# Patient Record
Sex: MALE | Race: WHITE | NOT HISPANIC OR LATINO | Employment: FULL TIME | URBAN - METROPOLITAN AREA
[De-identification: names, ages, dates, MRNs, and addresses within clinical notes are randomized per-mention and may not be internally consistent; named-entity substitution may affect disease eponyms.]

---

## 2017-01-10 ENCOUNTER — HOSPITAL ENCOUNTER (OUTPATIENT)
Dept: RADIOLOGY | Facility: MEDICAL CENTER | Age: 37
Discharge: HOME | End: 2017-01-10
Payer: COMMERCIAL

## 2017-01-10 ENCOUNTER — ALLSCRIPTS OFFICE VISIT (OUTPATIENT)
Dept: OTHER | Facility: OTHER | Age: 37
End: 2017-01-10

## 2017-01-10 ENCOUNTER — TRANSCRIBE ORDERS (OUTPATIENT)
Dept: ADMINISTRATIVE | Facility: HOSPITAL | Age: 37
End: 2017-01-10

## 2017-01-10 DIAGNOSIS — R29.898 OTHER SYMPTOMS AND SIGNS INVOLVING THE MUSCULOSKELETAL SYSTEM: ICD-10-CM

## 2017-01-10 DIAGNOSIS — R79.89 OTHER SPECIFIED ABNORMAL FINDINGS OF BLOOD CHEMISTRY: ICD-10-CM

## 2017-01-10 DIAGNOSIS — M51.16 INTERVERTEBRAL DISC DISORDER WITH RADICULOPATHY OF LUMBAR REGION: ICD-10-CM

## 2017-01-10 DIAGNOSIS — R29.898 OTHER SYMPTOMS REFERABLE TO UPPER ARM JOINT: ICD-10-CM

## 2017-01-10 DIAGNOSIS — M54.9 DORSALGIA: ICD-10-CM

## 2017-01-10 DIAGNOSIS — M54.9 BACKACHE, UNSPECIFIED: Primary | ICD-10-CM

## 2017-01-10 PROCEDURE — 72110 X-RAY EXAM L-2 SPINE 4/>VWS: CPT

## 2017-01-16 ENCOUNTER — HOSPITAL ENCOUNTER (OUTPATIENT)
Dept: MRI IMAGING | Facility: HOSPITAL | Age: 37
Discharge: HOME | End: 2017-01-16
Payer: COMMERCIAL

## 2017-01-16 DIAGNOSIS — R29.898 OTHER SYMPTOMS AND SIGNS INVOLVING THE MUSCULOSKELETAL SYSTEM: ICD-10-CM

## 2017-01-16 DIAGNOSIS — M54.9 DORSALGIA: ICD-10-CM

## 2017-01-16 PROCEDURE — 72148 MRI LUMBAR SPINE W/O DYE: CPT

## 2017-01-20 ENCOUNTER — HOSPITAL ENCOUNTER (OUTPATIENT)
Dept: RADIOLOGY | Age: 37
Discharge: HOME | End: 2017-01-20
Payer: COMMERCIAL

## 2017-01-20 DIAGNOSIS — R79.89 OTHER SPECIFIED ABNORMAL FINDINGS OF BLOOD CHEMISTRY: ICD-10-CM

## 2017-01-20 PROCEDURE — 76705 ECHO EXAM OF ABDOMEN: CPT

## 2017-01-27 ENCOUNTER — GENERIC CONVERSION - ENCOUNTER (OUTPATIENT)
Dept: OTHER | Facility: OTHER | Age: 37
End: 2017-01-27

## 2017-02-06 ENCOUNTER — ALLSCRIPTS OFFICE VISIT (OUTPATIENT)
Dept: OTHER | Facility: OTHER | Age: 37
End: 2017-02-06

## 2017-02-21 ENCOUNTER — GENERIC CONVERSION - ENCOUNTER (OUTPATIENT)
Dept: OTHER | Facility: OTHER | Age: 37
End: 2017-02-21

## 2017-06-30 DIAGNOSIS — M54.9 DORSALGIA: ICD-10-CM

## 2017-06-30 DIAGNOSIS — M51.16 INTERVERTEBRAL DISC DISORDER WITH RADICULOPATHY OF LUMBAR REGION: ICD-10-CM

## 2018-01-10 NOTE — MISCELLANEOUS
Signatures   Electronically signed by :  Jazz Talavera, ; Feb 21 2017  8:17AM EST                       (Author)

## 2018-01-12 NOTE — MISCELLANEOUS
Message  left message that he has mild fatty liver dx  awaiting hept studies   rec rto 1 month      Signatures   Electronically signed by : Kati Farah DO; Jan 27 2017  9:59AM EST                       (Author)

## 2018-01-13 VITALS
DIASTOLIC BLOOD PRESSURE: 100 MMHG | WEIGHT: 274.2 LBS | BODY MASS INDEX: 33.39 KG/M2 | HEIGHT: 76 IN | SYSTOLIC BLOOD PRESSURE: 150 MMHG | TEMPERATURE: 97.5 F

## 2018-01-17 NOTE — RESULT NOTES
Verified Results  * XR CHEST PA & LATERAL [id number] 01:26PM Rian Mckeon Order Number: [id number]     Test Name Result Flag Reference   XR CHEST PA & LATERAL (Report)     CHEST - DUAL ENERGY     INDICATION: Productive cough and chest pain  COMPARISON: None available     VIEWS: PA (including soft tissue/bone algorithms) and lateral projections; 5 images     FINDINGS: Lungs appear adequately aerated  No consolidation or effusion  Cardiac size normal  No vascular congestion or peribronchial thickening  Visualized osseous structures appear within normal limits for the patient's age  No pneumothorax or free air  IMPRESSION:   No active pulmonary disease  Workstation performed: [id number]     Signed by:    Ora Conde DO   11/25/16

## 2018-01-18 NOTE — CONSULTS
Assessment    1  Degenerative lumbar disc (722 52) (M51 36)   2  Lumbar disc herniation with radiculopathy (722 10) (M51 16)    Plan  Lumbar disc herniation with radiculopathy    · *1 - [facility] Physical Therapy  Consult: please assess for lumbar ROM,  core strengthening, hip girdle/LE strengthening  Status: Active  Requested for:  91FMY7787   Ordered; For: Lumbar disc herniation with radiculopathy; Ordered By: Hyacinth Sanderson Performed:  Due: 01FTL1613  Care Summary provided  : Yes   · 1 - Cal Valdez DO Pain Management Physician Referral  Consult Only: the expectation  is that the referring provider will communicate back to the patient on treatment options  Evaluation and Treatment: the expectation is that the referred to provider will  communicate back to the patient on treatment options  please assess for left L4/5 injection  Status: Hold For - Scheduling  Requested for:  69ZVJ5185   Ordered; For: Lumbar disc herniation with radiculopathy; Ordered By: Hyacinth Sanderson Performed:  Due: 65PBL3223; Last Updated By: Araceli Adames; 2/6/2017 9:21:20 AM  Care Summary provided  : Yes   · Follow-up visit in 3 months Evaluation and Treatment  Follow-up  Status: Hold For -  Scheduling  Requested for: 26ZCL2602   Ordered; For: Lumbar disc herniation with radiculopathy; Ordered By: Hyacinth Sanderson Performed:  Due: 89UND5448    Discussion/Summary    Mr Adriana Mora is being referred by Dr Giulia Addison  He presents with acute on chronic LBP and lumbar Radiculopathy  today we reviewed activity restrictions as well as the need for RoM exerises and PT  We discussed the role of conservative medical treatments such as PT, FIORDALIZA and medications  i have provided for him a script for PT, a referral to Dr Jasmine Kimball and will see him in fu in 3 months  Should he develop worsening symptoms of pain or weakness, or new bowel or bladder issues, he is to present to the nearest ED and contact my office ASAP     The patient has the current Goals: Reduce pain, improve function  The patent has the current Barriers: None  Patient is able to Self-Care  Self Referrals: No      Chief Complaint    1  Back Pain  Chronic low grade LBP x 6 yrs following fall, acute exacerbation with unremitting left buttock/medial thigh pain x 2 months after heavy lifting  Reduced pain with activity modification and nsaids  denies weakness, bowel or bladder dysfunction  No recent PT or FIORDALIZA  History of Present Illness    Lila Mistry presents with complaints of gradual onset of constant episodes of mild bilateral lower back pain, radiating to the left buttock and left thigh  On a scale of 1 to 10, the patient rates the pain as 4  The symptoms resulted from a lifting motion  Episodes started about 7 years ago  Symptoms are made worse by sitting, prolonged sitting, lifting and twisting  Symptoms are improving  Associated symptoms include spasm, but no stiffness, no fever, no night sweats, no abdominal pain, no general malaise, no weight loss, no arm numbness, no leg numbness, no arm weakness, no leg weakness, no urinary incontinence, no fecal incontinence, no urinary retention and no rash localized to the area of pain  Review of Systems    Constitutional: no fever and no chills  Eyes: no eyesight problems  ENT: no hearing loss  Cardiovascular: no chest pain and no palpitations  Respiratory: no shortness of breath, no cough and no wheezing  Gastrointestinal: no nausea  Genitourinary: no incontinence  Musculoskeletal: limb pain and joint stiffness, but as noted in HPI, no joint swelling, no myalgias and no limb swelling    The patient presents with complaints of gradual onset of mild lower back arthralgias  Neurological: No compliants of headache, no confusion, no convulsions, no numbness or tingling, no dizziness or fainting, no limb weakness, no difficulty walking  Psychiatric: anxiety, but no depression  Endocrine: no muscle weakness  Hematologic/Lymphatic: no tendency for easy bleeding and no tendency for easy bruising  ROS reviewed  Active Problems    1  Abnormal LFTs (790 6) (R79 89)   2  Abnormal MRI, lumbar spine (793 7) (R93 7)   3  Acute back pain (724 5) (M54 9)   4  Acute URI (465 9) (J06 9)   5  Hypertension, essential (401 9) (I10)   6  Overweight (278 02) (E66 3)   7  Vitamin D deficiency (268 9) (E55 9)   8  Weakness of both lower extremities (729 89) (R29 898)    Past Medical History    1  History of Abdominal discomfort (789 00) (R10 9)   2  History of Acute contact otitis externa of right ear (380 22) (H60 531)   3  History of Acute maxillary sinusitis (461 0) (J01 00)   4  History of Acute viral conjunctivitis of both eyes (077 99) (B30 9)   5  History of acute bronchitis (V12 69) (Z87 09)   6  History of acute bronchitis with bronchospasm (V12 69) (Z87 09)   7  History of acute sinusitis (V12 69) (Z87 09)   8  History of bronchitis (V12 69) (Z87 09)   9  History of Muscle ache (729 1) (M79 1)    The active problems and past medical history were reviewed and updated today  Surgical History    1  History of Tonsillectomy    The surgical history was reviewed and updated today  Family History  Mother    1  Family history of hypertension (V17 49) (Z82 49)   2  Family history of ulcerative colitis (V18 59) (Z83 79)  Father    3  Family history of hypertension (V17 49) (Z82 49)  Paternal Grandmother    3  Family history of cardiac disorder (V17 49) (Z82 49)   5  Family history of hypertension (V17 49) (Z82 49)   6  Family history of renal failure (V18 69) (Z84 1)  Maternal Grandfather    7  Family history of cardiac disorder (V17 49) (Z82 49)  Paternal Grandfather    8  Family history of heart failure (V17 49) (Z82 49)    The family history was reviewed and updated today         Social History    · Alcohol use   · Caffeine use (V49 89) (F15 90)   · Completed high school   · Current every day smoker (305 1) (F17 200)   · No drug use   · Occupation   · Single  The social history was reviewed and updated today  Current Meds   1  AmLODIPine Besylate 5 MG Oral Tablet; TAKE 1 TABLET DAILY FOR BLOOD   PRESSURE; Therapy: 78ZHJ2418 to (Evaluate:11Mar2017)  Requested for: 69IIV1856; Last   Rx:10Jan2017 Ordered   2  Multi-Day Vitamins TABS; Therapy: (Recorded:09Jun2014) to Recorded   3  Nabumetone 750 MG Oral Tablet; TAKE 1 TABLET TWICE DAILY AFTER MEALS; Therapy: 89YQD0591 to (Evaluate:11Mar2017)  Requested for: 73URG8475; Last   Rx:10Jan2017 Ordered   4  Vitamin D (Ergocalciferol) 97680 UNIT Oral Capsule; TAKE 1 CAPSULE WEEKLY; Therapy: 08FYH7250 to (Evaluate:13Apr2017)  Requested for: 96TSY9096; Last   Rx:13Jan2017 Ordered    The medication list was reviewed and updated today  Allergies    1  Ceclor    Vitals  Vital Signs    Recorded: 73WRJ1892 08:52AM   Temperature 98 1 F   Heart Rate 95   Respiration 16   Systolic 350   Diastolic 87   Height 6 ft 4 in   Weight 175 lb 2 oz   BMI Calculated 21 32   BSA Calculated 2 09   Pain Scale 4     Physical Exam   (postive SLR left side, grossly intact sensation / motor and DTR, mildly antalgic gait, paravertebral spasm)   Constitutional Patient appears healthy and well developed  No signs of acute distress present  Eyes Vision is grossly normal  Discs flat with sharp margins  Respiratory Auscultation of lungs: Clear to auscultation bilaterally  Cardiovascular Auscultation of heart: Rate is regular  Rhythm is regular  No heart murmur appreciated  Carotid pulses: 2+ and equal bilaterally, without bruits  Peripheral vascular exam: Pedal Pulses: 2+ and equal bilaterally  Radial pulses: 2+ and equal bilaterally  Extremities: No edema of the lower limbs bilaterally  Abdomen The abdomen is flat  No abdominal scars  Abdomen is soft, nontender, and nondistended  Anus, perineum, and rectum: Exam deferred  Neurologic - Mental Status: Alert and Oriented x3    Mood and affect: Affect is normal   Memory is grossly intact  Attention is WNL  Speech is articulated and fluent  Knowledge and vocabulary consistent with education  Cranial Nerve Exam:  2nd cranial nerve: Visual field was full to confrontation  3rd, 4th, and 6th cranial nerves: Normal with no deficit  5th cranial nerve: Sensation was intact in all three divisions to light touch and temperature  Motor function was intact  7th cranial nerve: Face symmetrical at grimace and at rest  8th cranial nerve: Grossly intact to finger rub bilaterally  9th and 10th cranial nerves: Uvula is midline  11th cranial nerve: Shoulder shrug equal bilaterally  12th cranial nerve: Tongue mideline, no atrophy present  Motor System General Motor Strength: No pronator drift and no parietal drift  Motor System - Upper Extremities: Normal to inspection and palpation  Strength: Deltoids 5/5 bilaterally  Biceps 5/5 bilaterally  Triceps 5/5 bilaterally  Extensor carpi radials is 5/5 bilaterally  Extensor digitorum 5/5 bilaterally  Intrinsic 5/5 bilaterally   5/5 bilaterally  Muscle tone: Normal bilaterally  Muscle Bulk: Normal bilaterally  Motor System - Lower Extremities: Normal to inspection and palpation  Strength: Iliopsoas 5/5 bilaterally  Quadriceps 5/5 bilaterally  Hamstrings 5/5 bilaterally  Gastrocnemius 5/5 bilaterally  Muscle tone: Normal bilaterally  Reflexes: Biceps reflexes are 2+ bilaterally  Triceps reflexes are 2+ bilaterally  Achilles reflexes are 2+ bilaterally  Babinski's reflex is 2+ down going bilaterally  Ankle clonus is absent bilaterally  Coordination: Finger to nose was normal    Sensory: Sensation grossly intact to light touch  Sensation grossly intact to light touch  Gait and Station: Griffith with a normal gait  Results/Data    I personally reviewed the MRI in detail with the patient      MRI Review massive left paracentral L4/5 disc herniation/extrusion with involvement of multiple nerve roots, moderate L5/S1 degeneration        Signatures   Electronically signed by : ZHAO Santiago ; Feb 6 2017  9:23AM EST                       (Author)

## 2018-01-22 VITALS
TEMPERATURE: 98.1 F | DIASTOLIC BLOOD PRESSURE: 87 MMHG | BODY MASS INDEX: 21.33 KG/M2 | SYSTOLIC BLOOD PRESSURE: 157 MMHG | RESPIRATION RATE: 16 BRPM | WEIGHT: 175.13 LBS | HEART RATE: 95 BPM | HEIGHT: 76 IN

## 2018-02-12 ENCOUNTER — OFFICE VISIT (OUTPATIENT)
Dept: FAMILY MEDICINE CLINIC | Facility: CLINIC | Age: 38
End: 2018-02-12
Payer: COMMERCIAL

## 2018-02-12 VITALS
BODY MASS INDEX: 34.58 KG/M2 | RESPIRATION RATE: 16 BRPM | TEMPERATURE: 98.6 F | HEIGHT: 76 IN | WEIGHT: 284 LBS | OXYGEN SATURATION: 98 % | DIASTOLIC BLOOD PRESSURE: 70 MMHG | SYSTOLIC BLOOD PRESSURE: 128 MMHG | HEART RATE: 80 BPM

## 2018-02-12 DIAGNOSIS — I10 ESSENTIAL HYPERTENSION: ICD-10-CM

## 2018-02-12 DIAGNOSIS — J40 BRONCHITIS: Primary | ICD-10-CM

## 2018-02-12 PROBLEM — K76.0 FATTY LIVER: Status: ACTIVE | Noted: 2018-02-12

## 2018-02-12 PROBLEM — R79.89 ABNORMAL LFTS: Status: ACTIVE | Noted: 2017-01-13

## 2018-02-12 PROBLEM — E55.9 VITAMIN D DEFICIENCY: Status: ACTIVE | Noted: 2017-01-13

## 2018-02-12 PROBLEM — M54.9 ACUTE BACK PAIN: Status: ACTIVE | Noted: 2017-01-10

## 2018-02-12 PROCEDURE — 99213 OFFICE O/P EST LOW 20 MIN: CPT | Performed by: INTERNAL MEDICINE

## 2018-02-12 RX ORDER — ERGOCALCIFEROL 1.25 MG/1
1 CAPSULE ORAL WEEKLY
COMMUNITY
Start: 2017-01-13 | End: 2019-01-02 | Stop reason: ALTCHOICE

## 2018-02-12 RX ORDER — AMLODIPINE BESYLATE 5 MG/1
TABLET ORAL
COMMUNITY
Start: 2017-01-10 | End: 2018-06-30 | Stop reason: SDUPTHER

## 2018-02-12 RX ORDER — MULTIVITAMIN
TABLET ORAL
COMMUNITY

## 2018-02-12 RX ORDER — LEVOFLOXACIN 500 MG/1
500 TABLET, FILM COATED ORAL EVERY 24 HOURS
Qty: 10 TABLET | Refills: 0 | Status: SHIPPED | OUTPATIENT
Start: 2018-02-12 | End: 2018-02-22

## 2018-02-12 RX ORDER — NABUMETONE 750 MG/1
1 TABLET, FILM COATED ORAL 2 TIMES DAILY
COMMUNITY
Start: 2017-01-10 | End: 2018-02-12

## 2018-02-12 NOTE — LETTER
February 12, 2018     Patient: Sahara Davis   YOB: 1980   Date of Visit: 2/12/2018       To Whom it May Concern:    Sahara Davis is under my professional care  He was seen in my office on 2/12/2018  He may return to work on 2/14/18  If you have any questions or concerns, please don't hesitate to call           Sincerely,          Navarrete Loges, DO        CC: No Recipients

## 2018-02-12 NOTE — PROGRESS NOTES
Assessment/Plan:         Diagnoses and all orders for this visit:    Bronchitis  -     levofloxacin (LEVAQUIN) 500 mg tablet; Take 1 tablet (500 mg total) by mouth every 24 hours for 10 days    Essential hypertension    Other orders  -     amLODIPine (NORVASC) 5 mg tablet; Take by mouth  -     Multiple Vitamin (MULTI-DAY VITAMINS) TABS; Take by mouth  -     Discontinue: nabumetone (RELAFEN) 750 mg tablet; Take 1 tablet by mouth Twice daily  -     ergocalciferol (VITAMIN D2) 50,000 units; Take 1 capsule by mouth once a week          Subjective:      Patient ID: Miki Crane is a 30 y o  male  Pt complains of being ill x 1 week  He went to pt [facility]   He was given z-malina  He feels no relief  +dry cough  +sore throat  Denies f/s/c        The following portions of the patient's history were reviewed and updated as appropriate: He  has no past medical history on file  He  does not have any pertinent problems on file  He  has a past surgical history that includes Tonsillectomy  His family history includes Hypertension in his mother; No Known Problems in his father  He  reports that he has been smoking  He has never used smokeless tobacco  He reports that he drinks alcohol  He reports that he does not use drugs  Current Outpatient Prescriptions   Medication Sig Dispense Refill    amLODIPine (NORVASC) 5 mg tablet Take by mouth      ergocalciferol (VITAMIN D2) 50,000 units Take 1 capsule by mouth once a week      Multiple Vitamin (MULTI-DAY VITAMINS) TABS Take by mouth      levofloxacin (LEVAQUIN) 500 mg tablet Take 1 tablet (500 mg total) by mouth every 24 hours for 10 days 10 tablet 0     No current facility-administered medications for this visit  No current outpatient prescriptions on file prior to visit  No current facility-administered medications on file prior to visit  He is allergic to cefaclor       Review of Systems   Constitutional: Negative for chills and fever     HENT: Positive for postnasal drip, rhinorrhea, sore throat and voice change  Respiratory: Positive for cough  Negative for shortness of breath and wheezing          +productive cough  Cardiovascular: Negative  Musculoskeletal: Positive for myalgias  Objective:    Vitals:    02/12/18 1135   BP: 128/70   Pulse: 80   Resp: 16   Temp: 98 6 °F (37 °C)   SpO2: 98%        Physical Exam   Constitutional: He appears well-developed and well-nourished  HENT:   Head: Normocephalic and atraumatic  Right Ear: External ear normal    Left Ear: External ear normal    Neck: Normal range of motion  Neck supple  Cardiovascular: Normal rate and regular rhythm  Pulmonary/Chest: Effort normal and breath sounds normal    Abdominal: Soft   Bowel sounds are normal

## 2018-03-27 ENCOUNTER — APPOINTMENT (EMERGENCY)
Dept: RADIOLOGY | Facility: HOSPITAL | Age: 38
End: 2018-03-27
Payer: COMMERCIAL

## 2018-03-27 ENCOUNTER — HOSPITAL ENCOUNTER (EMERGENCY)
Facility: HOSPITAL | Age: 38
Discharge: HOME | End: 2018-03-28
Attending: EMERGENCY MEDICINE | Admitting: EMERGENCY MEDICINE
Payer: COMMERCIAL

## 2018-03-27 DIAGNOSIS — R07.9 CHEST PAIN: Primary | ICD-10-CM

## 2018-03-27 DIAGNOSIS — I10 HYPERTENSION: ICD-10-CM

## 2018-03-27 DIAGNOSIS — R74.8 ELEVATED LIVER ENZYMES: ICD-10-CM

## 2018-03-27 LAB
ALBUMIN SERPL BCP-MCNC: 4 G/DL (ref 3.5–5)
ALP SERPL-CCNC: 70 U/L (ref 46–116)
ALT SERPL W P-5'-P-CCNC: 211 U/L (ref 12–78)
ANION GAP SERPL CALCULATED.3IONS-SCNC: 12 MMOL/L (ref 4–13)
AST SERPL W P-5'-P-CCNC: 101 U/L (ref 5–45)
BASOPHILS # BLD AUTO: 0.08 THOUSANDS/ΜL (ref 0–0.1)
BASOPHILS NFR BLD AUTO: 1 % (ref 0–1)
BILIRUB SERPL-MCNC: 0.4 MG/DL (ref 0.2–1)
BUN SERPL-MCNC: 13 MG/DL (ref 5–25)
CALCIUM SERPL-MCNC: 9.5 MG/DL (ref 8.3–10.1)
CHLORIDE SERPL-SCNC: 102 MMOL/L (ref 100–108)
CO2 SERPL-SCNC: 25 MMOL/L (ref 21–32)
CREAT SERPL-MCNC: 1.13 MG/DL (ref 0.6–1.3)
EOSINOPHIL # BLD AUTO: 0.22 THOUSAND/ΜL (ref 0–0.61)
EOSINOPHIL NFR BLD AUTO: 2 % (ref 0–6)
ERYTHROCYTE [DISTWIDTH] IN BLOOD BY AUTOMATED COUNT: 13.8 % (ref 11.6–15.1)
GFR SERPL CREATININE-BSD FRML MDRD: 83 ML/MIN/1.73SQ M
GLUCOSE SERPL-MCNC: 132 MG/DL (ref 65–140)
HCT VFR BLD AUTO: 48.1 % (ref 36.5–49.3)
HGB BLD-MCNC: 16.4 G/DL (ref 12–17)
LYMPHOCYTES # BLD AUTO: 2.51 THOUSANDS/ΜL (ref 0.6–4.47)
LYMPHOCYTES NFR BLD AUTO: 27 % (ref 14–44)
MCH RBC QN AUTO: 30.8 PG (ref 26.8–34.3)
MCHC RBC AUTO-ENTMCNC: 34.1 G/DL (ref 31.4–37.4)
MCV RBC AUTO: 90 FL (ref 82–98)
MONOCYTES # BLD AUTO: 0.61 THOUSAND/ΜL (ref 0.17–1.22)
MONOCYTES NFR BLD AUTO: 7 % (ref 4–12)
NEUTROPHILS # BLD AUTO: 5.93 THOUSANDS/ΜL (ref 1.85–7.62)
NEUTS SEG NFR BLD AUTO: 63 % (ref 43–75)
PLATELET # BLD AUTO: 169 THOUSANDS/UL (ref 149–390)
PMV BLD AUTO: 10.9 FL (ref 8.9–12.7)
POTASSIUM SERPL-SCNC: 3.9 MMOL/L (ref 3.5–5.3)
PROT SERPL-MCNC: 7.7 G/DL (ref 6.4–8.2)
RBC # BLD AUTO: 5.33 MILLION/UL (ref 3.88–5.62)
SODIUM SERPL-SCNC: 139 MMOL/L (ref 136–145)
TROPONIN I SERPL-MCNC: <0.02 NG/ML
WBC # BLD AUTO: 9.35 THOUSAND/UL (ref 4.31–10.16)

## 2018-03-27 PROCEDURE — 36415 COLL VENOUS BLD VENIPUNCTURE: CPT | Performed by: EMERGENCY MEDICINE

## 2018-03-27 PROCEDURE — 80053 COMPREHEN METABOLIC PANEL: CPT | Performed by: EMERGENCY MEDICINE

## 2018-03-27 PROCEDURE — 85025 COMPLETE CBC W/AUTO DIFF WBC: CPT | Performed by: EMERGENCY MEDICINE

## 2018-03-27 PROCEDURE — 71046 X-RAY EXAM CHEST 2 VIEWS: CPT

## 2018-03-27 PROCEDURE — 84484 ASSAY OF TROPONIN QUANT: CPT | Performed by: EMERGENCY MEDICINE

## 2018-03-27 PROCEDURE — 96374 THER/PROPH/DIAG INJ IV PUSH: CPT

## 2018-03-27 PROCEDURE — 93005 ELECTROCARDIOGRAM TRACING: CPT

## 2018-03-27 RX ORDER — ASPIRIN 325 MG
325 TABLET ORAL ONCE
Status: COMPLETED | OUTPATIENT
Start: 2018-03-27 | End: 2018-03-27

## 2018-03-27 RX ORDER — METOPROLOL TARTRATE 5 MG/5ML
5 INJECTION INTRAVENOUS ONCE
Status: COMPLETED | OUTPATIENT
Start: 2018-03-27 | End: 2018-03-27

## 2018-03-27 RX ADMIN — METOROPROLOL TARTRATE 5 MG: 5 INJECTION, SOLUTION INTRAVENOUS at 22:19

## 2018-03-27 RX ADMIN — ASPIRIN 325 MG: 325 TABLET ORAL at 22:18

## 2018-03-28 ENCOUNTER — DOCUMENTATION (OUTPATIENT)
Dept: FAMILY MEDICINE CLINIC | Facility: CLINIC | Age: 38
End: 2018-03-28

## 2018-03-28 ENCOUNTER — OFFICE VISIT (OUTPATIENT)
Dept: FAMILY MEDICINE CLINIC | Facility: CLINIC | Age: 38
End: 2018-03-28
Payer: COMMERCIAL

## 2018-03-28 VITALS
SYSTOLIC BLOOD PRESSURE: 127 MMHG | DIASTOLIC BLOOD PRESSURE: 83 MMHG | TEMPERATURE: 97.9 F | WEIGHT: 287.1 LBS | BODY MASS INDEX: 34.95 KG/M2 | HEART RATE: 68 BPM | OXYGEN SATURATION: 95 % | RESPIRATION RATE: 18 BRPM

## 2018-03-28 VITALS
BODY MASS INDEX: 34.78 KG/M2 | OXYGEN SATURATION: 98 % | HEART RATE: 94 BPM | WEIGHT: 285.6 LBS | TEMPERATURE: 99 F | HEIGHT: 76 IN | SYSTOLIC BLOOD PRESSURE: 156 MMHG | DIASTOLIC BLOOD PRESSURE: 102 MMHG | RESPIRATION RATE: 16 BRPM

## 2018-03-28 DIAGNOSIS — K76.0 FATTY LIVER: ICD-10-CM

## 2018-03-28 DIAGNOSIS — R79.89 ABNORMAL LFTS: Primary | ICD-10-CM

## 2018-03-28 DIAGNOSIS — Z82.49 FAMILY HISTORY OF EARLY CAD: ICD-10-CM

## 2018-03-28 DIAGNOSIS — I10 ESSENTIAL HYPERTENSION: ICD-10-CM

## 2018-03-28 DIAGNOSIS — Z72.0 TOBACCO USE: ICD-10-CM

## 2018-03-28 LAB — TROPONIN I SERPL-MCNC: <0.02 NG/ML

## 2018-03-28 PROCEDURE — 99214 OFFICE O/P EST MOD 30 MIN: CPT | Performed by: FAMILY MEDICINE

## 2018-03-28 PROCEDURE — 93005 ELECTROCARDIOGRAM TRACING: CPT

## 2018-03-28 PROCEDURE — 36415 COLL VENOUS BLD VENIPUNCTURE: CPT | Performed by: EMERGENCY MEDICINE

## 2018-03-28 PROCEDURE — 99285 EMERGENCY DEPT VISIT HI MDM: CPT

## 2018-03-28 PROCEDURE — 84484 ASSAY OF TROPONIN QUANT: CPT | Performed by: EMERGENCY MEDICINE

## 2018-03-28 RX ORDER — BENZONATATE 100 MG/1
CAPSULE ORAL
COMMUNITY
Start: 2018-01-07 | End: 2018-04-24 | Stop reason: ALTCHOICE

## 2018-03-28 NOTE — ED PROVIDER NOTES
History  Chief Complaint   Patient presents with    Chest Pain     Pt ambulatory on unit C/O CP, dizziness, and L arm numbness/pain for 1 hr  PT states grandfather had MI at 46 yo       82-year-old male presents to the emergency department for evaluation of chest pain and left arm pain  Patient states that he was driving home this evening around 9:00 p m  when he developed a sharp pain along the left side is his chest   Patient felt a burning sensation in the center of his chest as well as a sharp pain  The pain radiated to the left shoulder region and down the left arm  He had brief tingling of the left arm and hand  Patient states he began to break out in a cold sweat and became very anxious  He pulled his car over because he felt dizzy described as feeling lightheaded  Patient has no cardiac history  He does present hypertensive  He does have a history of hypertension and amlodipine  His grandfather had an MI at the age of 45  No coronary disease in his father or siblings  Patient denies palpitations  Has not noticed any change in exercise tolerance          History provided by:  Patient, relative and medical records   used: No    Chest Pain   Pain location:  L chest, L lateral chest and substernal area  Pain quality: burning, radiating and sharp    Pain radiates to:  L shoulder  Pain radiates to the back: no    Pain severity:  Moderate  Onset quality:  Sudden  Progression:  Partially resolved  Chronicity:  New  Context: at rest    Context: not breathing, not lifting and no movement    Relieved by:  Nothing  Worsened by:  Nothing tried  Ineffective treatments:  None tried  Associated symptoms: anxiety, diaphoresis, dizziness and heartburn    Associated symptoms: no abdominal pain, no altered mental status, no anorexia, no cough, no fatigue, no fever, no nausea, no palpitations, no syncope, not vomiting and no weakness    Risk factors: hypertension and male sex    Risk factors: no aortic disease, no coronary artery disease, no diabetes mellitus and no smoking        Prior to Admission Medications   Prescriptions Last Dose Informant Patient Reported? Taking? Multiple Vitamin (MULTI-DAY VITAMINS) TABS  Self Yes No   Sig: Take by mouth   amLODIPine (NORVASC) 5 mg tablet  Self Yes Yes   Sig: Take by mouth   ergocalciferol (VITAMIN D2) 50,000 units  Self Yes No   Sig: Take 1 capsule by mouth once a week      Facility-Administered Medications: None       Past Medical History:   Diagnosis Date    Hypertension        Past Surgical History:   Procedure Laterality Date    TONSILLECTOMY         Family History   Problem Relation Age of Onset    Hypertension Mother     Thyroid nodules Mother     No Known Problems Father     Heart attack Paternal Grandfather 45    Heart attack Paternal Uncle     Coronary artery disease Paternal Uncle      CABG     I have reviewed and agree with the history as documented  Social History   Substance Use Topics    Smoking status: Current Every Day Smoker    Smokeless tobacco: Never Used    Alcohol use Yes        Review of Systems   Constitutional: Positive for diaphoresis  Negative for fatigue and fever  Respiratory: Negative for cough  Cardiovascular: Positive for chest pain  Negative for palpitations and syncope  Gastrointestinal: Positive for heartburn  Negative for abdominal pain, anorexia, nausea and vomiting  Neurological: Positive for dizziness  Negative for weakness  All other systems reviewed and are negative        Physical Exam  ED Triage Vitals [03/27/18 2154]   Temperature Pulse Respirations Blood Pressure SpO2   97 9 °F (36 6 °C) 89 20 (!) 191/115 97 %      Temp Source Heart Rate Source Patient Position - Orthostatic VS BP Location FiO2 (%)   Oral Monitor Lying Right arm --      Pain Score       3           Orthostatic Vital Signs  Vitals:    03/28/18 0045 03/28/18 0100 03/28/18 0130 03/28/18 0200   BP:  140/79 132/81 127/83   Pulse: 72 70 70 68   Patient Position - Orthostatic VS:  Lying Lying Lying       Physical Exam   Constitutional: He is oriented to person, place, and time  Vital signs are normal  He appears well-developed and well-nourished  HENT:   Head: Normocephalic and atraumatic  Eyes: Conjunctivae and EOM are normal  Pupils are equal, round, and reactive to light  Neck: Normal range of motion  Neck supple  Cardiovascular: Normal rate, regular rhythm, normal heart sounds and intact distal pulses  Pulmonary/Chest: Effort normal and breath sounds normal  No accessory muscle usage  No respiratory distress  He exhibits no tenderness  Abdominal: Soft  Normal appearance and bowel sounds are normal  He exhibits no distension  There is no tenderness  There is no rebound and no guarding  Musculoskeletal: Normal range of motion  He exhibits no edema, tenderness or deformity  Lymphadenopathy:     He has no cervical adenopathy  Neurological: He is alert and oriented to person, place, and time  He has normal strength and normal reflexes  Coordination normal    Skin: Skin is warm, dry and intact  No rash noted  Psychiatric: His behavior is normal  Judgment and thought content normal  His mood appears anxious  Vitals reviewed        ED Medications  Medications   metoprolol (LOPRESSOR) injection 5 mg (5 mg Intravenous Given 3/27/18 2219)   aspirin tablet 325 mg (325 mg Oral Given 3/27/18 2218)       Diagnostic Studies  Results Reviewed     Procedure Component Value Units Date/Time    Troponin I [[id number]]  (Normal) Collected:  03/28/18 0111    Lab Status:  Final result Specimen:  Blood from Arm, Left Updated:  03/28/18 0136     Troponin I <0 02 ng/mL     Narrative:         Siemens Chemistry analyzer 99% cutoff is > 0 04 ng/mL in network labs    o cTnI 99% cutoff is useful only when applied to patients in the clinical setting of myocardial ischemia  o cTnI 99% cutoff should be interpreted in the context of clinical history, ECG findings and possibly cardiac imaging to establish correct diagnosis  o cTnI 99% cutoff may be suggestive but clearly not indicative of a coronary event without the clinical setting of myocardial ischemia  Comprehensive metabolic panel [[id number]]  (Abnormal) Collected:  03/27/18 2209    Lab Status:  Final result Specimen:  Blood from Arm, Left Updated:  03/27/18 2239     Sodium 139 mmol/L      Potassium 3 9 mmol/L      Chloride 102 mmol/L      CO2 25 mmol/L      Anion Gap 12 mmol/L      BUN 13 mg/dL      Creatinine 1 13 mg/dL      Glucose 132 mg/dL      Calcium 9 5 mg/dL       (H) U/L       (H) U/L      Alkaline Phosphatase 70 U/L      Total Protein 7 7 g/dL      Albumin 4 0 g/dL      Total Bilirubin 0 40 mg/dL      eGFR 83 ml/min/1 73sq m     Narrative:         National Kidney Disease Education Program recommendations are as follows:  GFR calculation is accurate only with a steady state creatinine  Chronic Kidney disease less than 60 ml/min/1 73 sq  meters  Kidney failure less than 15 ml/min/1 73 sq  meters  Troponin I [[id number]]  (Normal) Collected:  03/27/18 2209    Lab Status:  Final result Specimen:  Blood from Arm, Left Updated:  03/27/18 2237     Troponin I <0 02 ng/mL     Narrative:         Siemens Chemistry analyzer 99% cutoff is > 0 04 ng/mL in network labs    o cTnI 99% cutoff is useful only when applied to patients in the clinical setting of myocardial ischemia  o cTnI 99% cutoff should be interpreted in the context of clinical history, ECG findings and possibly cardiac imaging to establish correct diagnosis  o cTnI 99% cutoff may be suggestive but clearly not indicative of a coronary event without the clinical setting of myocardial ischemia      CBC and differential [[id number]]  (Normal) Collected:  03/27/18 2209    Lab Status:  Final result Specimen:  Blood from Arm, Left Updated:  03/27/18 2214     WBC 9 35 Thousand/uL      RBC 5 33 Million/uL      Hemoglobin 16 4 g/dL Hematocrit 48 1 %      MCV 90 fL      MCH 30 8 pg      MCHC 34 1 g/dL      RDW 13 8 %      MPV 10 9 fL      Platelets 093 Thousands/uL      Neutrophils Relative 63 %      Lymphocytes Relative 27 %      Monocytes Relative 7 %      Eosinophils Relative 2 %      Basophils Relative 1 %      Neutrophils Absolute 5 93 Thousands/µL      Lymphocytes Absolute 2 51 Thousands/µL      Monocytes Absolute 0 61 Thousand/µL      Eosinophils Absolute 0 22 Thousand/µL      Basophils Absolute 0 08 Thousands/µL                  X-ray chest 2 views   Final Result by Guilherme Paiz MD (03/27 2249)      No acute cardiopulmonary disease  Workstation performed: [id number]                    Procedures  ECG 12 Lead Documentation  Date/Time: 3/28/2018 12:05 AM  Performed by: Lisbet Kruse  Authorized by: CONNIE HOLMAN     Indications / Diagnosis:  Chest pain  ECG reviewed by me, the ED Provider: yes    Patient location:  ED  Previous ECG:     Previous ECG:  Unavailable  Interpretation:     Interpretation: normal    Rate:     ECG rate:  75    ECG rate assessment: normal    Rhythm:     Rhythm: sinus rhythm    Ectopy:     Ectopy: none    QRS:     QRS axis:  Normal  Conduction:     Conduction: normal    ST segments:     ST segments:  Normal  T waves:     T waves: normal             Phone Contacts  ED Phone Contact    ED Course  ED Course as of Mar 28 1752   Tue Mar 27, 2018   2323 Patient feeling much better he is no longer feeling left-sided shoulder and arm pain and he states that his dizziness has resolved  He does appear to be much less anxious  Reviewed available blood work results x-ray findings an EKG findings  Will check 2nd troponin at 1:00 a m  and re-evaluate            HEART Risk Score    Flowsheet Row Most Recent Value   History  1 Filed at: 03/27/2018 2214   ECG  0 Filed at: 03/27/2018 2214   Age  0 Filed at: 03/27/2018 2214   Risk Factors  1 Filed at: 03/27/2018 2214   Troponin  0 Filed at: 03/27/2018 2214   Heart Score Risk Calculator   History  1 Filed at: 03/27/2018 2214   ECG  0 Filed at: 03/27/2018 2214   Age  0 Filed at: 03/27/2018 2214   Risk Factors  1 Filed at: 03/27/2018 2214   Troponin  0 Filed at: 03/27/2018 2214   HEART Score  2 Filed at: 03/27/2018 2214   HEART Score  2 Filed at: 03/27/2018 2214                            MDM  Number of Diagnoses or Management Options  Chest pain: new and requires workup  Elevated liver enzymes: established and worsening  Hypertension: new and requires workup     Amount and/or Complexity of Data Reviewed  Clinical lab tests: ordered and reviewed  Tests in the radiology section of CPT®: ordered and reviewed  Tests in the medicine section of CPT®: ordered and reviewed  Decide to obtain previous medical records or to obtain history from someone other than the patient: yes  Discuss the patient with other providers: yes  Independent visualization of images, tracings, or specimens: yes    Patient Progress  Patient progress: stable    CritCare Time    Disposition  Final diagnoses:   Chest pain   Elevated liver enzymes   Hypertension     Time reflects when diagnosis was documented in both MDM as applicable and the Disposition within this note     Time User Action Codes Description Comment    3/28/2018 12:06 AM Jennifer Talbert [R07 9] Chest pain     3/28/2018 12:06 AM Jennifer Talbert Add [R74 8] Elevated liver enzymes     3/28/2018 12:07 AM Jennifer Talbert [I10] Hypertension       ED Disposition     ED Disposition Condition Comment    Discharge  [address] [facility] discharge to home/self care      Condition at discharge: Stable        Follow-up Information     Follow up With Specialties Details Why Contact Susanna Solisolic, DO Internal Medicine, Family Medicine Schedule an appointment as soon as possible for a visit in 2 days For recheck of current symptoms [address]  [phone]          Discharge Medication List as of 3/28/2018 12:08 AM      CONTINUE these medications which have NOT CHANGED    Details   amLODIPine (NORVASC) 5 mg tablet Take by mouth, Starting Tue 1/10/2017, Historical Med      ergocalciferol (VITAMIN D2) 50,000 units Take 1 capsule by mouth once a week, Starting Fri 1/13/2017, Historical Med      Multiple Vitamin (MULTI-DAY VITAMINS) TABS Take by mouth, Historical Med             Outpatient Discharge Orders  Echo stress test w contrast if indicated   Standing Status: Future  Standing Exp   Date: 03/28/22         ED Provider  Electronically Signed by           Supa Oliveira DO  03/28/18 1758

## 2018-03-28 NOTE — ED NOTES
Pt reports arm numbness and chest pain has resolved, Pt resting in Bed, VSS,no signs of acute distress noted, will continue to monitor       Josefina Skinner RN  03/28/18 0001

## 2018-03-28 NOTE — PROGRESS NOTES
Assessment/Plan:         Diagnoses and all orders for this visit:    Abnormal LFTs  -     Hepatitis panel, acute; Future  -     Chronic Hepatitis Panel; Future  -     Lipid Panel with Direct LDL reflex; Future  -     TSH baseline; Future  -     Ambulatory referral to Cardiology; Future    Essential hypertension  Comments:  continue norvasc  Orders:  -     Lipid Panel with Direct LDL reflex; Future  -     TSH baseline; Future  -     Ambulatory referral to Cardiology; Future    Fatty liver  Comments:  check Hep panels  Orders:  -     Lipid Panel with Direct LDL reflex; Future  -     TSH baseline; Future  -     Ambulatory referral to Cardiology; Future    Tobacco use  Comments:  consider chantix  Orders:  -     Lipid Panel with Direct LDL reflex; Future  -     TSH baseline; Future  -     Ambulatory referral to Cardiology; Future    Family history of early CAD  Comments:  check labs, cardiology consult  Orders:  -     Lipid Panel with Direct LDL reflex; Future  -     TSH baseline; Future  -     Ambulatory referral to Cardiology; Future    Other orders  -     benzonatate (TESSALON PERLES) 100 mg capsule;           Subjective:      Patient ID: Jessica Coleman is a 41 y o  male  Here for f/u ED for chest pain last night, did not take his norvasc this am bc he got BP meds in the ED and didn't want to mix them  Has a headache now but CP resolved  No SOB  Strong family h/o early heart disease  Has appt for stress echo tomorrow am  CXR, labs were ok last night, h/o elev LFTs, had US which showed fatty liver, but never did the hepatitis BW            The following portions of the patient's history were reviewed and updated as appropriate: allergies, current medications, past family history, past medical history, past social history, past surgical history and problem list     Review of Systems      Objective:      BP (!) 156/102 (BP Location: Right arm, Patient Position: Sitting, Cuff Size: Standard)   Pulse 94   Temp 99 °F (37 2 °C)   Resp 16   Ht 6' 4" (1 93 m)   Wt 130 kg (285 lb 9 6 oz)   SpO2 98%   BMI 34 76 kg/m²          Physical Exam   Constitutional: He is oriented to person, place, and time  He appears well-developed and well-nourished  Cardiovascular: Normal rate, regular rhythm and normal heart sounds  Pulmonary/Chest: Effort normal and breath sounds normal    Neurological: He is alert and oriented to person, place, and time  Psychiatric: He has a normal mood and affect   His behavior is normal  Judgment and thought content normal

## 2018-03-28 NOTE — ED NOTES
Patient transported to [address] [place] [address] Marylu, RN  03/27/18 1341 Candelario Nicolette

## 2018-03-28 NOTE — DISCHARGE INSTRUCTIONS
Chronic Hypertension, Ambulatory Care   GENERAL INFORMATION:   Chronic hypertension  is a long-term condition in which your blood pressure (BP) is higher than normal  Your BP is the force of your blood moving against the walls of your arteries  Hypertension is a BP of 140/90 or higher  Common symptoms include the following:   · Headache     · Blurred vision    · Chest pain     · Dizziness or weakness     · Trouble breathing     · Nosebleeds  Seek immediate care for the following symptoms:   · Severe headache or vision loss    · Weakness in an arm or leg    · Confusion or difficulty speaking    · Discomfort in your chest that feels like squeezing, pressure, fullness, or pain    · Suddenly feeling lightheaded or trouble breathing    · Pain or discomfort in your back, neck, jaw, stomach, or arm  Treatment for chronic hypertension  may include medicine to lower your BP  You may also need to make lifestyle changes  Take your medicine exactly as directed  Manage chronic hypertension:   · Take your BP at home  Sit and rest for 5 minutes before you take your BP  Extend your arm and support it on a flat surface  Your arm should be at the same level as your heart  Follow the directions that came with your BP monitor  If possible, take at least 2 BP readings each time  Take your BP at least twice a day at the same times each day, such as morning and evening  Keep a log of your BP readings and bring it to your follow-up visits  · Eat less sodium (salt)  Do not add sodium to your food  Limit foods that are high in sodium, such as canned foods, potato chips, and cold cuts  Your healthcare provider may suggest that you follow the [address] [place] [address] Street  The plan is low in sodium, unhealthy fats, and total fat  It is high in potassium, calcium, and fiber  · Exercise regularly  Exercise at least 30 minutes per day, on most days of the week  This will help decrease your BP   Ask your healthcare provider about the best exercise plan for you  · Limit alcohol  Women should limit alcohol to 1 drink a day  Men should limit alcohol to 2 drinks a day  A drink of alcohol is 12 ounces of beer, 5 ounces of wine, or 1½ ounces of liquor  · Do not smoke  If you smoke, it is never too late to quit  Smoking can increase your BP  Smoking also worsens other health conditions you may have that can increase your risk for hypertension  Ask your healthcare provider for information if you need help quitting  Follow up with your healthcare provider as directed: You will need to return to have your BP checked and to have other lab tests done  Write down your questions so you remember to ask them during your visits  CARE AGREEMENT:   You have the right to help plan your care  Learn about your health condition and how it may be treated  Discuss treatment options with your caregivers to decide what care you want to receive  You always have the right to refuse treatment  The above information is an  only  It is not intended as medical advice for individual conditions or treatments  Talk to your doctor, nurse or pharmacist before following any medical regimen to see if it is safe and effective for you  © 2014 [address] is for End User's use only and may not be sold, redistributed or otherwise used for commercial purposes  All illustrations and images included in CareNotes® are the copyrighted property of [employer] D [employer] M [employer]  or Jeremi Akbar  Chest Pain   AMBULATORY CARE:   Chest pain  can be caused by a range of conditions, from not serious to life-threatening  It may be caused by a heart attack or a blood clot in your lungs  Sometimes chest pain or pressure is caused by poor blood flow to your heart (angina)  Infection, inflammation, or a fracture in the bones or cartilage in your chest can cause pain or discomfort   Chest pain can also be a symptom of a digestive problem, such as acid reflux or a stomach ulcer  An anxiety attack or a strong emotion such as anger can also cause chest pain  It is important to follow up with your healthcare provider to find the cause of your chest pain  Common symptoms you may have with chest pain:   · Fever or sweating     · Nausea or vomiting     · Shortness of breath     · Discomfort or pressure that spreads from your chest to your back, jaw, or arm     · A racing or slow heartbeat     · Feeling weak, tired, or faint  Call 911 if:   · You have any of the following signs of a heart attack:      ¨ Squeezing, pressure, or pain in your chest that lasts longer than 5 minutes or returns    ¨ Discomfort or pain in your back, neck, jaw, stomach, or arm     ¨ Trouble breathing    ¨ Nausea or vomiting    ¨ Lightheadedness or a sudden cold sweat, especially with chest pain or trouble breathing    Seek care immediately if:   · You have chest discomfort that gets worse, even with medicine  · You cough or vomit blood  · Your bowel movements are black or bloody  · You cannot stop vomiting, or it hurts to swallow  Contact your healthcare provider if:   · You have questions or concerns about your condition or care  Treatment for chest pain  may include medicine to treat your symptoms while your healthcare provider finds the cause of your chest pain  · Medicines  may be given to treat the cause of your chest pain  Examples include pain medicine, anxiety medicine, or medicines to increase blood flow to your heart  · Do not take certain medicines without asking your healthcare provider first   These include NSAIDs, herbal or vitamin supplements, or hormones (estrogen or progestin)  Follow up with your healthcare provider within 72 hours, or as directed: You may need to return for more tests to find the cause of your chest pain   You may be referred to a specialist, such as a cardiologist or gastroenterologist  Write down your questions so you remember to ask them during your visits  Healthy living tips: The following are general healthy guidelines  If your chest pain is caused by a heart problem, your healthcare provider will give you specific guidelines to follow  · Do not smoke  Nicotine and other chemicals in cigarettes and cigars can cause lung and heart damage  Ask your healthcare provider for information if you currently smoke and need help to quit  E-cigarettes or smokeless tobacco still contain nicotine  Talk to your healthcare provider before you use these products  · Eat a variety of healthy, low-fat foods  Healthy foods include fruits, vegetables, whole-grain breads, low-fat dairy products, beans, lean meats, and fish  Ask for more information about a heart healthy diet  · Ask about activity  Your healthcare provider will tell you which activities to limit or avoid  Ask when you can drive, return to work, and have sex  Ask about the best exercise plan for you  · Maintain a healthy weight  Ask your healthcare provider how much you should weigh  Ask him or her to help you create a weight loss plan if you are overweight  · Get the flu and pneumonia vaccines  All adults should get the influenza (flu) vaccine  Get it every year as soon as it becomes available  The pneumococcal vaccine is given to adults aged 72 years or older  The vaccine is given every 5 years to prevent pneumococcal disease, such as pneumonia  © 2017 [address] Information is for End User's use only and may not be sold, redistributed or otherwise used for commercial purposes  All illustrations and images included in CareNotes® are the copyrighted property of A D [employer] M , Inc  or Jeremi Akbar  The above information is an  only  It is not intended as medical advice for individual conditions or treatments  Talk to your doctor, nurse or pharmacist before following any medical regimen to see if it is safe and effective for you

## 2018-03-28 NOTE — ED NOTES
Pt discharge instructions reviewed, Pt has no further questions at this time  Pt awake and alert, no signs of acute distress noted  Pt ambulated out of ED with a steady gait       Lukas Gordon RN  03/28/18 1923

## 2018-03-29 ENCOUNTER — HOSPITAL ENCOUNTER (OUTPATIENT)
Dept: NON INVASIVE DIAGNOSTICS | Facility: HOSPITAL | Age: 38
Discharge: HOME | End: 2018-03-29
Attending: EMERGENCY MEDICINE
Payer: COMMERCIAL

## 2018-03-29 VITALS
HEART RATE: 74 BPM | SYSTOLIC BLOOD PRESSURE: 148 MMHG | OXYGEN SATURATION: 98 % | DIASTOLIC BLOOD PRESSURE: 98 MMHG | BODY MASS INDEX: 34.93 KG/M2 | WEIGHT: 287 LBS

## 2018-03-29 DIAGNOSIS — R07.9 CHEST PAIN: ICD-10-CM

## 2018-03-29 LAB
CHEST PAIN STATEMENT: NORMAL
MAX DIASTOLIC BP: 110 MMHG
MAX HEART RATE: 166 BPM
MAX PREDICTED HEART RATE: 184 BPM
MAX. SYSTOLIC BP: 220 MMHG
PROTOCOL NAME: NORMAL
REASON FOR TERMINATION: NORMAL
TARGET HR FORMULA: NORMAL
TEST INDICATION: NORMAL
TIME IN EXERCISE PHASE: NORMAL

## 2018-03-29 PROCEDURE — 93351 STRESS TTE COMPLETE: CPT | Performed by: INTERNAL MEDICINE

## 2018-03-29 PROCEDURE — 93350 STRESS TTE ONLY: CPT

## 2018-03-30 LAB
ATRIAL RATE: 69 BPM
ATRIAL RATE: 75 BPM
P AXIS: 38 DEGREES
P AXIS: 39 DEGREES
PR INTERVAL: 180 MS
PR INTERVAL: 206 MS
QRS AXIS: 30 DEGREES
QRS AXIS: 44 DEGREES
QRSD INTERVAL: 102 MS
QRSD INTERVAL: 98 MS
QT INTERVAL: 364 MS
QT INTERVAL: 386 MS
QTC INTERVAL: 406 MS
QTC INTERVAL: 413 MS
T WAVE AXIS: 51 DEGREES
T WAVE AXIS: 59 DEGREES
VENTRICULAR RATE: 69 BPM
VENTRICULAR RATE: 75 BPM

## 2018-03-30 PROCEDURE — 93010 ELECTROCARDIOGRAM REPORT: CPT | Performed by: INTERNAL MEDICINE

## 2018-04-24 ENCOUNTER — OFFICE VISIT (OUTPATIENT)
Dept: CARDIOLOGY CLINIC | Facility: CLINIC | Age: 38
End: 2018-04-24
Payer: COMMERCIAL

## 2018-04-24 VITALS
DIASTOLIC BLOOD PRESSURE: 82 MMHG | BODY MASS INDEX: 35.31 KG/M2 | SYSTOLIC BLOOD PRESSURE: 130 MMHG | WEIGHT: 290 LBS | HEIGHT: 76 IN | HEART RATE: 75 BPM

## 2018-04-24 DIAGNOSIS — K76.0 FATTY LIVER: ICD-10-CM

## 2018-04-24 DIAGNOSIS — I10 ESSENTIAL HYPERTENSION: ICD-10-CM

## 2018-04-24 DIAGNOSIS — R07.2 PRECORDIAL PAIN: Primary | ICD-10-CM

## 2018-04-24 DIAGNOSIS — Z82.49 FAMILY HISTORY OF EARLY CAD: ICD-10-CM

## 2018-04-24 DIAGNOSIS — Z72.0 TOBACCO USE: ICD-10-CM

## 2018-04-24 DIAGNOSIS — R79.89 ABNORMAL LFTS: ICD-10-CM

## 2018-04-24 PROCEDURE — 99244 OFF/OP CNSLTJ NEW/EST MOD 40: CPT | Performed by: INTERNAL MEDICINE

## 2018-04-24 NOTE — PROGRESS NOTES
Consult - Cardiology   Rosita Guerra Redline 41 y o  male MRN: [id number]        Problems    1  Precordial pain     2  Abnormal LFTs  Ambulatory referral to Cardiology   3  Essential hypertension  Ambulatory referral to Cardiology    continue norvasc   4  Fatty liver  Ambulatory referral to Cardiology    check Hep panels   5  Tobacco use  Ambulatory referral to Cardiology    consider chantix   6  Family history of early CAD  Ambulatory referral to Cardiology    check labs, cardiology consult      impression     Anuj Chang is a very pleasant 28-year-old man unfortunately with multiple risk factors which are lifestyle oriented including tobacco abuse, overweight, for diet with a self defined problem with breakfast and dinner where most of his food is take out  He specifically came to see me at the request of Dr Ajay Deal  For recent episode chest pain requiring a visit to the ER, with a follow-up stress echo which was normal       We discussed his multiple risk factors, tobacco cessation is imperative, but waiting on Chantix per his PCP until his blood pressure is more stable  His hypertension is better controlled in my office today       he still needs to have his lipid panel done, but complicating this is moderately elevated liver functions which are thought to be attributable to fatty liver disease, but acute hepatitis panel was ordered      Plan     please have the labs done   the excellent result of your stress echo makes the likelihood of year chest pain being due to premature CAD much lower   significant dietary changes are needed, please stop getting take out for dinner and breakfast   Try to add healthier ingredients such as avocados, eggs,  Oatmeal and cereal    weight loss is necessary   tobacco cessation when able with Chantix as blood pressure improves      Reason for Consult / Principal Problem: chest pain, And family history of premature CAD    HPI: Joel Moser is a 41y o  year old male  With a very strong family history of premature CAD in multiple family members including sudden death in his maternal aunt at a premature age, longstanding tobacco abuse, hypertension, unknown lipid status, but in the last 1 year found to have likely fatty liver disease with moderately elevated liver function tests  Noam Thomson recently had an episode while driving to work of significant chest discomfort, radiating to the left arm associated with dizziness, had to pull over call his dad for a ride to the ER  He was found to be hypertensive, but workup was ultimately unremarkable, and considering his young age in low cardiovascular risk he was referred for an outpatient stress echo which revealed 11 min of exercise, a high function stress test with no symptoms, and normal study  His blood pressure is normal in my office at this time, but recently especially in the setting of some non adherence with his amlodipine it has been uncontrolled  He continues to smoke, but does contemplate quitting  He tells me he has discussed Chantix with  His PCP, and is awaiting a more stable blood pressure before having a prescribed  He as been ordered hepatic function panel based on recently elevated LFTs  He remains overweight, his diet is poor, he says breakfast and dinner are his worst meals weight typically eats out, does takeout food, breakfast wraps, palacios, sausage etcetera  He currently denies any recurrent symptoms  Review of Systems   Constitutional: Negative  HENT: Negative  Eyes: Negative  Respiratory: Negative  Cardiovascular: Negative  Gastrointestinal: Negative  Endocrine: Negative  Genitourinary: Negative  Musculoskeletal: Negative  Skin: Negative  Neurological: Negative  Hematological: Negative  Psychiatric/Behavioral: Negative            Past Medical History:   Diagnosis Date    Hypertension      Past Surgical History:   Procedure Laterality Date    TONSILLECTOMY       History Alcohol Use    Yes     History   Drug Use No     History   Smoking Status    Current Every Day Smoker   Smokeless Tobacco    Never Used     Family History   Problem Relation Age of Onset    Hypertension Mother     Thyroid nodules Mother     No Known Problems Father     Heart attack Paternal Grandfather 45    Heart attack Paternal Uncle     Coronary artery disease Paternal Uncle      CABG    Coronary artery disease Paternal Aunt     Heart attack Paternal Aunt        Allergies: Allergies   Allergen Reactions    Cefaclor        Medications:     Current Outpatient Prescriptions:     amLODIPine (NORVASC) 5 mg tablet, Take by mouth, Disp: , Rfl:     Multiple Vitamin (MULTI-DAY VITAMINS) TABS, Take by mouth, Disp: , Rfl:     ergocalciferol (VITAMIN D2) 50,000 units, Take 1 capsule by mouth once a week, Disp: , Rfl:       Vitals:    04/24/18 0958   BP: 130/82   Pulse: 75     Weight (last 2 days)     Date/Time   Weight    04/24/18 0958  132 (290)            Physical Exam   Constitutional: He is oriented to person, place, and time  He appears well-developed and well-nourished  No distress  HENT:   Head: Normocephalic and atraumatic  Mouth/Throat: Oropharynx is clear and moist    Eyes: Conjunctivae and EOM are normal  Pupils are equal, round, and reactive to light  No scleral icterus  Neck: Normal range of motion  Neck supple  No JVD present  No thyromegaly present  Cardiovascular: Normal rate, regular rhythm, normal heart sounds and intact distal pulses  Exam reveals no gallop and no friction rub  No murmur heard  Pulmonary/Chest: Effort normal and breath sounds normal  No respiratory distress  He has no wheezes  He has no rales  Abdominal: Soft  Bowel sounds are normal  He exhibits no distension  There is no tenderness  Musculoskeletal: Normal range of motion  He exhibits no edema or deformity  Neurological: He is alert and oriented to person, place, and time  No cranial nerve deficit  Skin: Skin is warm and dry  No rash noted  He is not diaphoretic  No erythema  Psychiatric: He has a normal mood and affect  Laboratory Studies:  CMP:  Lab Results   Component Value Date    CREATININE 1 13 03/27/2018    BUN 13 03/27/2018     03/27/2018    K 3 9 03/27/2018     03/27/2018    CO2 25 03/27/2018    GLUCOSE 132 03/27/2018    PROT 7 7 03/27/2018    ALKPHOS 70 03/27/2018     (H) 03/27/2018     (H) 03/27/2018     NT-proBNP: No results found for: NTBNP   CBC:  Lab Results   Component Value Date    WBC 9 35 03/27/2018    RBC 5 33 03/27/2018    HGB 16 4 03/27/2018    HCT 48 1 03/27/2018    MCV 90 03/27/2018    MCH 30 8 03/27/2018    RDW 13 8 03/27/2018     03/27/2018     Coags:    Lipid Profile:   No results found for: CHOL  No results found for: HDL  No results found for: LDLCALC  No results found for: TRIG    Cardiac testing:      stress echo 3/29/2018-11 min of exercise, no symptoms, normal study    Lyle Lopez MD    Portions of the record may have been created with voice recognition software   Occasional wrong word or "sound a like" substitutions may have occurred due to the inherent limitations of voice recognition software   Read the chart carefully and recognize, using context, where substitutions have occurred

## 2018-06-30 DIAGNOSIS — I10 ESSENTIAL HYPERTENSION: Primary | ICD-10-CM

## 2018-07-02 RX ORDER — AMLODIPINE BESYLATE 5 MG/1
TABLET ORAL
Qty: 90 TABLET | Refills: 0 | Status: SHIPPED | OUTPATIENT
Start: 2018-07-02 | End: 2018-09-28 | Stop reason: SDUPTHER

## 2018-08-20 ENCOUNTER — OFFICE VISIT (OUTPATIENT)
Dept: FAMILY MEDICINE CLINIC | Facility: CLINIC | Age: 38
End: 2018-08-20
Payer: COMMERCIAL

## 2018-08-20 VITALS
TEMPERATURE: 97.7 F | DIASTOLIC BLOOD PRESSURE: 82 MMHG | SYSTOLIC BLOOD PRESSURE: 134 MMHG | OXYGEN SATURATION: 97 % | WEIGHT: 294.2 LBS | HEIGHT: 76 IN | BODY MASS INDEX: 35.83 KG/M2 | HEART RATE: 91 BPM

## 2018-08-20 DIAGNOSIS — J39.9 UPPER RESPIRATORY DISEASE: Primary | ICD-10-CM

## 2018-08-20 PROBLEM — M54.9 ACUTE BACK PAIN: Status: RESOLVED | Noted: 2017-01-10 | Resolved: 2018-08-20

## 2018-08-20 PROCEDURE — 3008F BODY MASS INDEX DOCD: CPT | Performed by: PHYSICIAN ASSISTANT

## 2018-08-20 PROCEDURE — 99213 OFFICE O/P EST LOW 20 MIN: CPT | Performed by: PHYSICIAN ASSISTANT

## 2018-08-20 RX ORDER — AZITHROMYCIN 250 MG/1
TABLET, FILM COATED ORAL
Qty: 6 TABLET | Refills: 0 | Status: SHIPPED | OUTPATIENT
Start: 2018-08-20 | End: 2018-08-24

## 2018-08-20 NOTE — PROGRESS NOTES
Assessment/Plan:     Diagnoses and all orders for this visit:    Upper respiratory disease  Comments:   the patient has upper respiratory infection p o  azithromycin ordered patient may continue his DayQuil NyQuil  Patient to rest and increase fluids follow up i  Orders:  -     azithromycin (ZITHROMAX) 250 mg tablet; Take 2 tablets today then 1 tablet daily x 4 days          Subjective:      Patient ID: Augusta Santana is a 41 y o  male  Patient presents with 3 days of postnasal drip headaches sinus pressure cough and congestion  Patient taking over-the-counter DayQuil NyQuil and Flonase with some relief  No fever some ear pressure        The following portions of the patient's history were reviewed and updated as appropriate:   He  has a past medical history of Acute bronchiolitis with bronchospasm and Hypertension  He   Patient Active Problem List    Diagnosis Date Noted    Fatty liver 02/12/2018    Essential hypertension 06/23/2017    Abnormal LFTs 01/13/2017    Vitamin D deficiency 01/13/2017    Overweight 04/14/2015     Current Outpatient Prescriptions   Medication Sig Dispense Refill    amLODIPine (NORVASC) 5 mg tablet TAKE 1 TABLET EVERY DAY AS NEEDED FOR BLOOD PRESSURE 90 tablet 0    azithromycin (ZITHROMAX) 250 mg tablet Take 2 tablets today then 1 tablet daily x 4 days 6 tablet 0    ergocalciferol (VITAMIN D2) 50,000 units Take 1 capsule by mouth once a week      Multiple Vitamin (MULTI-DAY VITAMINS) TABS Take by mouth       No current facility-administered medications for this visit  Current Outpatient Prescriptions on File Prior to Visit   Medication Sig    amLODIPine (NORVASC) 5 mg tablet TAKE 1 TABLET EVERY DAY AS NEEDED FOR BLOOD PRESSURE    ergocalciferol (VITAMIN D2) 50,000 units Take 1 capsule by mouth once a week    Multiple Vitamin (MULTI-DAY VITAMINS) TABS Take by mouth     No current facility-administered medications on file prior to visit        He is allergic to cefaclor       Review of Systems   Constitutional: Negative for activity change, chills, fatigue and fever  HENT: Positive for sinus pain and sinus pressure  Negative for ear pain and sore throat  Eyes: Negative for visual disturbance  Respiratory: Positive for cough  Negative for shortness of breath  Cardiovascular: Negative for chest pain  Gastrointestinal: Negative for nausea  Skin: Negative for rash  Neurological: Positive for headaches  Negative for dizziness  Objective:        Physical Exam   Constitutional: He is oriented to person, place, and time  He appears well-developed and well-nourished  HENT:   Right Ear: Tympanic membrane, external ear and ear canal normal    Left Ear: Tympanic membrane, external ear and ear canal normal    Nose: Right sinus exhibits no frontal sinus tenderness  Left sinus exhibits no maxillary sinus tenderness and no frontal sinus tenderness  Mouth/Throat: Posterior oropharyngeal erythema present  Eyes: Conjunctivae are normal  Pupils are equal, round, and reactive to light  Neck: Neck supple  Carotid bruit is not present  Thyromegaly present  Cardiovascular: Normal rate, regular rhythm and normal heart sounds  No murmur heard  Pulmonary/Chest: Effort normal  He has no wheezes  He has rhonchi in the left upper field  Abdominal: He exhibits mass  Musculoskeletal: He exhibits no edema  Lymphadenopathy:     He has cervical adenopathy  Neurological: He is alert and oriented to person, place, and time  Skin: Skin is warm and dry  No rash noted  Psychiatric: He has a normal mood and affect  His behavior is normal  Judgment and thought content normal    Nursing note and vitals reviewed

## 2018-09-28 DIAGNOSIS — I10 ESSENTIAL HYPERTENSION: ICD-10-CM

## 2018-10-01 RX ORDER — AMLODIPINE BESYLATE 5 MG/1
5 TABLET ORAL DAILY
Qty: 90 TABLET | Refills: 1 | Status: SHIPPED | OUTPATIENT
Start: 2018-10-01 | End: 2019-01-02 | Stop reason: SDUPTHER

## 2018-11-06 ENCOUNTER — OFFICE VISIT (OUTPATIENT)
Dept: FAMILY MEDICINE CLINIC | Facility: CLINIC | Age: 38
End: 2018-11-06
Payer: COMMERCIAL

## 2018-11-06 VITALS
TEMPERATURE: 97.9 F | HEIGHT: 76 IN | BODY MASS INDEX: 35.87 KG/M2 | OXYGEN SATURATION: 98 % | SYSTOLIC BLOOD PRESSURE: 128 MMHG | DIASTOLIC BLOOD PRESSURE: 82 MMHG | HEART RATE: 85 BPM | WEIGHT: 294.6 LBS

## 2018-11-06 DIAGNOSIS — H10.33 ACUTE CONJUNCTIVITIS OF BOTH EYES, UNSPECIFIED ACUTE CONJUNCTIVITIS TYPE: Primary | ICD-10-CM

## 2018-11-06 PROCEDURE — 99213 OFFICE O/P EST LOW 20 MIN: CPT | Performed by: PHYSICIAN ASSISTANT

## 2018-11-06 PROCEDURE — 3008F BODY MASS INDEX DOCD: CPT | Performed by: PHYSICIAN ASSISTANT

## 2018-11-06 RX ORDER — TOBRAMYCIN 3 MG/ML
1 SOLUTION/ DROPS OPHTHALMIC
Qty: 1 BOTTLE | Refills: 0 | Status: SHIPPED | OUTPATIENT
Start: 2018-11-06 | End: 2019-02-26

## 2018-11-06 RX ORDER — AZITHROMYCIN 250 MG/1
TABLET, FILM COATED ORAL
Qty: 6 TABLET | Refills: 0 | Status: SHIPPED | OUTPATIENT
Start: 2018-11-06 | End: 2018-11-10

## 2018-11-06 NOTE — PROGRESS NOTES
Assessment/Plan:     Diagnoses and all orders for this visit:    Acute conjunctivitis of both eyes, unspecified acute conjunctivitis type  Comments: The tobramycin eyedrops ordered  Patient to rest   Patient may take Flonase and Mucinex  Z-Rayshawn given for patient to take for his trip to [facility]  Orders:  -     azithromycin (ZITHROMAX) 250 mg tablet; Take 2 tablets today then 1 tablet daily x 4 days  -     tobramycin (TOBREX) 0 3 % SOLN; Administer 1 drop to both eyes every 4 (four) hours while awake          Subjective:      Patient ID: Charly Fletcher is a 41 y o  male  Patient presents with sinus pain and pressure is since yesterday  Patient states his ears feel blocked  He patient has scratchy throat due to postnasal drip  No fever patient has a slight cough  Patient started his Flonase  Patient has some bilateral red and itchy eyes which started on Saturday  No trouble seeing   like sensation  Patient states he did go visit his 3year-old patient's niece about a week ago  Patient is leaving for [facility] and 3-4 days  The following portions of the patient's history were reviewed and updated as appropriate:   He  has a past medical history of Acute bronchiolitis with bronchospasm and Hypertension    He   Patient Active Problem List    Diagnosis Date Noted    Acute conjunctivitis of both eyes 11/06/2018    Fatty liver 02/12/2018    Essential hypertension 06/23/2017    Abnormal LFTs 01/13/2017    Vitamin D deficiency 01/13/2017    Overweight 04/14/2015     Current Outpatient Prescriptions   Medication Sig Dispense Refill    amLODIPine (NORVASC) 5 mg tablet Take 1 tablet (5 mg total) by mouth daily 90 tablet 1    azithromycin (ZITHROMAX) 250 mg tablet Take 2 tablets today then 1 tablet daily x 4 days 6 tablet 0    ergocalciferol (VITAMIN D2) 50,000 units Take 1 capsule by mouth once a week      Multiple Vitamin (MULTI-DAY VITAMINS) TABS Take by mouth      tobramycin (TOBREX) 0 3 % SOLN Administer 1 drop to both eyes every 4 (four) hours while awake 1 Bottle 0     No current facility-administered medications for this visit  Current Outpatient Prescriptions on File Prior to Visit   Medication Sig    amLODIPine (NORVASC) 5 mg tablet Take 1 tablet (5 mg total) by mouth daily    ergocalciferol (VITAMIN D2) 50,000 units Take 1 capsule by mouth once a week    Multiple Vitamin (MULTI-DAY VITAMINS) TABS Take by mouth     No current facility-administered medications on file prior to visit  He is allergic to cefaclor       Review of Systems   Constitutional: Negative for appetite change, chills, fatigue and fever  HENT: Positive for ear pain, postnasal drip, sinus pain and sinus pressure  Respiratory: Positive for cough  Neurological: Negative for dizziness and headaches  Objective:        Physical Exam   Constitutional: He is oriented to person, place, and time  He appears well-developed and well-nourished  No distress  HENT:   Head: Normocephalic and atraumatic  Right Ear: Tympanic membrane, external ear and ear canal normal    Left Ear: Tympanic membrane, external ear and ear canal normal    Nose: Nose normal  No rhinorrhea  Right sinus exhibits no maxillary sinus tenderness and no frontal sinus tenderness  Left sinus exhibits no maxillary sinus tenderness and no frontal sinus tenderness  Mouth/Throat: Oropharynx is clear and moist  No oropharyngeal exudate or posterior oropharyngeal erythema  Eyes: Pupils are equal, round, and reactive to light  Right eye exhibits no discharge  Left eye exhibits no discharge  Right conjunctiva is injected  Left conjunctiva is injected  Neck: Normal range of motion  Neck supple  Cardiovascular: Normal rate, regular rhythm and normal heart sounds  No murmur heard  Pulmonary/Chest: Effort normal and breath sounds normal  No respiratory distress  He has no wheezes  He has no rales  Musculoskeletal: Normal range of motion  Lymphadenopathy:     He has no cervical adenopathy  Neurological: He is alert and oriented to person, place, and time  Skin: Skin is warm and dry  He is not diaphoretic  Psychiatric: He has a normal mood and affect  His behavior is normal  Judgment and thought content normal    Nursing note and vitals reviewed

## 2018-11-26 ENCOUNTER — OFFICE VISIT (OUTPATIENT)
Dept: URGENT CARE | Age: 38
End: 2018-11-26
Payer: COMMERCIAL

## 2018-11-26 VITALS
OXYGEN SATURATION: 98 % | RESPIRATION RATE: 16 BRPM | TEMPERATURE: 99.4 F | DIASTOLIC BLOOD PRESSURE: 84 MMHG | WEIGHT: 294 LBS | HEIGHT: 76 IN | BODY MASS INDEX: 35.8 KG/M2 | HEART RATE: 86 BPM | SYSTOLIC BLOOD PRESSURE: 138 MMHG

## 2018-11-26 DIAGNOSIS — J06.9 VIRAL URI: Primary | ICD-10-CM

## 2018-11-26 PROCEDURE — 99213 OFFICE O/P EST LOW 20 MIN: CPT | Performed by: PHYSICIAN ASSISTANT

## 2018-11-26 RX ORDER — FLUTICASONE PROPIONATE 50 MCG
1 SPRAY, SUSPENSION (ML) NASAL DAILY
COMMUNITY

## 2018-11-26 RX ORDER — ALBUTEROL SULFATE 90 UG/1
2 AEROSOL, METERED RESPIRATORY (INHALATION) EVERY 6 HOURS PRN
Qty: 1 INHALER | Refills: 0 | Status: SHIPPED | OUTPATIENT
Start: 2018-11-26 | End: 2020-02-13 | Stop reason: SDUPTHER

## 2018-11-26 RX ORDER — BENZONATATE 100 MG/1
100 CAPSULE ORAL 3 TIMES DAILY PRN
Qty: 20 CAPSULE | Refills: 0 | Status: SHIPPED | OUTPATIENT
Start: 2018-11-26 | End: 2019-05-22

## 2018-11-26 NOTE — LETTER
November 26, 2018     Patient: Amelia Duff   YOB: 1980   Date of Visit: 11/26/2018       To Whom It May Concern: It is my medical opinion that Fito Jack may return to work on 11/28/2018  If you have any questions or concerns, please don't hesitate to call           Sincerely,        Joella Lesch, PA-C    CC: No Recipients

## 2018-11-26 NOTE — PATIENT INSTRUCTIONS
Take Tessalon and albuterol as prescribed  Fluids and rest  Nasal saline spray; Afrin if severe congestion (do not use for more than 3 days)  Tylenol/Ibuprofen for pain/fever  Salt water gargles and chloraseptic spray  Throat Coat Tea  Warm compresses over sinuses  Steam treatment (utilize proper safety precautions when in contact with hot water/steam)  Follow up with PCP in 3-5 days  Proceed to  ER if symptoms worsen  Cold Symptoms   WHAT YOU NEED TO KNOW:   A cold is an infection caused by a virus  The infection causes your upper respiratory system to become inflamed  Common symptoms of a cold include sneezing, dry throat, a stuffy nose, headache, watery eyes, and a cough  Your cough may be dry, or you may cough up mucus  You may also have muscle aches, joint pain, and tiredness  Rarely, you may have a fever  Most colds go away without treatment  DISCHARGE INSTRUCTIONS:   Return to the emergency department if:   · You have increased tiredness and weakness  · You are unable to eat  · Your heart is beating much faster than usual for you  · You see white spots in the back of your throat and your neck is swollen and sore to the touch  · You see pinpoint or larger reddish-purple dots on your skin  Contact your healthcare provider if:   · You have a fever higher than 102°F (38 9°C)  · You have new or worsening shortness of breath  · You have thick nasal drainage for more than 2 days  · Your symptoms do not improve or get worse within 5 days  · You have questions or concerns about your condition or care  Medicines: The following medicines may be suggested by your healthcare provider to decrease your cold symptoms  These medicines are available without a doctor's order  Ask which medicines to take and when to take them  Follow directions  · NSAIDs or acetaminophen  help to bring down a fever or decrease pain  · Decongestants  help decrease nasal stuffiness       · Antihistamines help decrease sneezing and a runny nose  · Cough suppressants  help decrease how much you cough  · Expectorants  help loosen mucus so you can cough it up  · Take your medicine as directed  Contact your healthcare provider if you think your medicine is not helping or if you have side effects  Tell him of her if you are allergic to any medicine  Keep a list of the medicines, vitamins, and herbs you take  Include the amounts, and when and why you take them  Bring the list or the pill bottles to follow-up visits  Carry your medicine list with you in case of an emergency  Symptom relief: The following may help relieve cold symptoms, such as a dry throat and congestion:  · Gargle with mouthwash or warm salt water as directed  · Suck on throat lozenges or hard candy  · Use a cold or warm vaporizer or humidifier to ease your breathing  · Rest for at least 2 days and then as needed to decrease tiredness and weakness  · Use petroleum based jelly around your nostrils to decrease irritation from blowing your nose  Drink liquids:  Liquids will help thin and loosen thick mucus so you can cough it up  Liquids will also keep you hydrated  Ask your healthcare provider which liquids are best for you and how much to drink each day  Prevent the spread of germs: You can spread your cold germs to others for at least 3 days after your symptoms start  Wash your hands often  Do not share items, such as eating utensils  Cover your nose and mouth when you cough or sneeze using the crook of your elbow instead of your hands  Throw used tissues in the garbage  Do not smoke:  Smoking may worsen your symptoms and increase the length of time you feel sick  Talk with your healthcare provider if you need help to stop smoking  Follow up with your healthcare provider as directed:  Write down your questions so you remember to ask them during your visits     © 2017 [employer] Information is for End User's use only and may not be sold, redistributed or otherwise used for commercial purposes  All illustrations and images included in CareNotes® are the copyrighted property of [employer]  or Jeremi Akbar  The above information is an  only  It is not intended as medical advice for individual conditions or treatments  Talk to your doctor, nurse or pharmacist before following any medical regimen to see if it is safe and effective for you

## 2018-11-26 NOTE — PROGRESS NOTES
[facility] [employer] [facility]        NAME: Nakul Webster is a 41 y o  male  : 1980    MRN: [id number]  DATE: 2018  TIME: 6:02 PM    Assessment and Plan   Viral URI [J06 9]  1  Viral URI  albuterol (VENTOLIN HFA) 90 mcg/act inhaler    benzonatate (TESSALON PERLES) 100 mg capsule         Patient Instructions     Take Tessalon and albuterol as prescribed  Fluids and rest  Nasal saline spray; Afrin if severe congestion (do not use for more than 3 days)  Tylenol/Ibuprofen for pain/fever  Salt water gargles and chloraseptic spray  Throat Coat Tea  Warm compresses over sinuses  Steam treatment (utilize proper safety precautions when in contact with hot water/steam)  Follow up with PCP in 3-5 days  Proceed to  ER if symptoms worsen  Chief Complaint     Chief Complaint   Patient presents with    Cold Like Symptoms     Patient complains of being sick for the past two weeks  Was prescribed Zithromax two weeks aga           History of Present Illness       URI    This is a new problem  Episode onset: Friday night  The problem has been gradually worsening  There has been no fever  Associated symptoms include congestion, coughing, ear pain, a sore throat and wheezing  Pertinent negatives include no abdominal pain, chest pain, diarrhea, dysuria, headaches, joint pain, joint swelling, nausea, neck pain, plugged ear sensation, rash, rhinorrhea, sinus pain, sneezing, swollen glands or vomiting  Treatments tried: Z-malina  The treatment provided no relief  Review of Systems   Review of Systems   Constitutional: Negative for activity change, appetite change, chills and fever  HENT: Positive for congestion, ear pain and sore throat  Negative for dental problem, ear discharge, facial swelling, postnasal drip, rhinorrhea, sinus pain, sinus pressure, sneezing and trouble swallowing  Eyes: Negative for itching  Respiratory: Positive for cough and wheezing  Negative for chest tightness and shortness of breath  Cardiovascular: Negative for chest pain and palpitations  Gastrointestinal: Negative for abdominal pain, constipation, diarrhea, nausea and vomiting  Genitourinary: Negative for dysuria  Musculoskeletal: Negative for joint pain, myalgias and neck pain  Skin: Negative for rash  Neurological: Negative for dizziness, weakness, light-headedness and headaches           Current Medications       Current Outpatient Prescriptions:     amLODIPine (NORVASC) 5 mg tablet, Take 1 tablet (5 mg total) by mouth daily, Disp: 90 tablet, Rfl: 1    ergocalciferol (VITAMIN D2) 50,000 units, Take 1 capsule by mouth once a week, Disp: , Rfl:     fexofenadine (ALLEGRA) 30 MG tablet, Take 30 mg by mouth daily, Disp: , Rfl:     fluticasone (FLONASE) 50 mcg/act nasal spray, 1 spray into each nostril daily, Disp: , Rfl:     Multiple Vitamin (MULTI-DAY VITAMINS) TABS, Take by mouth, Disp: , Rfl:     albuterol (VENTOLIN HFA) 90 mcg/act inhaler, Inhale 2 puffs every 6 (six) hours as needed for wheezing, Disp: 1 Inhaler, Rfl: 0    benzonatate (TESSALON PERLES) 100 mg capsule, Take 1 capsule (100 mg total) by mouth 3 (three) times a day as needed for cough, Disp: 20 capsule, Rfl: 0    tobramycin (TOBREX) 0 3 % SOLN, Administer 1 drop to both eyes every 4 (four) hours while awake (Patient not taking: Reported on 11/26/2018 ), Disp: 1 Bottle, Rfl: 0    Current Allergies     Allergies as of 11/26/2018 - Reviewed 11/26/2018   Allergen Reaction Noted    Cefaclor  06/09/2014            The following portions of the patient's history were reviewed and updated as appropriate: allergies, current medications, past family history, past medical history, past social history, past surgical history and problem list      Past Medical History:   Diagnosis Date    Acute bronchiolitis with bronchospasm     LAST ASSESSED: 11/25/16    Hypertension        Past Surgical History:   Procedure Laterality Date    TONSILLECTOMY  2013    [facility]       Family History   Problem Relation Age of Onset    Hypertension Mother     Thyroid nodules Mother     Ulcerative colitis Mother     Hypertension Father     Heart attack Paternal Grandfather 45    Heart failure Paternal Grandfather     Heart attack Paternal Uncle     Coronary artery disease Paternal Uncle         CABG    Coronary artery disease Paternal Aunt     Heart attack Paternal Aunt     Heart disease Maternal Grandfather         CARDIAC DISORDER    Heart disease Paternal Grandmother         CARDIAC DISORDER    Hypertension Paternal Grandmother     Kidney failure Paternal Grandmother         RENAL         Medications have been verified  Objective   /84 (BP Location: Right arm, Patient Position: Sitting)   Pulse 86   Temp 99 4 °F (37 4 °C) (Tympanic)   Resp 16   Ht 6' 4" (1 93 m)   Wt 133 kg (294 lb)   SpO2 98%   BMI 35 79 kg/m²        Physical Exam     Physical Exam   Constitutional: He is oriented to person, place, and time  He appears well-developed and well-nourished  No distress  HENT:   Head: Normocephalic  Right Ear: External ear normal    Left Ear: External ear normal    Nose: Nose normal    Mouth/Throat: Oropharynx is clear and moist  No oropharyngeal exudate  Eyes: Conjunctivae are normal    Cardiovascular: Normal rate, regular rhythm, normal heart sounds and intact distal pulses  Exam reveals no gallop and no friction rub  No murmur heard  Pulmonary/Chest: Effort normal  No respiratory distress  He has wheezes  He has no rales  He exhibits no tenderness  Lymphadenopathy:     He has no cervical adenopathy  Neurological: He is alert and oriented to person, place, and time  Skin: Skin is warm  He is not diaphoretic  Psychiatric: He has a normal mood and affect  His behavior is normal  Judgment and thought content normal    Vitals reviewed

## 2019-01-02 ENCOUNTER — OFFICE VISIT (OUTPATIENT)
Dept: FAMILY MEDICINE CLINIC | Facility: CLINIC | Age: 39
End: 2019-01-02
Payer: COMMERCIAL

## 2019-01-02 VITALS
BODY MASS INDEX: 35.85 KG/M2 | HEART RATE: 88 BPM | RESPIRATION RATE: 16 BRPM | OXYGEN SATURATION: 97 % | TEMPERATURE: 98.4 F | HEIGHT: 76 IN | WEIGHT: 294.4 LBS | SYSTOLIC BLOOD PRESSURE: 154 MMHG | DIASTOLIC BLOOD PRESSURE: 94 MMHG

## 2019-01-02 DIAGNOSIS — I10 ESSENTIAL HYPERTENSION: ICD-10-CM

## 2019-01-02 DIAGNOSIS — J01.00 ACUTE NON-RECURRENT MAXILLARY SINUSITIS: Primary | ICD-10-CM

## 2019-01-02 PROCEDURE — 99213 OFFICE O/P EST LOW 20 MIN: CPT | Performed by: FAMILY MEDICINE

## 2019-01-02 RX ORDER — MELATONIN
1000 DAILY
COMMUNITY

## 2019-01-02 RX ORDER — AMLODIPINE BESYLATE 5 MG/1
5 TABLET ORAL DAILY
Qty: 90 TABLET | Refills: 1 | Status: SHIPPED | OUTPATIENT
Start: 2019-01-02 | End: 2019-10-27 | Stop reason: SDUPTHER

## 2019-01-02 RX ORDER — AZITHROMYCIN 250 MG/1
TABLET, FILM COATED ORAL
Qty: 6 TABLET | Refills: 0 | Status: SHIPPED | OUTPATIENT
Start: 2019-01-02 | End: 2019-01-06

## 2019-01-02 NOTE — PROGRESS NOTES
Assessment/Plan:         Diagnoses and all orders for this visit:    Acute non-recurrent maxillary sinusitis  Comments:  return for flu shot when well  Orders:  -     azithromycin (ZITHROMAX) 250 mg tablet; Take 2 tablets today then 1 tablet daily x 4 days    Essential hypertension  -     amLODIPine (NORVASC) 5 mg tablet; Take 1 tablet (5 mg total) by mouth daily    Other orders  -     cholecalciferol (VITAMIN D3) 1,000 units tablet; Take 1,000 Units by mouth daily          Subjective:      Patient ID: Ziyad Dowd is a 41 y o  male  Several days, productive cough  Sinus congestion  Not sleeping well  Teeth hurting  No fevers, chills at times  Didn't take his BP med today  Sinus Problem   Associated symptoms include coughing  Cough         The following portions of the patient's history were reviewed and updated as appropriate: allergies, current medications, past family history, past medical history, past social history, past surgical history and problem list     Review of Systems   Respiratory: Positive for cough  Objective:      /94 (BP Location: Right arm, Patient Position: Sitting, Cuff Size: Standard)   Pulse 88   Temp 98 4 °F (36 9 °C)   Resp 16   Ht 6' 4" (1 93 m)   Wt 134 kg (294 lb 6 4 oz)   SpO2 97%   BMI 35 84 kg/m²          Physical Exam   Constitutional: He is oriented to person, place, and time  He appears well-developed and well-nourished  HENT:   Right Ear: External ear normal    Mouth/Throat: Oropharynx is clear and moist  No oropharyngeal exudate  L TM dull, red; tender over the max sinuses   Neck: Normal range of motion  Neck supple  Cardiovascular: Normal rate, regular rhythm and normal heart sounds  Pulmonary/Chest: Effort normal and breath sounds normal    Neurological: He is alert and oriented to person, place, and time  Skin: Skin is warm  Psychiatric: He has a normal mood and affect   His behavior is normal  Judgment and thought content normal    Vitals reviewed

## 2019-02-22 PROBLEM — M51.369 DEGENERATIVE LUMBAR DISC: Status: ACTIVE | Noted: 2017-02-06

## 2019-02-22 PROBLEM — M51.36 DEGENERATIVE LUMBAR DISC: Status: ACTIVE | Noted: 2017-02-06

## 2019-02-26 ENCOUNTER — OFFICE VISIT (OUTPATIENT)
Dept: FAMILY MEDICINE CLINIC | Facility: CLINIC | Age: 39
End: 2019-02-26
Payer: COMMERCIAL

## 2019-02-26 VITALS
HEIGHT: 76 IN | SYSTOLIC BLOOD PRESSURE: 132 MMHG | HEART RATE: 101 BPM | TEMPERATURE: 98.8 F | DIASTOLIC BLOOD PRESSURE: 84 MMHG | RESPIRATION RATE: 16 BRPM | OXYGEN SATURATION: 98 % | BODY MASS INDEX: 35.56 KG/M2 | WEIGHT: 292 LBS

## 2019-02-26 DIAGNOSIS — J40 BRONCHITIS: Primary | ICD-10-CM

## 2019-02-26 PROCEDURE — 99213 OFFICE O/P EST LOW 20 MIN: CPT | Performed by: INTERNAL MEDICINE

## 2019-02-26 PROCEDURE — 3008F BODY MASS INDEX DOCD: CPT | Performed by: INTERNAL MEDICINE

## 2019-02-26 RX ORDER — LEVOFLOXACIN 500 MG/1
500 TABLET, FILM COATED ORAL EVERY 24 HOURS
Qty: 10 TABLET | Refills: 0 | Status: SHIPPED | OUTPATIENT
Start: 2019-02-26 | End: 2019-03-08

## 2019-02-26 NOTE — LETTER
February 26, 2019     Patient: Ophelia Gay   YOB: 1980   Date of Visit: 2/26/2019       To Whom it May Concern:    Ange Mccarthy is under my professional care  He was seen in my office on 2/26/2019  He may return to work on 2/27/19  Pt also request to have sit stand desk at work  He has herniated disc and relates he feels improved if he is allowed to stand  If you have any questions or concerns, please don't hesitate to call           Sincerely,          Eileen Tucker DO        CC: No Recipients

## 2019-02-26 NOTE — PROGRESS NOTES
Assessment/Plan:         Diagnoses and all orders for this visit:    Bronchitis  -     levofloxacin (LEVAQUIN) 500 mg tablet; Take 1 tablet (500 mg total) by mouth every 24 hours for 10 days          Subjective:      Patient ID: Pasquale Whitaker is a 41 y o  male  Patient started with pain in his nasal passages coughing  Positive sore throat positive head stuffiness positive pressure at his ears  Pressure of the top of his cheeks  He feels his symptoms are moving into his chest   Dry cough he has been ill since Sunday night  Denies fever sweats or chills he he tried NyQuil and DayQuil and Flonase he feels it is not infection  Pt also requests note for work that he can have sit-stand desk  Mom ill at home      The following portions of the patient's history were reviewed and updated as appropriate: He  has a past medical history of Acute bronchiolitis with bronchospasm and Hypertension  He   Patient Active Problem List    Diagnosis Date Noted    Acute conjunctivitis of both eyes 11/06/2018    Fatty liver 02/12/2018    Essential hypertension 06/23/2017    Degenerative lumbar disc 02/06/2017    Abnormal LFTs 01/13/2017    Vitamin D deficiency 01/13/2017    Overweight 04/14/2015     He  has a past surgical history that includes Tonsillectomy (2013)  His family history includes Coronary artery disease in his paternal aunt and paternal uncle; Heart attack in his paternal aunt and paternal uncle; Heart attack (age of onset: 45) in his paternal grandfather; Heart disease in his maternal grandfather and paternal grandmother; Heart failure in his paternal grandfather; Hypertension in his father, mother, and paternal grandmother; Kidney failure in his paternal grandmother; Thyroid nodules in his mother; Ulcerative colitis in his mother  He  reports that he has been smoking  He has been smoking about 0 50 packs per day  He uses smokeless tobacco  He reports that he drinks alcohol   He reports that he does not use drugs  Current Outpatient Medications   Medication Sig Dispense Refill    albuterol (VENTOLIN HFA) 90 mcg/act inhaler Inhale 2 puffs every 6 (six) hours as needed for wheezing 1 Inhaler 0    amLODIPine (NORVASC) 5 mg tablet Take 1 tablet (5 mg total) by mouth daily 90 tablet 1    cholecalciferol (VITAMIN D3) 1,000 units tablet Take 1,000 Units by mouth daily      fexofenadine (ALLEGRA) 30 MG tablet Take 30 mg by mouth daily      fluticasone (FLONASE) 50 mcg/act nasal spray 1 spray into each nostril daily      Multiple Vitamin (MULTI-DAY VITAMINS) TABS Take by mouth      benzonatate (TESSALON PERLES) 100 mg capsule Take 1 capsule (100 mg total) by mouth 3 (three) times a day as needed for cough (Patient not taking: Reported on 2/26/2019) 20 capsule 0    levofloxacin (LEVAQUIN) 500 mg tablet Take 1 tablet (500 mg total) by mouth every 24 hours for 10 days 10 tablet 0     No current facility-administered medications for this visit  Current Outpatient Medications on File Prior to Visit   Medication Sig    albuterol (VENTOLIN HFA) 90 mcg/act inhaler Inhale 2 puffs every 6 (six) hours as needed for wheezing    amLODIPine (NORVASC) 5 mg tablet Take 1 tablet (5 mg total) by mouth daily    cholecalciferol (VITAMIN D3) 1,000 units tablet Take 1,000 Units by mouth daily    fexofenadine (ALLEGRA) 30 MG tablet Take 30 mg by mouth daily    fluticasone (FLONASE) 50 mcg/act nasal spray 1 spray into each nostril daily    Multiple Vitamin (MULTI-DAY VITAMINS) TABS Take by mouth    benzonatate (TESSALON PERLES) 100 mg capsule Take 1 capsule (100 mg total) by mouth 3 (three) times a day as needed for cough (Patient not taking: Reported on 2/26/2019)     No current facility-administered medications on file prior to visit  He is allergic to cefaclor       Review of Systems   Constitutional: Negative  HENT: Negative  Respiratory: Negative  Cardiovascular: Negative      Neurological: Positive for headaches  Objective:      /84 (BP Location: Left arm, Patient Position: Sitting, Cuff Size: Large)   Pulse 101   Temp 98 8 °F (37 1 °C) (Tympanic)   Resp 16   Ht 6' 4" (1 93 m)   Wt 132 kg (292 lb)   SpO2 98%   BMI 35 54 kg/m²          Physical Exam   Constitutional: He appears well-developed and well-nourished  No distress  HENT:   Head: Normocephalic and atraumatic  Right Ear: External ear normal    Left Ear: External ear normal    Mouth/Throat: Oropharynx is clear and moist  No oropharyngeal exudate  Neck: Normal range of motion  Neck supple  No thyromegaly present  Cardiovascular: Normal rate, regular rhythm, normal heart sounds and intact distal pulses  Exam reveals no gallop and no friction rub  No murmur heard  Pulmonary/Chest: Effort normal and breath sounds normal  No respiratory distress  He has no wheezes  He has no rales  He exhibits no tenderness  Abdominal: Soft  Bowel sounds are normal  He exhibits no distension and no mass  There is no tenderness  There is no guarding  Lymphadenopathy:     He has no cervical adenopathy  Skin: He is not diaphoretic

## 2019-05-22 ENCOUNTER — OFFICE VISIT (OUTPATIENT)
Dept: FAMILY MEDICINE CLINIC | Facility: CLINIC | Age: 39
End: 2019-05-22
Payer: COMMERCIAL

## 2019-05-22 VITALS
DIASTOLIC BLOOD PRESSURE: 92 MMHG | BODY MASS INDEX: 36.65 KG/M2 | HEIGHT: 76 IN | SYSTOLIC BLOOD PRESSURE: 152 MMHG | WEIGHT: 301 LBS | HEART RATE: 87 BPM | OXYGEN SATURATION: 98 % | TEMPERATURE: 98.4 F | RESPIRATION RATE: 16 BRPM

## 2019-05-22 DIAGNOSIS — J30.2 SEASONAL ALLERGIES: ICD-10-CM

## 2019-05-22 DIAGNOSIS — M54.9 BACK PAIN, UNSPECIFIED BACK LOCATION, UNSPECIFIED BACK PAIN LATERALITY, UNSPECIFIED CHRONICITY: Primary | ICD-10-CM

## 2019-05-22 PROCEDURE — 99213 OFFICE O/P EST LOW 20 MIN: CPT | Performed by: INTERNAL MEDICINE

## 2019-05-22 PROCEDURE — 3008F BODY MASS INDEX DOCD: CPT | Performed by: INTERNAL MEDICINE

## 2019-05-22 RX ORDER — CYCLOBENZAPRINE HCL 10 MG
10 TABLET ORAL 3 TIMES DAILY PRN
Qty: 15 TABLET | Refills: 1 | Status: SHIPPED | OUTPATIENT
Start: 2019-05-22 | End: 2019-09-25

## 2019-05-22 RX ORDER — NABUMETONE 750 MG/1
750 TABLET, FILM COATED ORAL 2 TIMES DAILY
Qty: 30 TABLET | Refills: 2 | Status: SHIPPED | OUTPATIENT
Start: 2019-05-22 | End: 2019-09-25

## 2019-05-22 RX ORDER — MONTELUKAST SODIUM 10 MG/1
10 TABLET ORAL
Qty: 30 TABLET | Refills: 3 | Status: SHIPPED | OUTPATIENT
Start: 2019-05-22 | End: 2019-08-17 | Stop reason: SDUPTHER

## 2019-08-17 DIAGNOSIS — J30.2 SEASONAL ALLERGIES: ICD-10-CM

## 2019-08-19 RX ORDER — MONTELUKAST SODIUM 10 MG/1
10 TABLET ORAL
Qty: 90 TABLET | Refills: 1 | Status: SHIPPED | OUTPATIENT
Start: 2019-08-19 | End: 2022-04-26 | Stop reason: SDUPTHER

## 2019-09-25 ENCOUNTER — OFFICE VISIT (OUTPATIENT)
Dept: URGENT CARE | Age: 39
End: 2019-09-25
Payer: COMMERCIAL

## 2019-09-25 VITALS
HEART RATE: 84 BPM | DIASTOLIC BLOOD PRESSURE: 95 MMHG | RESPIRATION RATE: 18 BRPM | BODY MASS INDEX: 36.04 KG/M2 | TEMPERATURE: 98.7 F | OXYGEN SATURATION: 98 % | SYSTOLIC BLOOD PRESSURE: 156 MMHG | WEIGHT: 296 LBS | HEIGHT: 76 IN

## 2019-09-25 DIAGNOSIS — J06.9 VIRAL UPPER RESPIRATORY TRACT INFECTION: Primary | ICD-10-CM

## 2019-09-25 PROCEDURE — 99213 OFFICE O/P EST LOW 20 MIN: CPT | Performed by: FAMILY MEDICINE

## 2019-09-25 RX ORDER — PREDNISONE 10 MG/1
50 TABLET ORAL DAILY
Qty: 25 TABLET | Refills: 0 | Status: SHIPPED | COMMUNITY
Start: 2019-09-25 | End: 2019-09-30

## 2019-09-25 NOTE — PATIENT INSTRUCTIONS
Sinus massage and eustachian tube massage 2-3 minutes each, every 2-3 hours  Plain Mucinex or plain guaifenesin to keep the mucus then  Delsym for cough suppression  Follow up with PCP in 3-5 days  Proceed to  ER if symptoms worsen  Upper Respiratory Infection   AMBULATORY CARE:   An upper respiratory infection  is also called a common cold  It can affect your nose, throat, ears, and sinuses  Common signs and symptoms include the following:  Cold symptoms are usually worst for the first 3 to 5 days  You may have any of the following:  · Runny or stuffy nose    · Sneezing and coughing    · Sore throat or hoarseness    · Red, watery, and sore eyes    · Fatigue     · Chills and fever    · Headache, body aches, or sore muscles  Seek care immediately if:   · You have chest pain or trouble breathing  Contact your healthcare provider if:   · You have a fever over 102ºF (39°C)  · Your sore throat gets worse or you see white or yellow spots in your throat  · Your symptoms get worse after 3 to 5 days or your cold is not better in 14 days  · You have a rash anywhere on your skin  · You have large, tender lumps in your neck  · You have thick, green or yellow drainage from your nose  · You cough up thick yellow, green, or bloody mucus  · You have vomiting for more than 24 hours and cannot keep fluids down  · You have a bad earache  · You have questions or concerns about your condition or care  Treatment for a cold: There is no cure for the common cold  Colds are caused by viruses and do not get better with antibiotics  Most people get better in 7 to 14 days  You may continue to cough for 2 to 3 weeks  The following may help decrease your symptoms:  · Decongestants  help reduce nasal congestion and help you breathe more easily  If you take decongestant pills, they may make you feel restless or not able to sleep  Do not use decongestant sprays for more than a few days      · Cough suppressants  help reduce coughing  Ask your healthcare provider which type of cough medicine is best for you  · NSAIDs , such as ibuprofen, help decrease swelling, pain, and fever  NSAIDs can cause stomach bleeding or kidney problems in certain people  If you take blood thinner medicine, always ask your healthcare provider if NSAIDs are safe for you  Always read the medicine label and follow directions  · Acetaminophen  decreases pain and fever  It is available without a doctor's order  Ask how much to take and how often to take it  Follow directions  Read the labels of all other medicines you are using to see if they also contain acetaminophen, or ask your doctor or pharmacist  Acetaminophen can cause liver damage if not taken correctly  Do not use more than 4 grams (4,000 milligrams) total of acetaminophen in one day  Manage your cold:   · Rest as much as possible  Slowly start to do more each day  · Drink more liquids as directed  Liquids will help thin and loosen mucus so you can cough it up  Liquids will also help prevent dehydration  Liquids that help prevent dehydration include water, fruit juice, and broth  Do not drink liquids that contain caffeine  Caffeine can increase your risk for dehydration  Ask your healthcare provider how much liquid to drink each day  · Soothe a sore throat  Gargle with warm salt water  This helps your sore throat feel better  Make salt water by dissolving ¼ teaspoon salt in 1 cup warm water  You may also suck on hard candy or throat lozenges  You may use a sore throat spray  · Use a humidifier or vaporizer  Use a cool mist humidifier or a vaporizer to increase air moisture in your home  This may make it easier for you to breathe and help decrease your cough  · Use saline nasal drops as directed  These help relieve congestion  · Apply petroleum-based jelly around the outside of your nostrils  This can decrease irritation from blowing your nose  · Do not smoke  Nicotine and other chemicals in cigarettes and cigars can make your symptoms worse  They can also cause infections such as bronchitis or pneumonia  Ask your healthcare provider for information if you currently smoke and need help to quit  E-cigarettes or smokeless tobacco still contain nicotine  Talk to your healthcare provider before you use these products  Prevent spreading your cold to others:   · Try to stay away from other people during the first 2 to 3 days of your cold when it is more easily spread  · Do not share food or drinks  · Do not share hand towels with household members  · Wash your hands often, especially after you blow your nose  Turn away from other people and cover your mouth and nose with a tissue when you sneeze or cough  Follow up with your healthcare provider as directed:  Write down your questions so you remember to ask them during your visits  © 2017 [employer] Information is for End User's use only and may not be sold, redistributed or otherwise used for commercial purposes  All illustrations and images included in CareNotes® are the copyrighted property of [employer]  or Jeremi Akbar  The above information is an  only  It is not intended as medical advice for individual conditions or treatments  Talk to your doctor, nurse or pharmacist before following any medical regimen to see if it is safe and effective for you  Prednisone (By mouth)   Prednisone (PRED-ni-sone)  Treats many diseases and conditions, especially problems related to inflammation  This medicine is a corticosteroid  Brand Name(s): Contrast Allergy PreMed Pack, Glenda, predniSONE Intensol   There may be other brand names for this medicine  When This Medicine Should Not Be Used: This medicine is not right for everyone  Do not use if you had an allergic reaction to prednisone or if you are pregnant    How to Use This Medicine: Liquid, Tablet, Delayed Release Tablet  · Take your medicine as directed  Your dose may need to be changed several times to find what works best for you  · It is best to take this medicine with food or milk  · Swallow the delayed-release tablet whole  Do not crush, break, or chew it  · Measure the oral liquid medicine with a marked measuring spoon, oral syringe, or medicine cup  · Missed dose: Take a dose as soon as you remember  If it is almost time for your next dose, wait until then and take a regular dose  Do not take extra medicine to make up for a missed dose  · Store the medicine in a closed container at room temperature, away from heat, moisture, and direct light  Do not freeze the oral liquid  Drugs and Foods to Avoid:   Ask your doctor or pharmacist before using any other medicine, including over-the-counter medicines, vitamins, and herbal products  · Tell your doctor if you use any of the following:  ¨ Aminoglutethimide, amphotericin B, carbamazepine, cholestyramine, cyclosporine, digoxin, isoniazid, ketoconazole, phenobarbital, phenytoin, or rifampin  ¨ Blood thinner, such as warfarin  ¨ NSAID pain or arthritis medicine, such as aspirin, diclofenac, ibuprofen, naproxen, celecoxib  ¨ Diuretic (water pill)  ¨ Diabetes medicine  ¨ Macrolide antibiotic, such as azithromycin, clarithromycin, erythromycin  ¨ Estrogen, including birth control pills or hormone replacement therapy  · This medicine may interfere with vaccines  Ask your doctor before you get a flu shot or any other vaccines  Warnings While Using This Medicine:   · It is not safe to take this medicine during pregnancy  It could harm an unborn baby  Tell your doctor right away if you become pregnant  · Tell your doctor if you are breastfeeding or if you have kidney problems, heart failure, high blood pressure, a recent heart attack, diabetes, glaucoma, osteoporosis, or thyroid problems  Tell your doctor about any infection you have   Also tell your doctor if you have had mental or emotional problems (such as depression) or stomach or bowel problems (such as an ulcer or diverticulitis)  · This medicine may cause the following problems:  ¨ Mood or behavior changes  ¨ Higher blood pressure, retaining water, changes in salt or potassium levels in your body  ¨ Cataracts or glaucoma (with long-term use)  ¨ Weak bones or osteoporosis (with long-term use)  ¨ Slow growth in children (with long-term use)  ¨ Muscle problems (with high doses, especially if you have myasthenia gravis or similar nerve and muscle problems)  · Do not stop using this medicine suddenly  Your doctor will need to slowly decrease your dose before you stop it completely  · This medicine could cause you to get infections more easily  Tell your doctor right away if you are exposed to chicken pox, measles, or other serious infection  Tell your doctor if you had a serious infection in the past, such as tuberculosis or herpes  · Tell your doctor about any extra stress or anxiety in your life  Your dose might need to be changed for a short time  · Tell any doctor or dentist who treats you that you are using this medicine  This medicine may affect certain medical test results  · Keep all medicine out of the reach of children  Never share your medicine with anyone    Possible Side Effects While Using This Medicine:   Call your doctor right away if you notice any of these side effects:  · Allergic reaction: Itching or hives, swelling in your face or hands, swelling or tingling in your mouth or throat, chest tightness, trouble breathing  · Dark freckles, skin color changes, coldness, weakness, tiredness, nausea, vomiting, weight loss  · Depression, unusual thoughts, feelings, or behaviors, trouble sleeping  · Fever, chills, cough, sore throat, and body aches  · Muscle pain or weakness  · Rapid weight gain, swelling in your hands, ankles, or feet  · Severe stomach pain, nausea, vomiting, or red or black stools  · Skin changes or growths  · Trouble seeing, eye pain, headache  If you notice these less serious side effects, talk with your doctor:   · Increased appetite  · Round, puffy face  · Weight gain around your neck, upper back, breast, face, or waist  If you notice other side effects that you think are caused by this medicine, tell your doctor  Call your doctor for medical advice about side effects  You may report side effects to FDA at 1-201-FDA-5226  © 2017 [employer] Information is for End User's use only and may not be sold, redistributed or otherwise used for commercial purposes  The above information is an  only  It is not intended as medical advice for individual conditions or treatments  Talk to your doctor, nurse or pharmacist before following any medical regimen to see if it is safe and effective for you

## 2019-09-25 NOTE — PROGRESS NOTES
[facility]        NAME: Clari Coffman is a 42 y o  male  : 1980    MRN: [id number]  DATE: 2019  TIME: 5:53 PM    Assessment and Plan   Viral upper respiratory tract infection [J06 9]  1  Viral upper respiratory tract infection  predniSONE 10 mg tablet         Patient Instructions     Sinus massage and eustachian tube massage 2-3 minutes each, every 2-3 hours  Plain Mucinex or plain guaifenesin to keep the mucus then  Delsym for cough suppression  Follow up with PCP in 3-5 days  Proceed to  ER if symptoms worsen  Chief Complaint     Chief Complaint   Patient presents with    Cold Like Symptoms     Pt c/o sinus pain/pressure, b/l ear congestion, post-nasal drip since Monday started with productive cough today  Denies fever  Pt has been taking AlkaSeltzer Cold and Mucinex for symptoms  History of Present Illness       Cold Like Symptoms (Pt c/o sinus pain/pressure, b/l ear congestion, post-nasal drip since Monday started with productive cough today  Denies fever  Pt has been taking AlkaSeltzer Cold and Mucinex for symptoms  )    URI    This is a new problem  The current episode started in the past 7 days  The problem has been unchanged  There has been no fever  Associated symptoms include congestion, coughing, ear pain and rhinorrhea  Review of Systems   Review of Systems   Constitutional: Positive for fatigue  Negative for fever  HENT: Positive for congestion, ear pain and rhinorrhea  Respiratory: Positive for cough  Cardiovascular: Negative            Current Medications       Current Outpatient Medications:     albuterol (VENTOLIN HFA) 90 mcg/act inhaler, Inhale 2 puffs every 6 (six) hours as needed for wheezing, Disp: 1 Inhaler, Rfl: 0    amLODIPine (NORVASC) 5 mg tablet, Take 1 tablet (5 mg total) by mouth daily, Disp: 90 tablet, Rfl: 1    cholecalciferol (VITAMIN D3) 1,000 units tablet, Take 1,000 Units by mouth daily, Disp: , Rfl:    fexofenadine (ALLEGRA) 30 MG tablet, Take 30 mg by mouth daily, Disp: , Rfl:     fluticasone (FLONASE) 50 mcg/act nasal spray, 1 spray into each nostril daily, Disp: , Rfl:     montelukast (SINGULAIR) 10 mg tablet, TAKE 1 TABLET (10 MG TOTAL) BY MOUTH DAILY AT BEDTIME, Disp: 90 tablet, Rfl: 1    Multiple Vitamin (MULTI-DAY VITAMINS) TABS, Take by mouth, Disp: , Rfl:     predniSONE 10 mg tablet, Take 5 tablets (50 mg total) by mouth daily for 5 days, Disp: 25 tablet, Rfl: 0    Current Allergies     Allergies as of 09/25/2019 - Reviewed 09/25/2019   Allergen Reaction Noted    Cefaclor  06/09/2014            The following portions of the patient's history were reviewed and updated as appropriate: allergies, current medications, past family history, past medical history, past social history, past surgical history and problem list      Past Medical History:   Diagnosis Date    Acute bronchiolitis with bronchospasm     LAST ASSESSED: 11/25/16    Hypertension        Past Surgical History:   Procedure Laterality Date    TONSILLECTOMY  2013    [facility]       Family History   Problem Relation Age of Onset    Hypertension Mother     Thyroid nodules Mother     Ulcerative colitis Mother     Hypertension Father     Heart attack Paternal Grandfather 45    Heart failure Paternal Grandfather     Heart attack Paternal Uncle     Coronary artery disease Paternal Uncle         CABG    Coronary artery disease Paternal Aunt     Heart attack Paternal Aunt     Heart disease Maternal Grandfather         CARDIAC DISORDER    Heart disease Paternal Grandmother         CARDIAC DISORDER    Hypertension Paternal Grandmother     Kidney failure Paternal Grandmother         RENAL         Medications have been verified          Objective   /95   Pulse 84   Temp 98 7 °F (37 1 °C)   Resp 18   Ht 6' 4" (1 93 m)   Wt 134 kg (296 lb)   SpO2 98%   BMI 36 03 kg/m²        Physical Exam     Physical Exam Constitutional: He appears well-developed  HENT:   Right Ear: External ear normal    Left Ear: External ear normal    Nose: Nose normal    Mouth/Throat: Oropharynx is clear and moist  No oropharyngeal exudate  Eyes: Conjunctivae are normal    Neck: Normal range of motion  Neck supple  Cardiovascular: Normal rate, regular rhythm and normal heart sounds  No murmur heard  Pulmonary/Chest: Effort normal and breath sounds normal  No respiratory distress  He has no wheezes  He has no rales  He exhibits no tenderness  Lymphadenopathy:     He has no cervical adenopathy

## 2019-10-09 ENCOUNTER — OFFICE VISIT (OUTPATIENT)
Dept: FAMILY MEDICINE CLINIC | Facility: CLINIC | Age: 39
End: 2019-10-09
Payer: COMMERCIAL

## 2019-10-09 VITALS
TEMPERATURE: 96.6 F | DIASTOLIC BLOOD PRESSURE: 98 MMHG | RESPIRATION RATE: 18 BRPM | HEART RATE: 87 BPM | SYSTOLIC BLOOD PRESSURE: 158 MMHG | WEIGHT: 291 LBS | HEIGHT: 76 IN | OXYGEN SATURATION: 98 % | BODY MASS INDEX: 35.44 KG/M2

## 2019-10-09 DIAGNOSIS — J02.9 PHARYNGITIS, UNSPECIFIED ETIOLOGY: Primary | ICD-10-CM

## 2019-10-09 PROBLEM — H10.33 ACUTE CONJUNCTIVITIS OF BOTH EYES: Status: RESOLVED | Noted: 2018-11-06 | Resolved: 2019-10-09

## 2019-10-09 LAB — S PYO AG THROAT QL: NEGATIVE

## 2019-10-09 PROCEDURE — 99213 OFFICE O/P EST LOW 20 MIN: CPT | Performed by: PHYSICIAN ASSISTANT

## 2019-10-09 PROCEDURE — 87070 CULTURE OTHR SPECIMN AEROBIC: CPT | Performed by: PHYSICIAN ASSISTANT

## 2019-10-09 PROCEDURE — 3008F BODY MASS INDEX DOCD: CPT | Performed by: PHYSICIAN ASSISTANT

## 2019-10-09 PROCEDURE — 87880 STREP A ASSAY W/OPTIC: CPT | Performed by: PHYSICIAN ASSISTANT

## 2019-10-09 RX ORDER — AZITHROMYCIN 250 MG/1
TABLET, FILM COATED ORAL
Qty: 6 TABLET | Refills: 0 | Status: SHIPPED | OUTPATIENT
Start: 2019-10-09 | End: 2019-10-14

## 2019-10-09 NOTE — PROGRESS NOTES
Assessment/Plan:     Diagnoses and all orders for this visit:    Pharyngitis, unspecified etiology  Comments:  Rapid strep a in office is negative  P o  Azithromycin ordered and throat culture sent to lab  Orders:  -     Throat culture; Future  -     POCT rapid strepA  -     azithromycin (ZITHROMAX) 250 mg tablet; Take 2 tablets today then 1 tablet daily x 4 days          Subjective:      Patient ID: Diomedes Marshall is a 42 y o  male  Patient presents with 2 and half weeks of sinus headache and pressure  He now has a sore throat with postnasal drip  Patient states 2 weeks ago he was seen at urgent care prescribed p o  Prednisone  Patient states he now has green mucus  The following portions of the patient's history were reviewed and updated as appropriate:   He   Patient Active Problem List    Diagnosis Date Noted    Fatty liver 02/12/2018    Essential hypertension 06/23/2017    Degenerative lumbar disc 02/06/2017    Abnormal LFTs 01/13/2017    Vitamin D deficiency 01/13/2017    Overweight 04/14/2015     Current Outpatient Medications   Medication Sig Dispense Refill    albuterol (VENTOLIN HFA) 90 mcg/act inhaler Inhale 2 puffs every 6 (six) hours as needed for wheezing 1 Inhaler 0    amLODIPine (NORVASC) 5 mg tablet Take 1 tablet (5 mg total) by mouth daily 90 tablet 1    cholecalciferol (VITAMIN D3) 1,000 units tablet Take 1,000 Units by mouth daily      fexofenadine (ALLEGRA) 30 MG tablet Take 30 mg by mouth daily      fluticasone (FLONASE) 50 mcg/act nasal spray 1 spray into each nostril daily      montelukast (SINGULAIR) 10 mg tablet TAKE 1 TABLET (10 MG TOTAL) BY MOUTH DAILY AT BEDTIME 90 tablet 1    Multiple Vitamin (MULTI-DAY VITAMINS) TABS Take by mouth      azithromycin (ZITHROMAX) 250 mg tablet Take 2 tablets today then 1 tablet daily x 4 days 6 tablet 0     No current facility-administered medications for this visit        Current Outpatient Medications on File Prior to Visit Medication Sig    albuterol (VENTOLIN HFA) 90 mcg/act inhaler Inhale 2 puffs every 6 (six) hours as needed for wheezing    amLODIPine (NORVASC) 5 mg tablet Take 1 tablet (5 mg total) by mouth daily    cholecalciferol (VITAMIN D3) 1,000 units tablet Take 1,000 Units by mouth daily    fexofenadine (ALLEGRA) 30 MG tablet Take 30 mg by mouth daily    fluticasone (FLONASE) 50 mcg/act nasal spray 1 spray into each nostril daily    montelukast (SINGULAIR) 10 mg tablet TAKE 1 TABLET (10 MG TOTAL) BY MOUTH DAILY AT BEDTIME    Multiple Vitamin (MULTI-DAY VITAMINS) TABS Take by mouth     No current facility-administered medications on file prior to visit  He is allergic to cefaclor       Review of Systems   Constitutional: Positive for fatigue  Negative for activity change, appetite change, chills and fever  HENT: Positive for congestion, postnasal drip, sinus pressure, sinus pain and sore throat  Negative for ear pain, rhinorrhea and sneezing  Respiratory: Positive for cough  Neurological: Positive for headaches  Objective:        Physical Exam   Constitutional: He is oriented to person, place, and time  He appears well-developed and well-nourished  No distress  Overweight  White   male   HENT:   Head: Normocephalic and atraumatic  Right Ear: Tympanic membrane, external ear and ear canal normal    Left Ear: Tympanic membrane, external ear and ear canal normal    Nose: Nose normal  No rhinorrhea  Right sinus exhibits no maxillary sinus tenderness and no frontal sinus tenderness  Left sinus exhibits no maxillary sinus tenderness and no frontal sinus tenderness  Mouth/Throat: Posterior oropharyngeal erythema present  No oropharyngeal exudate  Eyes: Pupils are equal, round, and reactive to light  Conjunctivae are normal    Neck: Normal range of motion  Neck supple  Cardiovascular: Normal rate, regular rhythm and normal heart sounds  No murmur heard    Pulmonary/Chest: Effort normal and breath sounds normal  No respiratory distress  He has no wheezes  He has no rales  Musculoskeletal: Normal range of motion  Lymphadenopathy:     He has no cervical adenopathy  Neurological: He is alert and oriented to person, place, and time  Skin: He is not diaphoretic  Psychiatric: He has a normal mood and affect  His behavior is normal  Judgment and thought content normal    Nursing note and vitals reviewed

## 2019-10-12 LAB — BACTERIA THROAT CULT: NORMAL

## 2019-10-27 DIAGNOSIS — I10 ESSENTIAL HYPERTENSION: ICD-10-CM

## 2019-10-28 ENCOUNTER — TELEPHONE (OUTPATIENT)
Dept: FAMILY MEDICINE CLINIC | Facility: CLINIC | Age: 39
End: 2019-10-28

## 2019-10-28 RX ORDER — AMLODIPINE BESYLATE 5 MG/1
TABLET ORAL
Qty: 90 TABLET | Refills: 0 | Status: SHIPPED | OUTPATIENT
Start: 2019-10-28 | End: 2020-01-29 | Stop reason: SDUPTHER

## 2019-10-28 NOTE — TELEPHONE ENCOUNTER
Patient needs appointment to be seen for BP  It has been uncontrolled at recent sick visits  Please call to schedule BP check

## 2019-12-19 ENCOUNTER — OFFICE VISIT (OUTPATIENT)
Dept: FAMILY MEDICINE CLINIC | Facility: CLINIC | Age: 39
End: 2019-12-19
Payer: COMMERCIAL

## 2019-12-19 VITALS
TEMPERATURE: 98.9 F | HEIGHT: 76 IN | DIASTOLIC BLOOD PRESSURE: 90 MMHG | SYSTOLIC BLOOD PRESSURE: 140 MMHG | OXYGEN SATURATION: 98 % | BODY MASS INDEX: 34.95 KG/M2 | RESPIRATION RATE: 16 BRPM | WEIGHT: 287 LBS | HEART RATE: 93 BPM

## 2019-12-19 DIAGNOSIS — J06.9 UPPER RESPIRATORY TRACT INFECTION, UNSPECIFIED TYPE: Primary | ICD-10-CM

## 2019-12-19 DIAGNOSIS — I10 ESSENTIAL HYPERTENSION: ICD-10-CM

## 2019-12-19 PROBLEM — J31.0 CHRONIC RHINITIS: Status: ACTIVE | Noted: 2019-12-19

## 2019-12-19 PROCEDURE — 99214 OFFICE O/P EST MOD 30 MIN: CPT | Performed by: PHYSICIAN ASSISTANT

## 2019-12-19 RX ORDER — AMOXICILLIN 500 MG/1
500 CAPSULE ORAL EVERY 8 HOURS SCHEDULED
Qty: 30 CAPSULE | Refills: 0 | Status: SHIPPED | OUTPATIENT
Start: 2019-12-19 | End: 2019-12-29

## 2019-12-19 RX ORDER — LISINOPRIL 5 MG/1
5 TABLET ORAL DAILY
Qty: 90 TABLET | Refills: 1 | Status: SHIPPED | OUTPATIENT
Start: 2019-12-19 | End: 2020-01-29 | Stop reason: SDUPTHER

## 2019-12-19 NOTE — PROGRESS NOTES
Assessment/Plan:     Diagnoses and all orders for this visit:    Upper respiratory tract infection, unspecified type  Comments:  P o  Amoxicillin ordered  Patient to rest increase fluids follow up if persists or worsens  Orders:  -     amoxicillin (AMOXIL) 500 mg capsule; Take 1 capsule (500 mg total) by mouth every 8 (eight) hours for 10 days    Essential hypertension  Comments:  Continue amlodipine at 5 mg  Add lisinopril at 5 mg  Side effects of medication reviewed  Follow-up in 6 weeks  Orders:  -     lisinopril (ZESTRIL) 5 mg tablet; Take 1 tablet (5 mg total) by mouth daily          Subjective:      Patient ID: Brittany Henning is a 42 y o  male  Patient presents with upper respiratory symptoms for the last 6-7 days  Patient taking over-the-counter Tylenol cold and flu with no relief  Blood pressure has been elevated  Patient has been in the office several times with upper respiratory is period he has been on over-the-counter cough and cold preps which may elevate his blood pressure  Pressure is still elevated in the office today  Patient has some edema at amlodipine 5 mg   Will consider adding alternative medication      The following portions of the patient's history were reviewed and updated as appropriate:   He   Patient Active Problem List    Diagnosis Date Noted    Chronic rhinitis 12/19/2019    Fatty liver 02/12/2018    Essential hypertension 06/23/2017    Degenerative lumbar disc 02/06/2017    Abnormal LFTs 01/13/2017    Vitamin D deficiency 01/13/2017    Overweight 04/14/2015     Current Outpatient Medications   Medication Sig Dispense Refill    albuterol (VENTOLIN HFA) 90 mcg/act inhaler Inhale 2 puffs every 6 (six) hours as needed for wheezing 1 Inhaler 0    amLODIPine (NORVASC) 5 mg tablet TAKE 1 TABLET BY MOUTH EVERY DAY 90 tablet 0    cholecalciferol (VITAMIN D3) 1,000 units tablet Take 1,000 Units by mouth daily      fexofenadine (ALLEGRA) 30 MG tablet Take 30 mg by mouth daily      fluticasone (FLONASE) 50 mcg/act nasal spray 1 spray into each nostril daily      montelukast (SINGULAIR) 10 mg tablet TAKE 1 TABLET (10 MG TOTAL) BY MOUTH DAILY AT BEDTIME 90 tablet 1    Multiple Vitamin (MULTI-DAY VITAMINS) TABS Take by mouth      amoxicillin (AMOXIL) 500 mg capsule Take 1 capsule (500 mg total) by mouth every 8 (eight) hours for 10 days 30 capsule 0    lisinopril (ZESTRIL) 5 mg tablet Take 1 tablet (5 mg total) by mouth daily 90 tablet 1     No current facility-administered medications for this visit  Current Outpatient Medications on File Prior to Visit   Medication Sig    albuterol (VENTOLIN HFA) 90 mcg/act inhaler Inhale 2 puffs every 6 (six) hours as needed for wheezing    amLODIPine (NORVASC) 5 mg tablet TAKE 1 TABLET BY MOUTH EVERY DAY    cholecalciferol (VITAMIN D3) 1,000 units tablet Take 1,000 Units by mouth daily    fexofenadine (ALLEGRA) 30 MG tablet Take 30 mg by mouth daily    fluticasone (FLONASE) 50 mcg/act nasal spray 1 spray into each nostril daily    montelukast (SINGULAIR) 10 mg tablet TAKE 1 TABLET (10 MG TOTAL) BY MOUTH DAILY AT BEDTIME    Multiple Vitamin (MULTI-DAY VITAMINS) TABS Take by mouth     No current facility-administered medications on file prior to visit  He is allergic to cefaclor       Review of Systems   Constitutional: Positive for fatigue  Negative for activity change, appetite change, chills and fever  HENT: Positive for congestion, postnasal drip, rhinorrhea and sore throat  Negative for ear pain, sinus pressure, sinus pain and sneezing  Respiratory: Positive for cough  Gastrointestinal: Positive for nausea  Neurological: Negative for headaches  Objective:        Physical Exam   Constitutional: He is oriented to person, place, and time  He appears well-developed and well-nourished  No distress  Overweight  white  male   HENT:   Head: Normocephalic and atraumatic     Right Ear: Tympanic membrane, external ear and ear canal normal    Left Ear: Tympanic membrane, external ear and ear canal normal    Nose: Nose normal  No rhinorrhea  Right sinus exhibits no maxillary sinus tenderness and no frontal sinus tenderness  Left sinus exhibits no maxillary sinus tenderness and no frontal sinus tenderness  Mouth/Throat: Oropharynx is clear and moist  No oropharyngeal exudate or posterior oropharyngeal erythema  Eyes: Pupils are equal, round, and reactive to light  Conjunctivae are normal    Neck: Neck supple  Cardiovascular: Normal rate, regular rhythm and normal heart sounds  No murmur heard  Pulmonary/Chest: Effort normal and breath sounds normal  No respiratory distress  He has no wheezes  He has no rales  Musculoskeletal: He exhibits no edema  Lymphadenopathy:     He has cervical adenopathy  Neurological: He is alert and oriented to person, place, and time  Skin: Skin is warm and dry  No rash noted  He is not diaphoretic  No erythema  Psychiatric: He has a normal mood and affect  His behavior is normal  Judgment and thought content normal    Nursing note and vitals reviewed

## 2019-12-19 NOTE — LETTER
December 19, 2019     Patient: Sean Cuellar   YOB: 1980   Date of Visit: 12/19/2019       To Whom it May Concern:    Regina Roe is under my professional care  He was seen in my office on 12/19/2019  He may return to work on 12/20/2019  If you have any questions or concerns, please don't hesitate to call           Sincerely,          Eyad Benton PA-C        CC: No Recipients

## 2020-01-10 ENCOUNTER — HOSPITAL ENCOUNTER (EMERGENCY)
Facility: HOSPITAL | Age: 40
Discharge: HOME | End: 2020-01-10
Attending: EMERGENCY MEDICINE
Payer: COMMERCIAL

## 2020-01-10 ENCOUNTER — APPOINTMENT (EMERGENCY)
Dept: CT IMAGING | Facility: HOSPITAL | Age: 40
End: 2020-01-10
Payer: COMMERCIAL

## 2020-01-10 VITALS
RESPIRATION RATE: 16 BRPM | HEART RATE: 78 BPM | TEMPERATURE: 97.7 F | BODY MASS INDEX: 34.54 KG/M2 | WEIGHT: 283.73 LBS | SYSTOLIC BLOOD PRESSURE: 151 MMHG | OXYGEN SATURATION: 98 % | DIASTOLIC BLOOD PRESSURE: 83 MMHG

## 2020-01-10 DIAGNOSIS — K44.9 HIATAL HERNIA: ICD-10-CM

## 2020-01-10 DIAGNOSIS — R16.0 HEPATOMEGALY: ICD-10-CM

## 2020-01-10 DIAGNOSIS — K52.9 GASTROENTERITIS: Primary | ICD-10-CM

## 2020-01-10 DIAGNOSIS — R10.9 RIGHT FLANK PAIN: ICD-10-CM

## 2020-01-10 DIAGNOSIS — K76.0 FATTY LIVER: ICD-10-CM

## 2020-01-10 DIAGNOSIS — R10.9 ABDOMINAL PAIN: ICD-10-CM

## 2020-01-10 LAB
ALBUMIN SERPL BCP-MCNC: 3.5 G/DL (ref 3.5–5)
ALP SERPL-CCNC: 70 U/L (ref 46–116)
ALT SERPL W P-5'-P-CCNC: 136 U/L (ref 12–78)
ANION GAP SERPL CALCULATED.3IONS-SCNC: 10 MMOL/L (ref 4–13)
AST SERPL W P-5'-P-CCNC: 47 U/L (ref 5–45)
BASOPHILS # BLD AUTO: 0.04 THOUSANDS/ΜL (ref 0–0.1)
BASOPHILS NFR BLD AUTO: 1 % (ref 0–1)
BILIRUB SERPL-MCNC: 0.8 MG/DL (ref 0.2–1)
BILIRUB UR QL STRIP: NEGATIVE
BUN SERPL-MCNC: 16 MG/DL (ref 5–25)
CALCIUM SERPL-MCNC: 9.2 MG/DL (ref 8.3–10.1)
CHLORIDE SERPL-SCNC: 104 MMOL/L (ref 100–108)
CK MB SERPL-MCNC: 1.6 NG/ML (ref 0–5)
CK MB SERPL-MCNC: <1 % (ref 0–2.5)
CK SERPL-CCNC: 351 U/L (ref 39–308)
CLARITY UR: CLEAR
CO2 SERPL-SCNC: 24 MMOL/L (ref 21–32)
COLOR UR: YELLOW
CREAT SERPL-MCNC: 1.19 MG/DL (ref 0.6–1.3)
EOSINOPHIL # BLD AUTO: 0.1 THOUSAND/ΜL (ref 0–0.61)
EOSINOPHIL NFR BLD AUTO: 1 % (ref 0–6)
ERYTHROCYTE [DISTWIDTH] IN BLOOD BY AUTOMATED COUNT: 13.5 % (ref 11.6–15.1)
GFR SERPL CREATININE-BSD FRML MDRD: 78 ML/MIN/1.73SQ M
GLUCOSE SERPL-MCNC: 118 MG/DL (ref 65–140)
GLUCOSE UR STRIP-MCNC: NEGATIVE MG/DL
HCT VFR BLD AUTO: 53.1 % (ref 36.5–49.3)
HGB BLD-MCNC: 17.9 G/DL (ref 12–17)
HGB UR QL STRIP.AUTO: NEGATIVE
IMM GRANULOCYTES # BLD AUTO: 0.03 THOUSAND/UL (ref 0–0.2)
IMM GRANULOCYTES NFR BLD AUTO: 0 % (ref 0–2)
KETONES UR STRIP-MCNC: NEGATIVE MG/DL
LEUKOCYTE ESTERASE UR QL STRIP: NEGATIVE
LIPASE SERPL-CCNC: 80 U/L (ref 73–393)
LYMPHOCYTES # BLD AUTO: 0.45 THOUSANDS/ΜL (ref 0.6–4.47)
LYMPHOCYTES NFR BLD AUTO: 6 % (ref 14–44)
MCH RBC QN AUTO: 31.9 PG (ref 26.8–34.3)
MCHC RBC AUTO-ENTMCNC: 33.7 G/DL (ref 31.4–37.4)
MCV RBC AUTO: 95 FL (ref 82–98)
MONOCYTES # BLD AUTO: 0.3 THOUSAND/ΜL (ref 0.17–1.22)
MONOCYTES NFR BLD AUTO: 4 % (ref 4–12)
NEUTROPHILS # BLD AUTO: 6.73 THOUSANDS/ΜL (ref 1.85–7.62)
NEUTS SEG NFR BLD AUTO: 88 % (ref 43–75)
NITRITE UR QL STRIP: NEGATIVE
NRBC BLD AUTO-RTO: 0 /100 WBCS
PH UR STRIP.AUTO: 6 [PH]
PLATELET # BLD AUTO: 156 THOUSANDS/UL (ref 149–390)
PMV BLD AUTO: 10.7 FL (ref 8.9–12.7)
POTASSIUM SERPL-SCNC: 4 MMOL/L (ref 3.5–5.3)
PROT SERPL-MCNC: 7 G/DL (ref 6.4–8.2)
PROT UR STRIP-MCNC: NEGATIVE MG/DL
RBC # BLD AUTO: 5.61 MILLION/UL (ref 3.88–5.62)
SODIUM SERPL-SCNC: 138 MMOL/L (ref 136–145)
SP GR UR STRIP.AUTO: 1.01 (ref 1–1.03)
UROBILINOGEN UR QL STRIP.AUTO: 0.2 E.U./DL
WBC # BLD AUTO: 7.65 THOUSAND/UL (ref 4.31–10.16)

## 2020-01-10 PROCEDURE — 82553 CREATINE MB FRACTION: CPT | Performed by: EMERGENCY MEDICINE

## 2020-01-10 PROCEDURE — 96361 HYDRATE IV INFUSION ADD-ON: CPT

## 2020-01-10 PROCEDURE — 36415 COLL VENOUS BLD VENIPUNCTURE: CPT | Performed by: EMERGENCY MEDICINE

## 2020-01-10 PROCEDURE — 82550 ASSAY OF CK (CPK): CPT | Performed by: EMERGENCY MEDICINE

## 2020-01-10 PROCEDURE — 83690 ASSAY OF LIPASE: CPT | Performed by: EMERGENCY MEDICINE

## 2020-01-10 PROCEDURE — 85025 COMPLETE CBC W/AUTO DIFF WBC: CPT | Performed by: EMERGENCY MEDICINE

## 2020-01-10 PROCEDURE — 74176 CT ABD & PELVIS W/O CONTRAST: CPT

## 2020-01-10 PROCEDURE — 99284 EMERGENCY DEPT VISIT MOD MDM: CPT | Performed by: EMERGENCY MEDICINE

## 2020-01-10 PROCEDURE — 96374 THER/PROPH/DIAG INJ IV PUSH: CPT

## 2020-01-10 PROCEDURE — 80053 COMPREHEN METABOLIC PANEL: CPT | Performed by: EMERGENCY MEDICINE

## 2020-01-10 PROCEDURE — 99284 EMERGENCY DEPT VISIT MOD MDM: CPT

## 2020-01-10 PROCEDURE — 81003 URINALYSIS AUTO W/O SCOPE: CPT | Performed by: EMERGENCY MEDICINE

## 2020-01-10 RX ORDER — KETOROLAC TROMETHAMINE 30 MG/ML
30 INJECTION, SOLUTION INTRAMUSCULAR; INTRAVENOUS ONCE
Status: DISCONTINUED | OUTPATIENT
Start: 2020-01-10 | End: 2020-01-10 | Stop reason: HOSPADM

## 2020-01-10 RX ORDER — ONDANSETRON 2 MG/ML
4 INJECTION INTRAMUSCULAR; INTRAVENOUS ONCE
Status: COMPLETED | OUTPATIENT
Start: 2020-01-10 | End: 2020-01-10

## 2020-01-10 RX ORDER — METOCLOPRAMIDE 10 MG/1
10 TABLET ORAL 4 TIMES DAILY
Qty: 28 TABLET | Refills: 0 | Status: SHIPPED | OUTPATIENT
Start: 2020-01-10 | End: 2020-01-17

## 2020-01-10 RX ORDER — DICYCLOMINE HCL 20 MG
20 TABLET ORAL EVERY 6 HOURS
Qty: 20 TABLET | Refills: 0 | Status: SHIPPED | OUTPATIENT
Start: 2020-01-10 | End: 2020-02-13

## 2020-01-10 RX ADMIN — SODIUM CHLORIDE 1000 ML: 0.9 INJECTION, SOLUTION INTRAVENOUS at 07:38

## 2020-01-10 RX ADMIN — ONDANSETRON 4 MG: 2 INJECTION INTRAMUSCULAR; INTRAVENOUS at 07:43

## 2020-01-10 NOTE — ED PROVIDER NOTES
History  Chief Complaint   Patient presents with    Flank Pain     Pt comes to ED c/o RLQ pain radiating to R flank  Pt has had pain for a few days butlast night started with increased pain and n/v/d  Denies fevers  Denies hx kidney stones     Patient is a 42year old male with constant worsening right flank pain since last night and intermittent for a few days  (+) N/V/D  No fever  No urinary sx  Pain radiates to RLQ  No prior kidney stone  No abdominal surgery  No GI bleeding  No ill contacts  No travel  No raw meat, eggs, fish or recent abx use  States he did not drive here  Was last seen in this ED on 3/27/18 for chest pain  [facility] website checked on this patient and no Rx found  History provided by:  Patient and parent   used: No    Flank Pain   Associated symptoms: diarrhea, nausea and vomiting    Associated symptoms: no fever        Prior to Admission Medications   Prescriptions Last Dose Informant Patient Reported? Taking?    Multiple Vitamin (MULTI-DAY VITAMINS) TABS  Self Yes No   Sig: Take by mouth   albuterol (VENTOLIN HFA) 90 mcg/act inhaler  Self No No   Sig: Inhale 2 puffs every 6 (six) hours as needed for wheezing   amLODIPine (NORVASC) 5 mg tablet   No No   Sig: TAKE 1 TABLET BY MOUTH EVERY DAY   cholecalciferol (VITAMIN D3) 1,000 units tablet  Self Yes No   Sig: Take 1,000 Units by mouth daily   fexofenadine (ALLEGRA) 30 MG tablet  Self Yes No   Sig: Take 30 mg by mouth daily   fluticasone (FLONASE) 50 mcg/act nasal spray  Self Yes No   Si spray into each nostril daily   lisinopril (ZESTRIL) 5 mg tablet   No No   Sig: Take 1 tablet (5 mg total) by mouth daily   montelukast (SINGULAIR) 10 mg tablet  Self No No   Sig: TAKE 1 TABLET (10 MG TOTAL) BY MOUTH DAILY AT BEDTIME      Facility-Administered Medications: None       Past Medical History:   Diagnosis Date    Acute bronchiolitis with bronchospasm     LAST ASSESSED: 16    Hypertension        Past Surgical History:   Procedure Laterality Date    [facility]  2013    [facility]       Family History   Problem Relation Age of Onset    Hypertension Mother     Thyroid nodules Mother     Ulcerative colitis Mother     Hypertension Father     Heart attack Paternal Grandfather 45    Heart failure Paternal Grandfather     Heart attack Paternal Uncle     Coronary artery disease Paternal Uncle         CABG    Coronary artery disease Paternal Aunt     Heart attack Paternal Aunt     Heart disease Maternal Grandfather         CARDIAC DISORDER    Heart disease Paternal Grandmother         CARDIAC DISORDER    Hypertension Paternal Grandmother     Kidney failure Paternal Grandmother         RENAL     I have reviewed and agree with the history as documented  Social History     Tobacco Use    Smoking status: Current Every Day Smoker     Packs/day: 0 50    Smokeless tobacco: Current User    Tobacco comment: CURRENTLY SMOKES 1 PACK PER DAY  & PACK YEAR HX  WANTS TO TRY TO QUIT TOBACCO BUT DESIRES ASSISTANCE   Substance Use Topics    Alcohol use: Yes     Comment: DRINKS APPROX 2 TIMES PER MONTH    Drug use: No        Review of Systems   Constitutional: Negative for fever  Gastrointestinal: Positive for abdominal pain, diarrhea, nausea and vomiting  Negative for blood in stool  Genitourinary: Positive for flank pain  Negative for difficulty urinating  All other systems reviewed and are negative  Physical Exam  Physical Exam   Constitutional: He is oriented to person, place, and time  He appears well-developed and well-nourished  He appears distressed (moderate)  HENT:   Head: Normocephalic and atraumatic  Mucous membranes somewhat moist     Eyes: No scleral icterus  Neck: No JVD present  No tracheal deviation present  Cardiovascular: Normal rate, regular rhythm and normal heart sounds  No murmur heard  Pulmonary/Chest: Effort normal and breath sounds normal  No respiratory distress  Abdominal: Soft  Bowel sounds are normal  He exhibits no distension  There is no tenderness  There is no guarding  R CVAT  Musculoskeletal: He exhibits no edema or deformity  Neurological: He is alert and oriented to person, place, and time  Skin: Skin is warm and dry  No rash noted  Psychiatric: He has a normal mood and affect  Nursing note and vitals reviewed  Vital Signs  ED Triage Vitals [01/10/20 0639]   Temperature Pulse Respirations Blood Pressure SpO2   97 7 °F (36 5 °C) 97 18 160/96 97 %      Temp Source Heart Rate Source Patient Position - Orthostatic VS BP Location FiO2 (%)   Oral Monitor Sitting Right arm --      Pain Score       6           Vitals:    01/10/20 0639 01/10/20 0745   BP: 160/96 156/72   Pulse: 97 86   Patient Position - Orthostatic VS: Sitting Sitting         Visual Acuity      ED Medications  Medications   sodium chloride 0 9 % bolus 1,000 mL (1,000 mL Intravenous New Bag 1/10/20 0738)   ketorolac (TORADOL) injection 30 mg (30 mg Intravenous Not Given 1/10/20 0744)   ondansetron (ZOFRAN) injection 4 mg (4 mg Intravenous Given 1/10/20 0743)       Diagnostic Studies  Results Reviewed     Procedure Component Value Units Date/Time    Lipase [[id number]]  (Normal) Collected:  01/10/20 0736    Lab Status:  Final result Specimen:  Blood from Arm, Right Updated:  01/10/20 0821     Lipase 80 u/L     CK Total with Reflex CKMB [[id number]]  (Abnormal) Collected:  01/10/20 0736    Lab Status:  Final result Specimen:  Blood from Arm, Right Updated:  01/10/20 0820     Total  U/L     CKMB [[id number]] Collected:  01/10/20 0736    Lab Status:   In process Specimen:  Blood from Arm, Right Updated:  01/10/20 0820    UA w Reflex to Microscopic w Reflex to Culture [[id number]] Collected:  01/10/20 0753    Lab Status:  Final result Specimen:  Urine, Clean Catch Updated:  01/10/20 0808     Color, UA Yellow     Clarity, UA Clear     Specific Gravity, UA 1 015     pH, UA 6 0     Leukocytes, UA Negative     Nitrite, UA Negative     Protein, UA Negative mg/dl      Glucose, UA Negative mg/dl      Ketones, UA Negative mg/dl      Urobilinogen, UA 0 2 E U /dl      Bilirubin, UA Negative     Blood, UA Negative    Comprehensive metabolic panel [[id number]]  (Abnormal) Collected:  01/10/20 0736    Lab Status:  Final result Specimen:  Blood from Arm, Right Updated:  01/10/20 0800     Sodium 138 mmol/L      Potassium 4 0 mmol/L      Chloride 104 mmol/L      CO2 24 mmol/L      ANION GAP 10 mmol/L      BUN 16 mg/dL      Creatinine 1 19 mg/dL      Glucose 118 mg/dL      Calcium 9 2 mg/dL      AST 47 U/L       U/L      Alkaline Phosphatase 70 U/L      Total Protein 7 0 g/dL      Albumin 3 5 g/dL      Total Bilirubin 0 80 mg/dL      eGFR 78 ml/min/1 73sq m     Narrative:       National Kidney Disease Foundation guidelines for Chronic Kidney Disease (CKD):     Stage 1 with normal or high GFR (GFR > 90 mL/min/1 73 square meters)    Stage 2 Mild CKD (GFR = 60-89 mL/min/1 73 square meters)    Stage 3A Moderate CKD (GFR = 45-59 mL/min/1 73 square meters)    Stage 3B Moderate CKD (GFR = 30-44 mL/min/1 73 square meters)    Stage 4 Severe CKD (GFR = 15-29 mL/min/1 73 square meters)    Stage 5 End Stage CKD (GFR <15 mL/min/1 73 square meters)  Note: GFR calculation is accurate only with a steady state creatinine    CBC and differential [[id number]]  (Abnormal) Collected:  01/10/20 0736    Lab Status:  Final result Specimen:  Blood from Arm, Right Updated:  01/10/20 0745     WBC 7 65 Thousand/uL      RBC 5 61 Million/uL      Hemoglobin 17 9 g/dL      Hematocrit 53 1 %      MCV 95 fL      MCH 31 9 pg      MCHC 33 7 g/dL      RDW 13 5 %      MPV 10 7 fL      Platelets 828 Thousands/uL      nRBC 0 /100 WBCs      Neutrophils Relative 88 %      Immat GRANS % 0 %      Lymphocytes Relative 6 %      Monocytes Relative 4 %      Eosinophils Relative 1 %      Basophils Relative 1 %      Neutrophils Absolute 6 73 Thousands/µL Immature Grans Absolute 0 03 Thousand/uL      Lymphocytes Absolute 0 45 Thousands/µL      Monocytes Absolute 0 30 Thousand/µL      Eosinophils Absolute 0 10 Thousand/µL      Basophils Absolute 0 04 Thousands/µL                  CT renal stone study abdomen pelvis without contrast   ED Interpretation by Augusto Espino MD (01/10 4529)   FINDINGS:   RIGHT KIDNEY AND URETER:   No urinary tract calculi   No hydronephrosis or hydroureter  LEFT KIDNEY AND URETER:   No urinary tract calculi   No hydronephrosis or hydroureter  URINARY BLADDER:    Incompletely distended   Inadequately evaluated  No significant abnormality in the visualized lung bases  Limited low radiation dose noncontrast CT evaluation demonstrates no clinically significant abnormality of spleen or pancreas  The liver is enlarged with fatty infiltrative changes  No calcified gallstones or gallbladder wall thickening noted  There are calcifications within the right adrenal gland   Correlate for prior trauma (hemorrhage) or infection  No ascites or bulky lymphadenopathy on this limited noncontrast study  The stomach is markedly distended and filled with recently ingested food products  Yulisa Steve is a hiatal hernia  There is no evidence of small bowel obstruction  Normal fecal burden throughout the colon  Limited evaluation demonstrates no evidence to suggest acute appendicitis  Atherosclerotic changes in the abdominal aorta and its branch vessels, slightly advanced for the patient's age  No acute fracture or destructive osseous lesion is identified  Impression:     1   No obstructing renal calculi  2   Hepatomegaly with fatty infiltration  3   Hiatal hernia  Workstation performed: [id number]         Final Result by Serena Wood DO (01/10 2002)   1  No obstructing renal calculi  2   Hepatomegaly with fatty infiltration     3   Hiatal hernia  Workstation performed: [id number]                    Procedures  Procedures         ED Course  ED Course as of Bhargav 10 0827   Ly Erickson Bhargav 10, 2020   8937 Labs and CT d/w patient and mother with patient's permission  Nontender abdomen prior to discharge  MDM  Number of Diagnoses or Management Options  Diagnosis management comments: DDx including but not limited to: renal colic, pyelonephritis, UTI, GI etiology, appendicitis, diverticulitis, gastroenteritis, food poisoning, pancreatitis, cholecystitis, biliary colic, doubt AAA, rhabdomyolysis, tumor, zoster  Amount and/or Complexity of Data Reviewed  Clinical lab tests: ordered and reviewed  Tests in the radiology section of CPT®: ordered and reviewed  Decide to obtain previous medical records or to obtain history from someone other than the patient: yes  Obtain history from someone other than the patient: yes  Review and summarize past medical records: yes  Independent visualization of images, tracings, or specimens: yes          Disposition  Final diagnoses:   Gastroenteritis   Right flank pain   Abdominal pain   Hepatomegaly   Fatty liver   Hiatal hernia     Time reflects when diagnosis was documented in both MDM as applicable and the Disposition within this note     Time User Action Codes Description Comment    1/10/2020  8:22 AM Dasia Silvestre Add [K52 9] Gastroenteritis     1/10/2020  8:22 AM Dasia Silvestre Add [R10 9] Right flank pain     1/10/2020  8:22 AM Dasia Silvestre Add [R10 9] Abdominal pain     1/10/2020  8:22 AM Dasia Silvestre Add [R16 0] Hepatomegaly     1/10/2020  8:22 AM Dasia Silvestre Add [K76 0] Fatty liver     1/10/2020  8:22 AM Dasia Silvestre Add [K44 9] Hiatal hernia       ED Disposition     ED Disposition Condition Date/Time Comment    Discharge Stable Fri Bhargav 10, 2020  8:25 AM Burak Melvin [facility] discharge to home/self care              Follow-up Information Follow up With Specialties Details Why Contact Info    Lizy Odonnell PA-C Family Medicine, Physician Assistant Call in 1 day Can use imodium over the counter for diarrhea  Return sooner if increased pain, fever, vomiting, worsening diarrhea, difficulty urinating  Motrin/tylenol for pain  [address] [id number] [place]  [phone]            Patient's Medications   Discharge Prescriptions    DICYCLOMINE (BENTYL) 20 MG TABLET    Take 1 tablet (20 mg total) by mouth every 6 (six) hours for 5 days For crampy abdominal pain       Start Date: 1/10/2020 End Date: 1/15/2020       Order Dose: 20 mg       Quantity: 20 tablet    Refills: 0    METOCLOPRAMIDE (REGLAN) 10 MG TABLET    Take 1 tablet (10 mg total) by mouth 4 (four) times a day for 7 days As needed for nausea       Start Date: 1/10/2020 End Date: 1/17/2020       Order Dose: 10 mg       Quantity: 28 tablet    Refills: 0     No discharge procedures on file      ED Provider  Electronically Signed by           Johanny Pinon MD  01/10/20 7101

## 2020-01-29 ENCOUNTER — OFFICE VISIT (OUTPATIENT)
Dept: FAMILY MEDICINE CLINIC | Facility: CLINIC | Age: 40
End: 2020-01-29
Payer: COMMERCIAL

## 2020-01-29 VITALS
BODY MASS INDEX: 33.73 KG/M2 | SYSTOLIC BLOOD PRESSURE: 144 MMHG | RESPIRATION RATE: 16 BRPM | HEART RATE: 81 BPM | OXYGEN SATURATION: 98 % | HEIGHT: 76 IN | DIASTOLIC BLOOD PRESSURE: 88 MMHG | TEMPERATURE: 98 F | WEIGHT: 277 LBS

## 2020-01-29 DIAGNOSIS — I10 ESSENTIAL HYPERTENSION: ICD-10-CM

## 2020-01-29 PROCEDURE — 3008F BODY MASS INDEX DOCD: CPT | Performed by: INTERNAL MEDICINE

## 2020-01-29 PROCEDURE — 99213 OFFICE O/P EST LOW 20 MIN: CPT | Performed by: INTERNAL MEDICINE

## 2020-01-29 RX ORDER — LISINOPRIL 10 MG/1
10 TABLET ORAL DAILY
Qty: 90 TABLET | Refills: 1 | Status: SHIPPED | OUTPATIENT
Start: 2020-01-29 | End: 2020-07-31

## 2020-01-29 RX ORDER — AMLODIPINE BESYLATE 5 MG/1
5 TABLET ORAL DAILY
Qty: 90 TABLET | Refills: 1 | Status: SHIPPED | OUTPATIENT
Start: 2020-01-29 | End: 2020-07-30

## 2020-01-29 NOTE — PROGRESS NOTES
BMI Counseling: Body mass index is 33 72 kg/m²  The BMI is above normal  Nutrition recommendations include decreasing portion sizes and limiting drinks that contain sugar  Exercise recommendations include exercising 3-5 times per week  No pharmacotherapy was ordered  Patient referred to PCP due to patient being overweight  Tobacco Cessation Counseling: Tobacco cessation counseling was provided  The patient is sincerely urged to quit consumption of tobacco  He is ready to quit tobacco  Medication options and side effects of medication discussed  Patient refused medication  Assessment/Plan:         Diagnoses and all orders for this visit:    Essential hypertension  Comments:  Continue amlodipine at 5 mg  increase lisinopril to 10 mg  Orders:  -     lisinopril (ZESTRIL) 10 mg tablet; Take 1 tablet (10 mg total) by mouth daily  -     amLODIPine (NORVASC) 5 mg tablet; Take 1 tablet (5 mg total) by mouth daily  -     Basic metabolic panel; Future    Essential hypertension  -     lisinopril (ZESTRIL) 10 mg tablet; Take 1 tablet (10 mg total) by mouth daily  -     amLODIPine (NORVASC) 5 mg tablet; Take 1 tablet (5 mg total) by mouth daily  -     Basic metabolic panel; Future          Subjective:      Patient ID: Derick Mares is a 42 y o  male  Pt back for bp check  He relates he recently had viral gastroenteritis  Denies side affects to med  Denies cp/sob/h/a      The following portions of the patient's history were reviewed and updated as appropriate: He  has a past medical history of Acute bronchiolitis with bronchospasm and Hypertension  He   Patient Active Problem List    Diagnosis Date Noted    Chronic rhinitis 12/19/2019    Fatty liver 02/12/2018    Essential hypertension 06/23/2017    Degenerative lumbar disc 02/06/2017    Abnormal LFTs 01/13/2017    Vitamin D deficiency 01/13/2017    Overweight 04/14/2015     He  has a past surgical history that includes Tonsillectomy (2013)    His family history includes Coronary artery disease in his paternal aunt and paternal uncle; Heart attack in his paternal aunt and paternal uncle; Heart attack (age of onset: 45) in his paternal grandfather; Heart disease in his maternal grandfather and paternal grandmother; Heart failure in his paternal grandfather; Hypertension in his father, mother, and paternal grandmother; Kidney failure in his paternal grandmother; Thyroid nodules in his mother; Ulcerative colitis in his mother  He  reports that he has been smoking  He has been smoking about 0 50 packs per day  He uses smokeless tobacco  He reports that he drinks alcohol  He reports that he does not use drugs  Current Outpatient Medications   Medication Sig Dispense Refill    albuterol (VENTOLIN HFA) 90 mcg/act inhaler Inhale 2 puffs every 6 (six) hours as needed for wheezing 1 Inhaler 0    amLODIPine (NORVASC) 5 mg tablet Take 1 tablet (5 mg total) by mouth daily 90 tablet 1    cholecalciferol (VITAMIN D3) 1,000 units tablet Take 1,000 Units by mouth daily      fexofenadine (ALLEGRA) 30 MG tablet Take 30 mg by mouth daily      fluticasone (FLONASE) 50 mcg/act nasal spray 1 spray into each nostril daily      lisinopril (ZESTRIL) 10 mg tablet Take 1 tablet (10 mg total) by mouth daily 90 tablet 1    montelukast (SINGULAIR) 10 mg tablet TAKE 1 TABLET (10 MG TOTAL) BY MOUTH DAILY AT BEDTIME 90 tablet 1    Multiple Vitamin (MULTI-DAY VITAMINS) TABS Take by mouth      dicyclomine (BENTYL) 20 mg tablet Take 1 tablet (20 mg total) by mouth every 6 (six) hours for 5 days For crampy abdominal pain (Patient not taking: Reported on 1/29/2020) 20 tablet 0     No current facility-administered medications for this visit        Current Outpatient Medications on File Prior to Visit   Medication Sig    albuterol (VENTOLIN HFA) 90 mcg/act inhaler Inhale 2 puffs every 6 (six) hours as needed for wheezing    cholecalciferol (VITAMIN D3) 1,000 units tablet Take 1,000 Units by mouth daily    fexofenadine (ALLEGRA) 30 MG tablet Take 30 mg by mouth daily    fluticasone (FLONASE) 50 mcg/act nasal spray 1 spray into each nostril daily    montelukast (SINGULAIR) 10 mg tablet TAKE 1 TABLET (10 MG TOTAL) BY MOUTH DAILY AT BEDTIME    Multiple Vitamin (MULTI-DAY VITAMINS) TABS Take by mouth    dicyclomine (BENTYL) 20 mg tablet Take 1 tablet (20 mg total) by mouth every 6 (six) hours for 5 days For crampy abdominal pain (Patient not taking: Reported on 1/29/2020)     No current facility-administered medications on file prior to visit  He is allergic to cefaclor       Review of Systems   Constitutional: Negative  HENT: Negative  Respiratory: Negative  Cardiovascular: Negative  Gastrointestinal: Negative  Objective:      /88 (BP Location: Left arm, Patient Position: Sitting, Cuff Size: Large)   Pulse 81   Temp 98 °F (36 7 °C) (Temporal)   Resp 16   Ht 6' 4" (1 93 m)   Wt 126 kg (277 lb)   SpO2 98%   BMI 33 72 kg/m²          Physical Exam   Constitutional: He appears well-developed and well-nourished  No distress  HENT:   Head: Normocephalic and atraumatic  Right Ear: External ear normal    Left Ear: External ear normal    Nose: Nose normal    Mouth/Throat: Oropharynx is clear and moist  No oropharyngeal exudate  Neck: Normal range of motion  Neck supple  No thyromegaly present  Cardiovascular: Normal rate, regular rhythm, normal heart sounds and intact distal pulses  Exam reveals no gallop and no friction rub  No murmur heard  Pulmonary/Chest: Effort normal and breath sounds normal  No respiratory distress  He has no wheezes  He has no rales  Lymphadenopathy:     He has no cervical adenopathy  Skin: He is not diaphoretic

## 2020-02-13 ENCOUNTER — OFFICE VISIT (OUTPATIENT)
Dept: FAMILY MEDICINE CLINIC | Facility: CLINIC | Age: 40
End: 2020-02-13
Payer: COMMERCIAL

## 2020-02-13 VITALS
RESPIRATION RATE: 16 BRPM | WEIGHT: 276 LBS | SYSTOLIC BLOOD PRESSURE: 124 MMHG | DIASTOLIC BLOOD PRESSURE: 82 MMHG | HEART RATE: 86 BPM | HEIGHT: 76 IN | BODY MASS INDEX: 33.61 KG/M2 | OXYGEN SATURATION: 98 % | TEMPERATURE: 98.9 F

## 2020-02-13 DIAGNOSIS — J06.9 UPPER RESPIRATORY TRACT INFECTION, UNSPECIFIED TYPE: ICD-10-CM

## 2020-02-13 DIAGNOSIS — J32.9 SINUSITIS, UNSPECIFIED CHRONICITY, UNSPECIFIED LOCATION: Primary | ICD-10-CM

## 2020-02-13 DIAGNOSIS — J40 BRONCHITIS: ICD-10-CM

## 2020-02-13 PROCEDURE — 3079F DIAST BP 80-89 MM HG: CPT | Performed by: INTERNAL MEDICINE

## 2020-02-13 PROCEDURE — 3074F SYST BP LT 130 MM HG: CPT | Performed by: INTERNAL MEDICINE

## 2020-02-13 PROCEDURE — 99213 OFFICE O/P EST LOW 20 MIN: CPT | Performed by: INTERNAL MEDICINE

## 2020-02-13 PROCEDURE — 4004F PT TOBACCO SCREEN RCVD TLK: CPT | Performed by: INTERNAL MEDICINE

## 2020-02-13 PROCEDURE — 3008F BODY MASS INDEX DOCD: CPT | Performed by: INTERNAL MEDICINE

## 2020-02-13 RX ORDER — ALBUTEROL SULFATE 90 UG/1
2 AEROSOL, METERED RESPIRATORY (INHALATION) EVERY 4 HOURS PRN
Qty: 1 INHALER | Refills: 0 | Status: SHIPPED | OUTPATIENT
Start: 2020-02-13 | End: 2020-03-02

## 2020-02-13 RX ORDER — LEVOFLOXACIN 500 MG/1
500 TABLET, FILM COATED ORAL EVERY 24 HOURS
Qty: 10 TABLET | Refills: 0 | Status: SHIPPED | OUTPATIENT
Start: 2020-02-13 | End: 2020-02-23

## 2020-02-13 NOTE — LETTER
February 13, 2020     Patient: Enrique Hodgkins   YOB: 1980   Date of Visit: 2/13/2020       To Whom it May Concern:    Alex Martinez is under my professional care  He was seen in my office on 2/13/2020  He may return to work on 2/18/20  If you have any questions or concerns, please don't hesitate to call           Sincerely,          Mary Alice Mcconnell DO        CC: No Recipients

## 2020-02-13 NOTE — PROGRESS NOTES
Assessment/Plan:         Diagnoses and all orders for this visit:    Sinusitis, unspecified chronicity, unspecified location  -     levofloxacin (LEVAQUIN) 500 mg tablet; Take 1 tablet (500 mg total) by mouth every 24 hours for 10 days    Bronchitis  -     levofloxacin (LEVAQUIN) 500 mg tablet; Take 1 tablet (500 mg total) by mouth every 24 hours for 10 days    Upper respiratory tract infection, unspecified type  -     albuterol (VENTOLIN HFA) 90 mcg/act inhaler; Inhale 2 puffs every 4 (four) hours as needed for wheezing          Subjective:      Patient ID: Sarah Geller is a 42 y o  male  Pt is fatigued  Ill x Monday  Getting worse +coughing +dry cough +sore throat  Denies f/s/c   +congestion +postnasal drip  The following portions of the patient's history were reviewed and updated as appropriate: He  has a past medical history of Acute bronchiolitis with bronchospasm and Hypertension  He   Patient Active Problem List    Diagnosis Date Noted    Chronic rhinitis 12/19/2019    Fatty liver 02/12/2018    Essential hypertension 06/23/2017    Degenerative lumbar disc 02/06/2017    Abnormal LFTs 01/13/2017    Vitamin D deficiency 01/13/2017    Overweight 04/14/2015     He  has a past surgical history that includes Tonsillectomy (2013)  His family history includes Coronary artery disease in his paternal aunt and paternal uncle; Heart attack in his paternal aunt and paternal uncle; Heart attack (age of onset: 45) in his paternal grandfather; Heart disease in his maternal grandfather and paternal grandmother; Heart failure in his paternal grandfather; Hypertension in his father, mother, and paternal grandmother; Kidney failure in his paternal grandmother; Thyroid nodules in his mother; Ulcerative colitis in his mother  He  reports that he has been smoking  He has a 8 50 pack-year smoking history  He has never used smokeless tobacco  He reports that he drinks alcohol   He reports that he does not use drugs  Current Outpatient Medications   Medication Sig Dispense Refill    albuterol (VENTOLIN HFA) 90 mcg/act inhaler Inhale 2 puffs every 4 (four) hours as needed for wheezing 1 Inhaler 0    amLODIPine (NORVASC) 5 mg tablet Take 1 tablet (5 mg total) by mouth daily 90 tablet 1    cholecalciferol (VITAMIN D3) 1,000 units tablet Take 1,000 Units by mouth daily      fexofenadine (ALLEGRA) 30 MG tablet Take 30 mg by mouth daily      fluticasone (FLONASE) 50 mcg/act nasal spray 1 spray into each nostril daily      lisinopril (ZESTRIL) 10 mg tablet Take 1 tablet (10 mg total) by mouth daily 90 tablet 1    montelukast (SINGULAIR) 10 mg tablet TAKE 1 TABLET (10 MG TOTAL) BY MOUTH DAILY AT BEDTIME 90 tablet 1    Multiple Vitamin (MULTI-DAY VITAMINS) TABS Take by mouth      levofloxacin (LEVAQUIN) 500 mg tablet Take 1 tablet (500 mg total) by mouth every 24 hours for 10 days 10 tablet 0     No current facility-administered medications for this visit  Current Outpatient Medications on File Prior to Visit   Medication Sig    amLODIPine (NORVASC) 5 mg tablet Take 1 tablet (5 mg total) by mouth daily    cholecalciferol (VITAMIN D3) 1,000 units tablet Take 1,000 Units by mouth daily    fexofenadine (ALLEGRA) 30 MG tablet Take 30 mg by mouth daily    fluticasone (FLONASE) 50 mcg/act nasal spray 1 spray into each nostril daily    lisinopril (ZESTRIL) 10 mg tablet Take 1 tablet (10 mg total) by mouth daily    montelukast (SINGULAIR) 10 mg tablet TAKE 1 TABLET (10 MG TOTAL) BY MOUTH DAILY AT BEDTIME    Multiple Vitamin (MULTI-DAY VITAMINS) TABS Take by mouth     No current facility-administered medications on file prior to visit  He is allergic to cefaclor       Review of Systems   Constitutional: Negative for chills and fever  HENT: Positive for congestion, postnasal drip, sore throat and voice change  Respiratory: Positive for cough  Negative for shortness of breath      Cardiovascular: Negative  Neurological: Positive for headaches  Objective:      /82 (BP Location: Left arm, Patient Position: Sitting, Cuff Size: Large)   Pulse 86   Temp 98 9 °F (37 2 °C) (Temporal)   Resp 16   Ht 6' 4" (1 93 m)   Wt 125 kg (276 lb)   SpO2 98%   BMI 33 60 kg/m²          Physical Exam   Constitutional: He appears well-developed and well-nourished  No distress  HENT:   Head: Normocephalic and atraumatic  Right Ear: External ear normal    Left Ear: External ear normal    Nose: Nose normal    Mouth/Throat: Oropharynx is clear and moist  No oropharyngeal exudate  Neck: Normal range of motion  Neck supple  No thyromegaly present  Cardiovascular: Normal rate, regular rhythm and normal heart sounds  Exam reveals no gallop and no friction rub  No murmur heard  Pulmonary/Chest: Effort normal and breath sounds normal  No stridor  No respiratory distress  He has no wheezes  Abdominal: Soft  Bowel sounds are normal  He exhibits no distension  There is no tenderness  There is no guarding  Lymphadenopathy:     He has no cervical adenopathy  Skin: He is not diaphoretic

## 2020-03-02 DIAGNOSIS — J06.9 UPPER RESPIRATORY TRACT INFECTION, UNSPECIFIED TYPE: ICD-10-CM

## 2020-03-02 RX ORDER — ALBUTEROL SULFATE 90 UG/1
AEROSOL, METERED RESPIRATORY (INHALATION)
Qty: 6.7 INHALER | Refills: 0 | Status: SHIPPED | OUTPATIENT
Start: 2020-03-02 | End: 2020-09-24 | Stop reason: SDUPTHER

## 2020-05-27 ENCOUNTER — TELEMEDICINE (OUTPATIENT)
Dept: FAMILY MEDICINE CLINIC | Facility: CLINIC | Age: 40
End: 2020-05-27
Payer: COMMERCIAL

## 2020-05-27 VITALS — WEIGHT: 280 LBS | BODY MASS INDEX: 34.1 KG/M2 | TEMPERATURE: 96.6 F | HEIGHT: 76 IN

## 2020-05-27 DIAGNOSIS — J32.9 SINUSITIS, UNSPECIFIED CHRONICITY, UNSPECIFIED LOCATION: Primary | ICD-10-CM

## 2020-05-27 PROCEDURE — 99213 OFFICE O/P EST LOW 20 MIN: CPT | Performed by: INTERNAL MEDICINE

## 2020-05-27 RX ORDER — AMOXICILLIN AND CLAVULANATE POTASSIUM 875; 125 MG/1; MG/1
1 TABLET, FILM COATED ORAL EVERY 12 HOURS SCHEDULED
Qty: 20 TABLET | Refills: 0 | Status: SHIPPED | OUTPATIENT
Start: 2020-05-27 | End: 2020-06-06

## 2020-07-30 DIAGNOSIS — I10 ESSENTIAL HYPERTENSION: ICD-10-CM

## 2020-07-30 RX ORDER — AMLODIPINE BESYLATE 5 MG/1
TABLET ORAL
Qty: 90 TABLET | Refills: 1 | Status: SHIPPED | OUTPATIENT
Start: 2020-07-30 | End: 2021-02-04

## 2020-07-31 DIAGNOSIS — I10 ESSENTIAL HYPERTENSION: ICD-10-CM

## 2020-07-31 RX ORDER — LISINOPRIL 10 MG/1
TABLET ORAL
Qty: 90 TABLET | Refills: 1 | Status: SHIPPED | OUTPATIENT
Start: 2020-07-31 | End: 2021-02-04

## 2020-08-14 ENCOUNTER — OFFICE VISIT (OUTPATIENT)
Dept: FAMILY MEDICINE CLINIC | Facility: CLINIC | Age: 40
End: 2020-08-14
Payer: COMMERCIAL

## 2020-08-14 VITALS
HEIGHT: 76 IN | TEMPERATURE: 97.6 F | WEIGHT: 290.2 LBS | SYSTOLIC BLOOD PRESSURE: 118 MMHG | BODY MASS INDEX: 35.34 KG/M2 | HEART RATE: 76 BPM | DIASTOLIC BLOOD PRESSURE: 82 MMHG | OXYGEN SATURATION: 98 %

## 2020-08-14 DIAGNOSIS — M54.41 ACUTE RIGHT-SIDED LOW BACK PAIN WITH RIGHT-SIDED SCIATICA: Primary | ICD-10-CM

## 2020-08-14 PROCEDURE — 99214 OFFICE O/P EST MOD 30 MIN: CPT | Performed by: INTERNAL MEDICINE

## 2020-08-14 PROCEDURE — 3074F SYST BP LT 130 MM HG: CPT | Performed by: INTERNAL MEDICINE

## 2020-08-14 PROCEDURE — 4004F PT TOBACCO SCREEN RCVD TLK: CPT | Performed by: INTERNAL MEDICINE

## 2020-08-14 PROCEDURE — 3008F BODY MASS INDEX DOCD: CPT | Performed by: INTERNAL MEDICINE

## 2020-08-14 PROCEDURE — 3079F DIAST BP 80-89 MM HG: CPT | Performed by: INTERNAL MEDICINE

## 2020-08-14 RX ORDER — CYCLOBENZAPRINE HCL 10 MG
10 TABLET ORAL
Qty: 15 TABLET | Refills: 1 | Status: SHIPPED | OUTPATIENT
Start: 2020-08-14

## 2020-08-14 RX ORDER — METHYLPREDNISOLONE 4 MG/1
TABLET ORAL
Qty: 21 EACH | Refills: 0 | Status: SHIPPED | OUTPATIENT
Start: 2020-08-14 | End: 2021-11-15

## 2020-08-14 RX ORDER — NABUMETONE 750 MG/1
750 TABLET, FILM COATED ORAL 2 TIMES DAILY
Qty: 30 TABLET | Refills: 1 | Status: SHIPPED | OUTPATIENT
Start: 2020-08-14 | End: 2022-04-26

## 2020-08-14 NOTE — PROGRESS NOTES
Assessment/Plan:         Diagnoses and all orders for this visit:    Acute right-sided low back pain with right-sided sciatica  -     nabumetone (RELAFEN) 750 mg tablet; Take 1 tablet (750 mg total) by mouth 2 (two) times a day  -     methylPREDNISolone 4 MG tablet therapy pack; Use as directed on package  -     cyclobenzaprine (FLEXERIL) 10 mg tablet; Take 1 tablet (10 mg total) by mouth daily at bedtime          Subjective:      Patient ID: Jessica Coleman is a 47 y o  male  He is working in his bathroom and was carrying a vanity and twisted his back with lifting  Pain is in his low back and radiated to buttock and down the back and side of his leg  The following portions of the patient's history were reviewed and updated as appropriate: He  has a past medical history of Acute bronchiolitis with bronchospasm and Hypertension  He   Patient Active Problem List    Diagnosis Date Noted    Chronic rhinitis 12/19/2019    Fatty liver 02/12/2018    Essential hypertension 06/23/2017    Degenerative lumbar disc 02/06/2017    Abnormal LFTs 01/13/2017    Vitamin D deficiency 01/13/2017    Overweight 04/14/2015     He  has a past surgical history that includes Tonsillectomy (2013)  His family history includes Coronary artery disease in his paternal aunt and paternal uncle; Heart attack in his paternal aunt and paternal uncle; Heart attack (age of onset: 45) in his paternal grandfather; Heart disease in his maternal grandfather and paternal grandmother; Heart failure in his paternal grandfather; Hypertension in his father, mother, and paternal grandmother; Kidney failure in his paternal grandmother; Thyroid nodules in his mother; Ulcerative colitis in his mother  He  reports that he has been smoking  He has a 8 50 pack-year smoking history  He has never used smokeless tobacco  He reports current alcohol use  He reports that he does not use drugs    Current Outpatient Medications   Medication Sig Dispense Refill    albuterol (PROVENTIL HFA,VENTOLIN HFA) 90 mcg/act inhaler TAKE 2 PUFFS BY MOUTH EVERY 4 HOURS AS NEEDED FOR WHEEZE 6 7 Inhaler 0    amLODIPine (NORVASC) 5 mg tablet TAKE 1 TABLET BY MOUTH EVERY DAY 90 tablet 1    cholecalciferol (VITAMIN D3) 1,000 units tablet Take 1,000 Units by mouth daily      fexofenadine (ALLEGRA) 30 MG tablet Take 30 mg by mouth daily      fluticasone (FLONASE) 50 mcg/act nasal spray 1 spray into each nostril daily      lisinopril (ZESTRIL) 10 mg tablet TAKE 1 TABLET BY MOUTH EVERY DAY 90 tablet 1    montelukast (SINGULAIR) 10 mg tablet TAKE 1 TABLET (10 MG TOTAL) BY MOUTH DAILY AT BEDTIME 90 tablet 1    Multiple Vitamin (MULTI-DAY VITAMINS) TABS Take by mouth      cyclobenzaprine (FLEXERIL) 10 mg tablet Take 1 tablet (10 mg total) by mouth daily at bedtime 15 tablet 1    methylPREDNISolone 4 MG tablet therapy pack Use as directed on package 21 each 0    nabumetone (RELAFEN) 750 mg tablet Take 1 tablet (750 mg total) by mouth 2 (two) times a day 30 tablet 1     No current facility-administered medications for this visit  Current Outpatient Medications on File Prior to Visit   Medication Sig    albuterol (PROVENTIL HFA,VENTOLIN HFA) 90 mcg/act inhaler TAKE 2 PUFFS BY MOUTH EVERY 4 HOURS AS NEEDED FOR WHEEZE    amLODIPine (NORVASC) 5 mg tablet TAKE 1 TABLET BY MOUTH EVERY DAY    cholecalciferol (VITAMIN D3) 1,000 units tablet Take 1,000 Units by mouth daily    fexofenadine (ALLEGRA) 30 MG tablet Take 30 mg by mouth daily    fluticasone (FLONASE) 50 mcg/act nasal spray 1 spray into each nostril daily    lisinopril (ZESTRIL) 10 mg tablet TAKE 1 TABLET BY MOUTH EVERY DAY    montelukast (SINGULAIR) 10 mg tablet TAKE 1 TABLET (10 MG TOTAL) BY MOUTH DAILY AT BEDTIME    Multiple Vitamin (MULTI-DAY VITAMINS) TABS Take by mouth     No current facility-administered medications on file prior to visit  He is allergic to cefaclor       Review of Systems Constitutional: Negative  HENT: Negative  Respiratory: Negative  Cardiovascular: Negative  Musculoskeletal: Positive for back pain  Neurological: Negative for weakness  Objective:      /82 (BP Location: Right arm, Patient Position: Sitting, Cuff Size: Standard)   Pulse 76   Temp 97 6 °F (36 4 °C)   Ht 6' 4" (1 93 m)   Wt 132 kg (290 lb 3 2 oz)   SpO2 98%   BMI 35 32 kg/m²          Physical Exam  Constitutional:       General: He is not in acute distress  Appearance: Normal appearance  He is not toxic-appearing  HENT:      Head: Normocephalic and atraumatic  Nose: Nose normal    Neck:      Musculoskeletal: Normal range of motion and neck supple  Cardiovascular:      Rate and Rhythm: Normal rate and regular rhythm  Pulmonary:      Effort: Pulmonary effort is normal  No respiratory distress  Breath sounds: Normal breath sounds  Musculoskeletal:      Comments: Le str 5/5   Neurological:      Mental Status: He is alert

## 2020-09-24 DIAGNOSIS — J06.9 UPPER RESPIRATORY TRACT INFECTION, UNSPECIFIED TYPE: ICD-10-CM

## 2020-09-24 RX ORDER — ALBUTEROL SULFATE 90 UG/1
2 AEROSOL, METERED RESPIRATORY (INHALATION) EVERY 4 HOURS PRN
Qty: 6.7 INHALER | Refills: 0 | Status: SHIPPED | OUTPATIENT
Start: 2020-09-24 | End: 2021-01-07 | Stop reason: SDUPTHER

## 2021-01-07 DIAGNOSIS — J06.9 UPPER RESPIRATORY TRACT INFECTION, UNSPECIFIED TYPE: ICD-10-CM

## 2021-01-08 RX ORDER — ALBUTEROL SULFATE 90 UG/1
2 AEROSOL, METERED RESPIRATORY (INHALATION) EVERY 4 HOURS PRN
Qty: 6.7 INHALER | Refills: 0 | Status: SHIPPED | OUTPATIENT
Start: 2021-01-08

## 2021-02-04 DIAGNOSIS — I10 ESSENTIAL HYPERTENSION: ICD-10-CM

## 2021-02-04 RX ORDER — LISINOPRIL 10 MG/1
TABLET ORAL
Qty: 90 TABLET | Refills: 1 | Status: SHIPPED | OUTPATIENT
Start: 2021-02-04 | End: 2021-08-17

## 2021-02-04 RX ORDER — AMLODIPINE BESYLATE 5 MG/1
TABLET ORAL
Qty: 90 TABLET | Refills: 1 | Status: SHIPPED | OUTPATIENT
Start: 2021-02-04 | End: 2021-08-17

## 2021-03-10 ENCOUNTER — TELEMEDICINE (OUTPATIENT)
Dept: FAMILY MEDICINE CLINIC | Facility: CLINIC | Age: 41
End: 2021-03-10
Payer: COMMERCIAL

## 2021-03-10 DIAGNOSIS — Z23 ENCOUNTER FOR IMMUNIZATION: ICD-10-CM

## 2021-03-10 DIAGNOSIS — Z11.52 ENCOUNTER FOR SCREENING FOR COVID-19: Primary | ICD-10-CM

## 2021-03-10 DIAGNOSIS — J40 BRONCHITIS: Primary | ICD-10-CM

## 2021-03-10 PROCEDURE — 4004F PT TOBACCO SCREEN RCVD TLK: CPT | Performed by: INTERNAL MEDICINE

## 2021-03-10 PROCEDURE — U0005 INFEC AGEN DETEC AMPLI PROBE: HCPCS | Performed by: INTERNAL MEDICINE

## 2021-03-10 PROCEDURE — 99213 OFFICE O/P EST LOW 20 MIN: CPT | Performed by: INTERNAL MEDICINE

## 2021-03-10 PROCEDURE — U0003 INFECTIOUS AGENT DETECTION BY NUCLEIC ACID (DNA OR RNA); SEVERE ACUTE RESPIRATORY SYNDROME CORONAVIRUS 2 (SARS-COV-2) (CORONAVIRUS DISEASE [COVID-19]), AMPLIFIED PROBE TECHNIQUE, MAKING USE OF HIGH THROUGHPUT TECHNOLOGIES AS DESCRIBED BY CMS-2020-01-R: HCPCS | Performed by: INTERNAL MEDICINE

## 2021-03-10 RX ORDER — LEVOFLOXACIN 500 MG/1
500 TABLET, FILM COATED ORAL EVERY 24 HOURS
Qty: 10 TABLET | Refills: 0 | Status: SHIPPED | OUTPATIENT
Start: 2021-03-10 | End: 2021-03-20

## 2021-03-10 NOTE — PROGRESS NOTES
Virtual Regular Visit      Assessment/Plan:    Problem List Items Addressed This Visit     None      Visit Diagnoses     Bronchitis    -  Primary    Relevant Medications    levofloxacin (LEVAQUIN) 500 mg tablet    levofloxacin (LEVAQUIN) 500 mg tablet          BMI Counseling: There is no height or weight on file to calculate BMI  The BMI is above normal  Nutrition recommendations include decreasing portion sizes and limiting drinks that contain sugar  Exercise recommendations include exercising 3-5 times per week  No pharmacotherapy was ordered  Patient referred to PCP due to patient being overweight  Reason for visit is see hpi     Encounter provider Belen Tran DO    Provider located at [address] [place] [id number] [phone]      Recent Visits  No visits were found meeting these conditions  Showing recent visits within past 7 days and meeting all other requirements     Today's Visits  Date Type Provider Dept   03/10/21 Telemedicine Belen Tran DO  [facility]   Showing today's visits and meeting all other requirements     Future Appointments  No visits were found meeting these conditions  Showing future appointments within next 150 days and meeting all other requirements        The patient was identified by name and date of birth  Kiya Dominguez was informed that this is a telemedicine visit and that the visit is being conducted through [employer] and patient was informed that this is not a secure, HIPAA-compliant platform  He agrees to proceed     My office door was closed  No one else was in the room  He acknowledged consent and understanding of privacy and security of the video platform  The patient has agreed to participate and understands they can discontinue the visit at any time  Patient is aware this is a billable service       Subjective  Kiya Dominguez is a 47 y o  male see hpi        +head congestion +cough  Denies sob +wheeze - he has inhaler +sore throat denies f/s/c -diarrhea no loss of taste or smell  Ill x few days   Mom tested neg for covid       Past Medical History:   Diagnosis Date    Acute bronchiolitis with bronchospasm     LAST ASSESSED: 11/25/16    Hypertension        Past Surgical History:   Procedure Laterality Date    [facility]Y  2013    [facility]       Current Outpatient Medications   Medication Sig Dispense Refill    albuterol (PROVENTIL HFA,VENTOLIN HFA) 90 mcg/act inhaler Inhale 2 puffs every 4 (four) hours as needed for wheezing 6 7 Inhaler 0    amLODIPine (NORVASC) 5 mg tablet TAKE 1 TABLET BY MOUTH EVERY DAY 90 tablet 1    cholecalciferol (VITAMIN D3) 1,000 units tablet Take 1,000 Units by mouth daily      cyclobenzaprine (FLEXERIL) 10 mg tablet Take 1 tablet (10 mg total) by mouth daily at bedtime 15 tablet 1    fexofenadine (ALLEGRA) 30 MG tablet Take 30 mg by mouth daily      fluticasone (FLONASE) 50 mcg/act nasal spray 1 spray into each nostril daily      levofloxacin (LEVAQUIN) 500 mg tablet Take 1 tablet (500 mg total) by mouth every 24 hours for 10 days 10 tablet 0    levofloxacin (LEVAQUIN) 500 mg tablet Take 1 tablet (500 mg total) by mouth every 24 hours for 10 days 10 tablet 0    lisinopril (ZESTRIL) 10 mg tablet TAKE 1 TABLET BY MOUTH EVERY DAY 90 tablet 1    methylPREDNISolone 4 MG tablet therapy pack Use as directed on package 21 each 0    montelukast (SINGULAIR) 10 mg tablet TAKE 1 TABLET (10 MG TOTAL) BY MOUTH DAILY AT BEDTIME 90 tablet 1    Multiple Vitamin (MULTI-DAY VITAMINS) TABS Take by mouth      nabumetone (RELAFEN) 750 mg tablet Take 1 tablet (750 mg total) by mouth 2 (two) times a day 30 tablet 1     No current facility-administered medications for this visit  Allergies   Allergen Reactions    Cefaclor        Review of Systems   Constitutional: Negative  Negative for fever     HENT: Positive for congestion, postnasal drip and sore throat  Respiratory: Positive for cough and wheezing  Negative for shortness of breath  Gastrointestinal: Negative for diarrhea  Neurological: Positive for headaches  Video Exam    There were no vitals filed for this visit  Physical Exam  Constitutional:       Appearance: Normal appearance  He is ill-appearing  HENT:      Head: Normocephalic and atraumatic  Pulmonary:      Effort: No respiratory distress  Neurological:      Mental Status: He is alert  I spent 8 minutes directly with the patient during this visit      VIRTUAL VISIT DISCLAIMER    Colette Urrutia acknowledges that he has consented to an online visit or consultation  He understands that the online visit is based solely on information provided by him, and that, in the absence of a face-to-face physical evaluation by the physician, the diagnosis he receives is both limited and provisional in terms of accuracy and completeness  This is not intended to replace a full medical face-to-face evaluation by the physician  Colette Urrutia understands and accepts these terms

## 2021-03-10 NOTE — LETTER
March 11, 2021     Patient: Elvi Husbands   YOB: 1980   Date of Visit: 3/10/2021       To Whom it May Concern:    Nisreen Lobo was seen via virtual visit on 3/10/2021  He may return to work on 03/12/2021  If you have any questions or concerns, please don't hesitate to call           Sincerely,          Cedrick Miller DO        CC: No Recipients

## 2021-03-11 LAB — SARS-COV-2 RNA RESP QL NAA+PROBE: NEGATIVE

## 2021-06-09 ENCOUNTER — HOSPITAL ENCOUNTER (EMERGENCY)
Facility: HOSPITAL | Age: 41
Discharge: HOME | End: 2021-06-09
Attending: EMERGENCY MEDICINE | Admitting: EMERGENCY MEDICINE
Payer: COMMERCIAL

## 2021-06-09 ENCOUNTER — APPOINTMENT (EMERGENCY)
Dept: RADIOLOGY | Facility: HOSPITAL | Age: 41
End: 2021-06-09
Payer: COMMERCIAL

## 2021-06-09 VITALS
RESPIRATION RATE: 18 BRPM | HEART RATE: 99 BPM | HEIGHT: 76 IN | DIASTOLIC BLOOD PRESSURE: 88 MMHG | BODY MASS INDEX: 38.31 KG/M2 | TEMPERATURE: 97.9 F | SYSTOLIC BLOOD PRESSURE: 168 MMHG | WEIGHT: 314.6 LBS | OXYGEN SATURATION: 99 %

## 2021-06-09 DIAGNOSIS — S61.411A LACERATION OF RIGHT PALM, INITIAL ENCOUNTER: Primary | ICD-10-CM

## 2021-06-09 PROCEDURE — 99284 EMERGENCY DEPT VISIT MOD MDM: CPT | Performed by: PHYSICIAN ASSISTANT

## 2021-06-09 PROCEDURE — 73130 X-RAY EXAM OF HAND: CPT

## 2021-06-09 PROCEDURE — 99283 EMERGENCY DEPT VISIT LOW MDM: CPT

## 2021-06-09 PROCEDURE — 90471 IMMUNIZATION ADMIN: CPT

## 2021-06-09 PROCEDURE — 90715 TDAP VACCINE 7 YRS/> IM: CPT | Performed by: PHYSICIAN ASSISTANT

## 2021-06-09 RX ORDER — GINSENG 100 MG
1 CAPSULE ORAL ONCE
Status: COMPLETED | OUTPATIENT
Start: 2021-06-09 | End: 2021-06-09

## 2021-06-09 RX ORDER — IBUPROFEN 600 MG/1
600 TABLET ORAL ONCE
Status: COMPLETED | OUTPATIENT
Start: 2021-06-09 | End: 2021-06-09

## 2021-06-09 RX ORDER — CLINDAMYCIN HYDROCHLORIDE 300 MG/1
300 CAPSULE ORAL EVERY 8 HOURS SCHEDULED
Qty: 21 CAPSULE | Refills: 0 | Status: SHIPPED | OUTPATIENT
Start: 2021-06-09 | End: 2021-06-09 | Stop reason: SDUPTHER

## 2021-06-09 RX ORDER — CLINDAMYCIN HYDROCHLORIDE 300 MG/1
300 CAPSULE ORAL EVERY 8 HOURS SCHEDULED
Qty: 21 CAPSULE | Refills: 0 | Status: SHIPPED | OUTPATIENT
Start: 2021-06-09 | End: 2021-06-16

## 2021-06-09 RX ORDER — LIDOCAINE HYDROCHLORIDE 20 MG/ML
5 INJECTION, SOLUTION EPIDURAL; INFILTRATION; INTRACAUDAL; PERINEURAL ONCE
Status: COMPLETED | OUTPATIENT
Start: 2021-06-09 | End: 2021-06-09

## 2021-06-09 RX ADMIN — IBUPROFEN 600 MG: 600 TABLET ORAL at 10:29

## 2021-06-09 RX ADMIN — BACITRACIN ZINC 1 SMALL APPLICATION: 500 OINTMENT TOPICAL at 10:30

## 2021-06-09 RX ADMIN — TETANUS TOXOID, REDUCED DIPHTHERIA TOXOID AND ACELLULAR PERTUSSIS VACCINE, ADSORBED 0.5 ML: 5; 2.5; 8; 8; 2.5 SUSPENSION INTRAMUSCULAR at 10:29

## 2021-06-09 RX ADMIN — LIDOCAINE HYDROCHLORIDE 5 ML: 20 INJECTION, SOLUTION EPIDURAL; INFILTRATION; INTRACAUDAL; PERINEURAL at 10:30

## 2021-06-09 NOTE — Clinical Note
Fito Jack was seen and treated in our emergency department on 6/9/2021  Diagnosis:     Candace Freedman  is off the rest of the shift today  He may return on this date: 06/14/2021         If you have any questions or concerns, please don't hesitate to call        Michelle Estes PA-C    ______________________________           _______________          _______________  Hospital Representative                              Date                                Time

## 2021-06-09 NOTE — ED PROVIDER NOTES
History  Chief Complaint   Patient presents with    Hand Laceration     c/o right palm laceration with possible glass FB s/p trying to put air conditioner in window and window broke approx 20 minutes PTA  Last Tetanus unknown     Luke Javier is a 46 y o  male who presents to the ED with complaints of wound of the right hand and FB sensation  Patient states at approximately 0900 today he was installing an air conditioner when he lacerated the palm of his hand on a broken window  Patient reports seeing pieces of glass in the wound  Denies numbness, tingling, weakness, erythema, edema, drainage, excessive bleeding, fever, chills  Patient is unsure of last TDaP  History provided by:  Patient  Laceration  Location:  Hand  Hand laceration location:  R palm  Length:  1 cm puncture wound  Time since incident:  1 hour  Laceration mechanism:  Broken glass  Pain details:     Quality:  Throbbing  Tetanus status:  Unknown  Associated symptoms: no fever, no focal weakness, no numbness, no rash, no redness, no swelling and no streaking        Prior to Admission Medications   Prescriptions Last Dose Informant Patient Reported? Taking?    Multiple Vitamin (MULTI-DAY VITAMINS) TABS  Self Yes No   Sig: Take by mouth   albuterol (PROVENTIL HFA,VENTOLIN HFA) 90 mcg/act inhaler   No No   Sig: Inhale 2 puffs every 4 (four) hours as needed for wheezing   amLODIPine (NORVASC) 5 mg tablet   No No   Sig: TAKE 1 TABLET BY MOUTH EVERY DAY   cholecalciferol (VITAMIN D3) 1,000 units tablet  Self Yes No   Sig: Take 1,000 Units by mouth daily   cyclobenzaprine (FLEXERIL) 10 mg tablet   No No   Sig: Take 1 tablet (10 mg total) by mouth daily at bedtime   fexofenadine (ALLEGRA) 30 MG tablet  Self Yes No   Sig: Take 30 mg by mouth daily   fluticasone (FLONASE) 50 mcg/act nasal spray  Self Yes No   Si spray into each nostril daily   lisinopril (ZESTRIL) 10 mg tablet   No No   Sig: TAKE 1 TABLET BY MOUTH EVERY DAY   methylPREDNISolone 4 MG tablet therapy pack   No No   Sig: Use as directed on package   montelukast (SINGULAIR) 10 mg tablet  Self No No   Sig: TAKE 1 TABLET (10 MG TOTAL) BY MOUTH DAILY AT BEDTIME   nabumetone (RELAFEN) 750 mg tablet   No No   Sig: Take 1 tablet (750 mg total) by mouth 2 (two) times a day      Facility-Administered Medications: None       Past Medical History:   Diagnosis Date    Acute bronchiolitis with bronchospasm     LAST ASSESSED: 11/25/16    Hypertension        Past Surgical History:   Procedure Laterality Date    TONSIL[facility]OMY  2013    [facility]       Family History   Problem Relation Age of Onset    Hypertension Mother     Thyroid nodules Mother     Ulcerative colitis Mother     Hypertension Father     Heart attack Paternal Grandfather 45    Heart failure Paternal Grandfather     Heart attack Paternal Uncle     Coronary artery disease Paternal Uncle         CABG    Coronary artery disease Paternal Aunt     Heart attack Paternal Aunt     Heart disease Maternal Grandfather         CARDIAC DISORDER    Heart disease Paternal Grandmother         CARDIAC DISORDER    Hypertension Paternal Grandmother     Kidney failure Paternal Grandmother         RENAL     I have reviewed and agree with the history as documented  E-Cigarette/Vaping    E-Cigarette Use Former User      E-Cigarette/Vaping Substances     Social History     Tobacco Use    Smoking status: Former Smoker     Packs/day: 0 50     Years: 17 00     Pack years: 8 50     Types: Cigarettes    Smokeless tobacco: Never Used    Tobacco comment:     Substance Use Topics    Alcohol use: Yes     Comment: DRINKS APPROX 2 TIMES PER MONTH    Drug use: No       Review of Systems   Constitutional: Negative for appetite change, chills, fever and unexpected weight change  HENT: Negative for congestion, drooling, ear pain, rhinorrhea, sore throat, trouble swallowing and voice change      Eyes: Negative for pain, discharge, redness and visual disturbance  Respiratory: Negative for cough, shortness of breath, wheezing and stridor  Cardiovascular: Negative for chest pain, palpitations and leg swelling  Gastrointestinal: Negative for abdominal pain, blood in stool, constipation, diarrhea, nausea and vomiting  Genitourinary: Negative for dysuria, flank pain, frequency, hematuria and urgency  Musculoskeletal: Negative for gait problem, joint swelling, neck pain and neck stiffness  Skin: Positive for wound  Negative for color change and rash  Neurological: Negative for dizziness, focal weakness, seizures, light-headedness and headaches  Physical Exam  Physical Exam  Vitals signs and nursing note reviewed  Constitutional:       Appearance: He is well-developed  HENT:      Head: Normocephalic and atraumatic  Nose: Nose normal    Eyes:      Conjunctiva/sclera: Conjunctivae normal       Pupils: Pupils are equal, round, and reactive to light  Neck:      Musculoskeletal: Normal range of motion and neck supple  Cardiovascular:      Rate and Rhythm: Normal rate and regular rhythm  Pulmonary:      Effort: Pulmonary effort is normal       Breath sounds: Normal breath sounds  Musculoskeletal: Normal range of motion  Right hand: He exhibits tenderness and laceration  He exhibits normal range of motion  Normal sensation noted  Normal strength noted  Hands:    Skin:     General: Skin is warm and dry  Capillary Refill: Capillary refill takes less than 2 seconds  Neurological:      Mental Status: He is alert and oriented to person, place, and time           Vital Signs  ED Triage Vitals [06/09/21 0935]   Temperature Pulse Respirations Blood Pressure SpO2   97 9 °F (36 6 °C) 99 18 168/88 99 %      Temp Source Heart Rate Source Patient Position - Orthostatic VS BP Location FiO2 (%)   Oral Monitor Sitting Left arm --      Pain Score       5           Vitals:    06/09/21 0935   BP: 168/88   Pulse: 99   Patient Position - Orthostatic VS: Sitting         Visual Acuity      ED Medications  Medications   lidocaine (PF) (XYLOCAINE-MPF) 2 % injection 5 mL (5 mL Infiltration Given by Other 6/9/21 1030)   bacitracin topical ointment 1 small application (1 small application Topical Given by Other 6/9/21 1030)   tetanus-diphtheria-acellular pertussis (BOOSTRIX) IM injection 0 5 mL (0 5 mL Intramuscular Given 6/9/21 1029)   ibuprofen (MOTRIN) tablet 600 mg (600 mg Oral Given 6/9/21 1029)       Diagnostic Studies  Results Reviewed     None                 XR hand 3+ views RIGHT    (Results Pending)              Procedures  Foreign Body - Embedded    Date/Time: 6/9/2021 10:57 AM  Performed by: Polo Mack PA-C  Authorized by: Kj Rodriguez DO     Patient location:  ED  Consent:     Consent obtained:  Verbal    Consent given by:  Patient    Risks discussed:  Bleeding, incomplete removal, nerve damage, infection, pain, worsening of condition and poor cosmetic result    Alternatives discussed:  No treatment, alternative treatment, observation, referral and delayed treatment  Universal protocol:     Patient identity confirmed:  Verbally with patient  Location:     Location:  Hand    Hand location:  R palm    Depth: Intradermal  Procedure details:     Localization method:  Visualized    Removal mechanism:  Irrigation    Removal Method:  Open    Foreign bodies recovered:  1    Description:  Large piece of glass    Intact foreign body removal: yes    Post-procedure details:     Neurovascular status: intact      Confirmation:  No additional foreign bodies on visualization    Dressing:  Antibiotic ointment and non-adherent dressing    Patient tolerance of procedure: Tolerated well, no immediate complications             ED Course  ED Course as of Jun 09 1108   Wed Jun 09, 2021   1100 Educated patient regarding diagnosis and management  Advised patient to follow up with PCP  Advised patient to RTER for persistent or worsening symptoms  MDM  Number of Diagnoses or Management Options  Laceration of right palm, initial encounter: new and requires workup  Diagnosis management comments: Patient tolerated wound exploration and irrigation - 1 large piece of intact glass was removed  Patient will be placed on prophylactic antibiotics  TDaP administered  I provided patient with strict RTER precautions  I advised patient follow-up with PCP in 24-48 hours  Patient verbalized understanding  Amount and/or Complexity of Data Reviewed  Tests in the radiology section of CPT®: reviewed and ordered  Review and summarize past medical records: yes    Patient Progress  Patient progress: improved      Disposition  Final diagnoses:   Laceration of right palm, initial encounter - W/ FB (Glass)     Time reflects when diagnosis was documented in both MDM as applicable and the Disposition within this note     Time User Action Codes Description Comment    6/9/2021 10:54 AM Gaye Fear Add [Q95 717N] Laceration of finger of right hand with foreign body     6/9/2021 10:59 AM Gaye Fear Remove [F75 979M] Laceration of finger of right hand with foreign body     6/9/2021 10:59 AM Gaye Fear Add [V00 298M] Laceration of right palm, initial encounter     6/9/2021 10:59 AM Gaye Fear Modify [Z65 355P] Laceration of right palm, initial encounter W/ FB Cris Lau)      ED Disposition     ED Disposition Condition Date/Time Comment    Discharge Stable Wed Jun 9, 2021 10:54 AM Sourav Branch discharge to home/self care              Follow-up Information     Follow up With Specialties Details Why Contact Info Additional [address] Emergency Department Emergency Medicine Go to  If symptoms worsen [address] Emergency Department, [address], [place], [place], [facility], DO Internal Medicine, Family Medicine Call   [address] TONY Will [address] [place] [address] [address]  [phone]             Discharge Medication List as of 6/9/2021 11:00 AM      START taking these medications    Details   clindamycin (CLEOCIN) 300 MG capsule Take 1 capsule (300 mg total) by mouth every 8 (eight) hours for 7 days, Starting Wed 6/9/2021, Until Wed 6/16/2021, Normal         CONTINUE these medications which have NOT CHANGED    Details   albuterol (PROVENTIL HFA,VENTOLIN HFA) 90 mcg/act inhaler Inhale 2 puffs every 4 (four) hours as needed for wheezing, Starting Fri 1/8/2021, Normal      amLODIPine (NORVASC) 5 mg tablet TAKE 1 TABLET BY MOUTH EVERY DAY, Normal      cholecalciferol (VITAMIN D3) 1,000 units tablet Take 1,000 Units by mouth daily, Historical Med      cyclobenzaprine (FLEXERIL) 10 mg tablet Take 1 tablet (10 mg total) by mouth daily at bedtime, Starting Fri 8/14/2020, Normal      fexofenadine (ALLEGRA) 30 MG tablet Take 30 mg by mouth daily, Historical Med      fluticasone (FLONASE) 50 mcg/act nasal spray 1 spray into each nostril daily, Historical Med      lisinopril (ZESTRIL) 10 mg tablet TAKE 1 TABLET BY MOUTH EVERY DAY, Normal      methylPREDNISolone 4 MG tablet therapy pack Use as directed on package, Normal      montelukast (SINGULAIR) 10 mg tablet TAKE 1 TABLET (10 MG TOTAL) BY MOUTH DAILY AT BEDTIME, Starting Mon 8/19/2019, Normal      Multiple Vitamin (MULTI-DAY VITAMINS) TABS Take by mouth, Historical Med      nabumetone (RELAFEN) 750 mg tablet Take 1 tablet (750 mg total) by mouth 2 (two) times a day, Starting Fri 8/14/2020, Normal           No discharge procedures on file      PDMP Review       Value Time User    PDMP Reviewed  Yes 1/10/2020  6:51 AM Kiya Tobias MD          ED Provider  Electronically Signed by           Sajan Medrano PA-C  06/09/21 [id number]

## 2021-06-09 NOTE — DISCHARGE INSTRUCTIONS
Laceration   WHAT YOU NEED TO KNOW:   A laceration is an injury to the skin and the soft tissue underneath it  Lacerations can happen anywhere on the body  DISCHARGE INSTRUCTIONS:   Return to the emergency department if:   · You have heavy bleeding or bleeding that does not stop after 10 minutes of holding firm, direct pressure over the wound  · Your wound opens up  Call your doctor if:   · You have a fever or chills  · Your laceration is red, warm, or swollen  · You have red streaks on your skin coming from your wound  · You have white or yellow drainage from the wound that smells bad  · You have pain that gets worse, even after treatment  · You have questions or concerns about your condition or care  Medicines: You may need any of the following:  · Prescription pain medicine  may be given  Ask your healthcare provider how to take this medicine safely  Some prescription pain medicines contain acetaminophen  Do not take other medicines that contain acetaminophen without talking to your healthcare provider  Too much acetaminophen may cause liver damage  Prescription pain medicine may cause constipation  Ask your healthcare provider how to prevent or treat constipation  · Antibiotics  help treat or prevent a bacterial infection  · Take your medicine as directed  Contact your healthcare provider if you think your medicine is not helping or if you have side effects  Tell him or her if you are allergic to any medicine  Keep a list of the medicines, vitamins, and herbs you take  Include the amounts, and when and why you take them  Bring the list or the pill bottles to follow-up visits  Carry your medicine list with you in case of an emergency  Care for your wound as directed:   · Do not get your wound wet  until your healthcare provider says it is okay  Do not soak your wound in water  Do not go swimming until your healthcare provider says it is okay   Carefully wash the wound with soap and water  Gently pat the area dry or allow it to air dry  · Change your bandages  when they get wet, dirty, or after washing  Apply new, clean bandages as directed  Do not apply elastic bandages or tape too tight  Do not put powders or lotions over your incision  · Apply antibiotic ointment as directed  Your healthcare provider may give you antibiotic ointment to put over your wound if you have stitches  If you have strips of tape over your incision, let them dry up and fall off on their own  If they do not fall off within 14 days, gently remove them  If you have glue over your wound, do not remove or pick at it  If your glue comes off, do not replace it with glue that you have at home  · Check your wound every day for signs of infection, such as swelling, redness, or pus  Self-care:   · Apply ice  on your wound for 15 to 20 minutes every hour or as directed  Use an ice pack, or put crushed ice in a plastic bag  Cover it with a towel  Ice helps prevent tissue damage and decreases swelling and pain  · Use a splint as directed  A splint will decrease movement and stress on your wound  It may help it heal faster  A splint may be used for lacerations over joints or areas of your body that bend  Ask your healthcare provider how to apply and remove a splint  · Decrease scarring of your wound  by applying ointments as directed  Do not apply ointments until your healthcare provider says it is okay  You may need to wait until your wound is healed  Ask which ointment to buy and how often to use it  After your wound is healed, use sunscreen over the area when you are out in the sun  You should do this for at least 6 months to 1 year after your injury  Follow up with your doctor as directed: You may need to follow up in 24 to 48 hours to have your wound checked for infection  You will need to return in 3 to 14 days if you have stitches or staples so they can be removed   Care for your wound as directed to prevent infection and help it heal  Write down your questions so you remember to ask them during your visits  © Copyright [address] Information is for End User's use only and may not be sold, redistributed or otherwise used for commercial purposes  All illustrations and images included in CareNotes® are the copyrighted property of A D A M , Inc  or [facility] Mio Dawson   The above information is an  only  It is not intended as medical advice for individual conditions or treatments  Talk to your doctor, nurse or pharmacist before following any medical regimen to see if it is safe and effective for you

## 2021-08-17 DIAGNOSIS — I10 ESSENTIAL HYPERTENSION: ICD-10-CM

## 2021-08-17 DIAGNOSIS — I10 ESSENTIAL HYPERTENSION: Primary | ICD-10-CM

## 2021-08-18 RX ORDER — LISINOPRIL 10 MG/1
TABLET ORAL
Qty: 30 TABLET | Refills: 0 | Status: SHIPPED | OUTPATIENT
Start: 2021-08-18 | End: 2021-10-19 | Stop reason: SDUPTHER

## 2021-08-18 RX ORDER — AMLODIPINE BESYLATE 5 MG/1
TABLET ORAL
Qty: 30 TABLET | Refills: 0 | Status: SHIPPED | OUTPATIENT
Start: 2021-08-18 | End: 2021-09-01

## 2021-09-01 DIAGNOSIS — I10 ESSENTIAL HYPERTENSION: ICD-10-CM

## 2021-09-01 RX ORDER — AMLODIPINE BESYLATE 5 MG/1
TABLET ORAL
Qty: 30 TABLET | Refills: 0 | Status: SHIPPED | OUTPATIENT
Start: 2021-09-01 | End: 2022-01-17

## 2021-10-13 DIAGNOSIS — Z20.822 EXPOSURE TO COVID-19 VIRUS: Primary | ICD-10-CM

## 2021-10-13 PROCEDURE — U0005 INFEC AGEN DETEC AMPLI PROBE: HCPCS | Performed by: INTERNAL MEDICINE

## 2021-10-13 PROCEDURE — U0003 INFECTIOUS AGENT DETECTION BY NUCLEIC ACID (DNA OR RNA); SEVERE ACUTE RESPIRATORY SYNDROME CORONAVIRUS 2 (SARS-COV-2) (CORONAVIRUS DISEASE [COVID-19]), AMPLIFIED PROBE TECHNIQUE, MAKING USE OF HIGH THROUGHPUT TECHNOLOGIES AS DESCRIBED BY CMS-2020-01-R: HCPCS | Performed by: INTERNAL MEDICINE

## 2021-10-14 ENCOUNTER — TELEMEDICINE (OUTPATIENT)
Dept: FAMILY MEDICINE CLINIC | Facility: CLINIC | Age: 41
End: 2021-10-14
Payer: COMMERCIAL

## 2021-10-14 VITALS — TEMPERATURE: 98.3 F | WEIGHT: 292 LBS | BODY MASS INDEX: 35.54 KG/M2

## 2021-10-14 DIAGNOSIS — U07.1 COVID-19: Primary | ICD-10-CM

## 2021-10-14 LAB — SARS-COV-2 RNA RESP QL NAA+PROBE: POSITIVE

## 2021-10-14 PROCEDURE — 99213 OFFICE O/P EST LOW 20 MIN: CPT | Performed by: FAMILY MEDICINE

## 2021-10-14 RX ORDER — ONDANSETRON 2 MG/ML
4 INJECTION INTRAMUSCULAR; INTRAVENOUS ONCE AS NEEDED
Status: CANCELLED | OUTPATIENT
Start: 2021-10-16

## 2021-10-14 RX ORDER — ALBUTEROL SULFATE 90 UG/1
3 AEROSOL, METERED RESPIRATORY (INHALATION) ONCE AS NEEDED
Status: CANCELLED | OUTPATIENT
Start: 2021-10-16

## 2021-10-14 RX ORDER — SODIUM CHLORIDE 9 MG/ML
20 INJECTION, SOLUTION INTRAVENOUS ONCE
Status: CANCELLED | OUTPATIENT
Start: 2021-10-16

## 2021-10-14 RX ORDER — ACETAMINOPHEN 325 MG/1
650 TABLET ORAL ONCE AS NEEDED
Status: CANCELLED | OUTPATIENT
Start: 2021-10-16

## 2021-10-15 ENCOUNTER — TELEPHONE (OUTPATIENT)
Dept: FAMILY MEDICINE CLINIC | Facility: CLINIC | Age: 41
End: 2021-10-15

## 2021-10-16 ENCOUNTER — HOSPITAL ENCOUNTER (OUTPATIENT)
Dept: INFUSION CENTER | Facility: HOSPITAL | Age: 41
Discharge: HOME | End: 2021-10-16
Payer: COMMERCIAL

## 2021-10-16 VITALS
SYSTOLIC BLOOD PRESSURE: 134 MMHG | OXYGEN SATURATION: 98 % | TEMPERATURE: 98.1 F | HEART RATE: 76 BPM | DIASTOLIC BLOOD PRESSURE: 74 MMHG | RESPIRATION RATE: 16 BRPM

## 2021-10-16 DIAGNOSIS — U07.1 COVID-19: Primary | ICD-10-CM

## 2021-10-16 PROCEDURE — M0245 HB BAMLAN AND ETESEV INF ADMIN: HCPCS | Performed by: FAMILY MEDICINE

## 2021-10-16 RX ORDER — ALBUTEROL SULFATE 90 UG/1
3 AEROSOL, METERED RESPIRATORY (INHALATION) ONCE AS NEEDED
Status: DISCONTINUED | OUTPATIENT
Start: 2021-10-16 | End: 2021-10-19 | Stop reason: HOSPADM

## 2021-10-16 RX ORDER — ACETAMINOPHEN 325 MG/1
650 TABLET ORAL ONCE AS NEEDED
Status: DISCONTINUED | OUTPATIENT
Start: 2021-10-16 | End: 2021-10-19 | Stop reason: HOSPADM

## 2021-10-16 RX ORDER — SODIUM CHLORIDE 9 MG/ML
20 INJECTION, SOLUTION INTRAVENOUS ONCE
Status: CANCELLED | OUTPATIENT
Start: 2021-10-16

## 2021-10-16 RX ORDER — ONDANSETRON 2 MG/ML
4 INJECTION INTRAMUSCULAR; INTRAVENOUS ONCE AS NEEDED
Status: CANCELLED | OUTPATIENT
Start: 2021-10-16

## 2021-10-16 RX ORDER — ONDANSETRON 2 MG/ML
4 INJECTION INTRAMUSCULAR; INTRAVENOUS ONCE AS NEEDED
Status: DISCONTINUED | OUTPATIENT
Start: 2021-10-16 | End: 2021-10-19 | Stop reason: HOSPADM

## 2021-10-16 RX ORDER — ACETAMINOPHEN 325 MG/1
650 TABLET ORAL ONCE AS NEEDED
Status: CANCELLED | OUTPATIENT
Start: 2021-10-16

## 2021-10-16 RX ORDER — ALBUTEROL SULFATE 90 UG/1
3 AEROSOL, METERED RESPIRATORY (INHALATION) ONCE AS NEEDED
Status: CANCELLED | OUTPATIENT
Start: 2021-10-16

## 2021-10-16 RX ORDER — SODIUM CHLORIDE 9 MG/ML
20 INJECTION, SOLUTION INTRAVENOUS ONCE
Status: COMPLETED | OUTPATIENT
Start: 2021-10-16 | End: 2021-10-16

## 2021-10-16 RX ADMIN — SODIUM CHLORIDE 20 ML/HR: 0.9 INJECTION, SOLUTION INTRAVENOUS at 10:23

## 2021-10-16 RX ADMIN — SODIUM CHLORIDE 2100 MG COMBINED: 9 INJECTION, SOLUTION INTRAVENOUS at 10:27

## 2021-10-19 ENCOUNTER — TELEMEDICINE (OUTPATIENT)
Dept: FAMILY MEDICINE CLINIC | Facility: CLINIC | Age: 41
End: 2021-10-19
Payer: COMMERCIAL

## 2021-10-19 VITALS
SYSTOLIC BLOOD PRESSURE: 120 MMHG | BODY MASS INDEX: 35.42 KG/M2 | WEIGHT: 291 LBS | DIASTOLIC BLOOD PRESSURE: 75 MMHG | TEMPERATURE: 97.9 F

## 2021-10-19 DIAGNOSIS — U07.1 COVID-19: Primary | ICD-10-CM

## 2021-10-19 DIAGNOSIS — I10 ESSENTIAL HYPERTENSION: ICD-10-CM

## 2021-10-19 PROCEDURE — 99213 OFFICE O/P EST LOW 20 MIN: CPT | Performed by: FAMILY MEDICINE

## 2021-10-19 RX ORDER — LISINOPRIL 10 MG/1
10 TABLET ORAL DAILY
Qty: 90 TABLET | Refills: 1 | Status: SHIPPED | OUTPATIENT
Start: 2021-10-19 | End: 2022-02-25 | Stop reason: SDUPTHER

## 2021-10-21 ENCOUNTER — TELEMEDICINE (OUTPATIENT)
Dept: FAMILY MEDICINE CLINIC | Facility: CLINIC | Age: 41
End: 2021-10-21
Payer: COMMERCIAL

## 2021-10-21 VITALS
SYSTOLIC BLOOD PRESSURE: 133 MMHG | DIASTOLIC BLOOD PRESSURE: 88 MMHG | TEMPERATURE: 97.7 F | WEIGHT: 292.2 LBS | BODY MASS INDEX: 35.58 KG/M2 | HEIGHT: 76 IN

## 2021-10-21 DIAGNOSIS — U07.1 COVID-19: Primary | ICD-10-CM

## 2021-10-21 PROCEDURE — 3008F BODY MASS INDEX DOCD: CPT | Performed by: FAMILY MEDICINE

## 2021-10-21 PROCEDURE — 1036F TOBACCO NON-USER: CPT | Performed by: FAMILY MEDICINE

## 2021-10-21 PROCEDURE — 99213 OFFICE O/P EST LOW 20 MIN: CPT | Performed by: FAMILY MEDICINE

## 2021-11-15 ENCOUNTER — OFFICE VISIT (OUTPATIENT)
Dept: INTERNAL MEDICINE CLINIC | Age: 41
End: 2021-11-15
Payer: COMMERCIAL

## 2021-11-15 VITALS
SYSTOLIC BLOOD PRESSURE: 138 MMHG | TEMPERATURE: 98.6 F | WEIGHT: 303.6 LBS | OXYGEN SATURATION: 98 % | BODY MASS INDEX: 36.97 KG/M2 | DIASTOLIC BLOOD PRESSURE: 78 MMHG | HEIGHT: 76 IN | HEART RATE: 92 BPM

## 2021-11-15 DIAGNOSIS — I10 ESSENTIAL HYPERTENSION: Primary | ICD-10-CM

## 2021-11-15 DIAGNOSIS — J31.0 CHRONIC RHINITIS: ICD-10-CM

## 2021-11-15 DIAGNOSIS — U07.1 COVID-19: ICD-10-CM

## 2021-11-15 DIAGNOSIS — K76.0 FATTY LIVER: ICD-10-CM

## 2021-11-15 DIAGNOSIS — D75.1 POLYCYTHEMIA: ICD-10-CM

## 2021-11-15 DIAGNOSIS — Z13.220 SCREENING, LIPID: ICD-10-CM

## 2021-11-15 DIAGNOSIS — Z12.11 SCREEN FOR COLON CANCER: ICD-10-CM

## 2021-11-15 DIAGNOSIS — M51.36 DEGENERATIVE LUMBAR DISC: ICD-10-CM

## 2021-11-15 PROCEDURE — 1036F TOBACCO NON-USER: CPT | Performed by: INTERNAL MEDICINE

## 2021-11-15 PROCEDURE — 99214 OFFICE O/P EST MOD 30 MIN: CPT | Performed by: INTERNAL MEDICINE

## 2021-11-15 PROCEDURE — 3008F BODY MASS INDEX DOCD: CPT | Performed by: INTERNAL MEDICINE

## 2021-11-15 PROCEDURE — 93000 ELECTROCARDIOGRAM COMPLETE: CPT | Performed by: INTERNAL MEDICINE

## 2021-11-15 PROCEDURE — 3725F SCREEN DEPRESSION PERFORMED: CPT | Performed by: INTERNAL MEDICINE

## 2021-12-14 ENCOUNTER — TELEPHONE (OUTPATIENT)
Dept: OTHER | Facility: OTHER | Age: 41
End: 2021-12-14

## 2022-01-15 DIAGNOSIS — I10 ESSENTIAL HYPERTENSION: ICD-10-CM

## 2022-01-17 RX ORDER — AMLODIPINE BESYLATE 5 MG/1
TABLET ORAL
Qty: 30 TABLET | Refills: 0 | Status: SHIPPED | OUTPATIENT
Start: 2022-01-17 | End: 2022-02-25 | Stop reason: SDUPTHER

## 2022-02-25 DIAGNOSIS — I10 ESSENTIAL HYPERTENSION: ICD-10-CM

## 2022-02-28 RX ORDER — LISINOPRIL 10 MG/1
10 TABLET ORAL DAILY
Qty: 30 TABLET | Refills: 1 | Status: SHIPPED | OUTPATIENT
Start: 2022-02-28 | End: 2022-03-24

## 2022-02-28 RX ORDER — AMLODIPINE BESYLATE 5 MG/1
5 TABLET ORAL DAILY
Qty: 30 TABLET | Refills: 1 | Status: SHIPPED | OUTPATIENT
Start: 2022-02-28 | End: 2022-03-24

## 2022-04-21 DIAGNOSIS — I10 ESSENTIAL HYPERTENSION: ICD-10-CM

## 2022-04-21 RX ORDER — LISINOPRIL 10 MG/1
TABLET ORAL
Qty: 30 TABLET | Refills: 0 | Status: SHIPPED | OUTPATIENT
Start: 2022-04-21 | End: 2022-05-18

## 2022-04-21 RX ORDER — AMLODIPINE BESYLATE 5 MG/1
5 TABLET ORAL DAILY
Qty: 30 TABLET | Refills: 0 | Status: SHIPPED | OUTPATIENT
Start: 2022-04-21 | End: 2022-04-26

## 2022-04-26 ENCOUNTER — OFFICE VISIT (OUTPATIENT)
Dept: INTERNAL MEDICINE CLINIC | Age: 42
End: 2022-04-26
Payer: COMMERCIAL

## 2022-04-26 VITALS
DIASTOLIC BLOOD PRESSURE: 64 MMHG | WEIGHT: 305 LBS | HEART RATE: 76 BPM | SYSTOLIC BLOOD PRESSURE: 120 MMHG | TEMPERATURE: 97.4 F | BODY MASS INDEX: 37.14 KG/M2 | OXYGEN SATURATION: 98 % | HEIGHT: 76 IN

## 2022-04-26 DIAGNOSIS — I10 ESSENTIAL HYPERTENSION: ICD-10-CM

## 2022-04-26 DIAGNOSIS — J30.2 SEASONAL ALLERGIES: ICD-10-CM

## 2022-04-26 DIAGNOSIS — J01.90 ACUTE NON-RECURRENT SINUSITIS, UNSPECIFIED LOCATION: Primary | ICD-10-CM

## 2022-04-26 PROBLEM — U07.1 COVID-19: Status: RESOLVED | Noted: 2021-10-14 | Resolved: 2022-04-26

## 2022-04-26 PROCEDURE — 99214 OFFICE O/P EST MOD 30 MIN: CPT | Performed by: INTERNAL MEDICINE

## 2022-04-26 PROCEDURE — 1036F TOBACCO NON-USER: CPT | Performed by: INTERNAL MEDICINE

## 2022-04-26 PROCEDURE — 3008F BODY MASS INDEX DOCD: CPT | Performed by: INTERNAL MEDICINE

## 2022-04-26 PROCEDURE — 3725F SCREEN DEPRESSION PERFORMED: CPT | Performed by: INTERNAL MEDICINE

## 2022-04-26 RX ORDER — MONTELUKAST SODIUM 10 MG/1
10 TABLET ORAL
Qty: 90 TABLET | Refills: 1 | Status: SHIPPED | OUTPATIENT
Start: 2022-04-26

## 2022-04-26 RX ORDER — AMOXICILLIN AND CLAVULANATE POTASSIUM 875; 125 MG/1; MG/1
1 TABLET, FILM COATED ORAL EVERY 12 HOURS SCHEDULED
Qty: 20 TABLET | Refills: 0 | Status: SHIPPED | OUTPATIENT
Start: 2022-04-26 | End: 2022-05-06

## 2022-04-26 RX ORDER — AMLODIPINE BESYLATE 5 MG/1
5 TABLET ORAL DAILY
Qty: 90 TABLET | Refills: 2 | Status: SHIPPED | OUTPATIENT
Start: 2022-04-26

## 2022-04-26 NOTE — PROGRESS NOTES
Assessment/Plan:    Essential hypertension  Patient's blood pressure remains under good control lisinopril and amlodipine and with out obvious signs or symptoms of obvious cardiovascular disease    BMI 36 0-36 9,adult  Remains obese and again was counseled on diet and exercise    Chronic rhinitis  Present he is only taking OTC antihistamines for his allergies I encouraged him during the bad seasons that he add back his Singulair and Flonase    Seasonal allergies  Present he is only using OTC antihistamines I told him to try adding Singulair and Flonase to when he increasing symptoms       Diagnoses and all orders for this visit:    Acute non-recurrent sinusitis, unspecified location  -     amoxicillin-clavulanate (AUGMENTIN) 875-125 mg per tablet; Take 1 tablet by mouth every 12 (twelve) hours for 10 days    Seasonal allergies  Comments:  trial of singulair  Orders:  -     montelukast (SINGULAIR) 10 mg tablet; Take 1 tablet (10 mg total) by mouth daily at bedtime    Essential hypertension    BMI 36 0-36 9,adult          Subjective:      Patient ID: Roberto Lazcano is a 38 y o  male  Sinusitis  This is a new problem  The current episode started in the past 7 days  The problem has been gradually worsening since onset  There has been no fever  His pain is at a severity of 2/10  The pain is mild  Associated symptoms include congestion, coughing, headaches, sinus pressure, a sore throat and swollen glands  Pertinent negatives include no chills, ear pain, neck pain or shortness of breath  Past treatments include sitting up and acetaminophen  The treatment provided mild relief  Review of Systems   Constitutional: Negative for chills, fatigue, fever and unexpected weight change  HENT: Positive for congestion, sinus pressure and sore throat  Negative for ear pain, hearing loss, postnasal drip, trouble swallowing and voice change  Eyes: Negative for visual disturbance  Respiratory: Positive for cough  Negative for chest tightness, shortness of breath and wheezing  Cardiovascular: Negative for chest pain, palpitations and leg swelling  Gastrointestinal: Negative for abdominal distention, abdominal pain, anal bleeding, blood in stool, constipation, diarrhea and nausea  Endocrine: Negative for cold intolerance, polydipsia, polyphagia and polyuria  Genitourinary: Negative for dysuria, flank pain, frequency, hematuria and urgency  Musculoskeletal: Negative for arthralgias, back pain, gait problem, joint swelling, myalgias and neck pain  Skin: Negative for rash  Allergic/Immunologic: Negative for immunocompromised state  Neurological: Positive for headaches  Negative for dizziness, syncope, facial asymmetry, weakness, light-headedness and numbness  Hematological: Negative for adenopathy  Psychiatric/Behavioral: Negative for confusion, sleep disturbance and suicidal ideas  Objective:      /64 (BP Location: Left arm, Patient Position: Sitting, Cuff Size: Standard)   Pulse 76   Temp (!) 97 4 °F (36 3 °C)   Ht 6' 4" (1 93 m)   Wt (!) 138 kg (305 lb)   SpO2 98%   BMI 37 13 kg/m²          Physical Exam  Constitutional:       General: He is not in acute distress  Appearance: He is well-developed  He is ill-appearing  HENT:      Right Ear: Tympanic membrane and external ear normal       Left Ear: Tympanic membrane and external ear normal       Nose: Congestion present  Comments: Purulent nasal discharge     Mouth/Throat:      Pharynx: No oropharyngeal exudate  Comments: Post nasal drip  Eyes:      Extraocular Movements: Extraocular movements intact  Conjunctiva/sclera: Conjunctivae normal       Pupils: Pupils are equal, round, and reactive to light  Neck:      Thyroid: No thyromegaly  Vascular: No JVD  Cardiovascular:      Rate and Rhythm: Normal rate and regular rhythm  Heart sounds: Normal heart sounds  No murmur heard  No gallop      Pulmonary: Effort: Pulmonary effort is normal  No respiratory distress  Breath sounds: Normal breath sounds  No wheezing or rales  Abdominal:      General: Bowel sounds are normal  There is no distension  Palpations: Abdomen is soft  There is no mass  Tenderness: There is no abdominal tenderness  Musculoskeletal:         General: No tenderness  Normal range of motion  Cervical back: Normal range of motion and neck supple  Lymphadenopathy:      Cervical: No cervical adenopathy  Skin:     Findings: No rash  Neurological:      Mental Status: He is alert and oriented to person, place, and time  Cranial Nerves: No cranial nerve deficit  Coordination: Coordination normal    Psychiatric:         Behavior: Behavior normal          Thought Content:  Thought content normal          Judgment: Judgment normal

## 2022-04-26 NOTE — ASSESSMENT & PLAN NOTE
Patient's blood pressure remains under good control lisinopril and amlodipine and with out obvious signs or symptoms of obvious cardiovascular disease

## 2022-04-26 NOTE — PATIENT INSTRUCTIONS
Sinusitis   AMBULATORY CARE:   Sinusitis  is inflammation or infection of your sinuses  Sinusitis is most often caused by a virus  Acute sinusitis may last up to 12 weeks  Chronic sinusitis lasts longer than 12 weeks  Recurrent sinusitis means you have 4 or more infections in 1 year  Common signs and symptoms:   · Fever    · Pain, pressure, redness, or swelling around the forehead, cheeks, or eyes    · Thick yellow or green discharge from your nose    · Tenderness when you touch your face over your sinuses    · Dry cough that happens mostly at night or when you lie down    · Headache and face pain that is worse when you lean forward    · Tooth pain, or pain when you chew    Seek care immediately if:   · You have trouble breathing or wheezing that is getting worse  · You have a stiff neck, a fever, or a bad headache  · You cannot open your eye  · Your eyeball bulges out or you cannot move your eye  · You are more sleepy than normal, or you notice changes in your ability to think, move, or talk  · You have swelling of your forehead or scalp  Call your doctor if:   · You have vision changes, such as double vision  · Your eye and eyelid are red, swollen, and painful  · Your symptoms do not improve or go away after 10 days  · You have nausea and are vomiting  · Your nose is bleeding  · You have questions or concerns about your condition or care  Medicines: Your symptoms may go away on their own  Your healthcare provider may recommend watchful waiting for up to 10 days before starting antibiotics  You may need any of the following:  · Acetaminophen  decreases pain and fever  It is available without a doctor's order  Ask how much to take and how often to take it  Follow directions  Read the labels of all other medicines you are using to see if they also contain acetaminophen, or ask your doctor or pharmacist  Acetaminophen can cause liver damage if not taken correctly   Do not use more than 4 grams (4,000 milligrams) total of acetaminophen in one day  · NSAIDs , such as ibuprofen, help decrease swelling, pain, and fever  This medicine is available with or without a doctor's order  NSAIDs can cause stomach bleeding or kidney problems in certain people  If you take blood thinner medicine, always ask your healthcare provider if NSAIDs are safe for you  Always read the medicine label and follow directions  · Nasal steroid sprays  may help decrease inflammation in your nose and sinuses  · Decongestants  help reduce swelling and drain mucus in the nose and sinuses  They may help you breathe easier  · Antihistamines  help dry mucus in the nose and relieve sneezing  · Antibiotics  help treat or prevent a bacterial infection  Self-care:   · Rinse your sinuses as directed  Use a sinus rinse device to rinse your nasal passages with a saline (salt water) solution or distilled water  Do not use tap water  This will help thin the mucus in your nose and rinse away pollen and dirt  It will also help reduce swelling so you can breathe normally  · Use a humidifier  to increase air moisture in your home  This may make it easier for you to breathe and help decrease your cough  · Sleep with your head elevated  Place an extra pillow under your head before you go to sleep to help your sinuses drain  · Drink liquids as directed  Ask your healthcare provider how much liquid to drink each day and which liquids are best for you  Liquids will thin the mucus in your nose and help it drain  Avoid drinks that contain alcohol or caffeine  · Do not smoke, and avoid secondhand smoke  Nicotine and other chemicals in cigarettes and cigars can make your symptoms worse  Ask your healthcare provider for information if you currently smoke and need help to quit  E-cigarettes or smokeless tobacco still contain nicotine   Talk to your healthcare provider before you use these products  Prevent the spread of germs:   · Wash your hands often with soap and water  Wash your hands after you use the bathroom, change a child's diaper, or sneeze  Wash your hands before you prepare or eat food  · Stay away from people who are sick  Some germs spread easily and quickly through contact  Follow up with your doctor as directed: You may be referred to an ear, nose, and throat specialist  Write down your questions so you remember to ask them during your visits  © Copyright [address] 2022 Information is for End User's use only and may not be sold, redistributed or otherwise used for commercial purposes  All illustrations and images included in CareNotes® are the copyrighted property of A D A M , Inc  or [facility] Mio Echols  The above information is an  only  It is not intended as medical advice for individual conditions or treatments  Talk to your doctor, nurse or pharmacist before following any medical regimen to see if it is safe and effective for you

## 2022-04-26 NOTE — ASSESSMENT & PLAN NOTE
Present he is only using OTC antihistamines I told him to try adding Singulair and Flonase to when he increasing symptoms

## 2022-04-26 NOTE — ASSESSMENT & PLAN NOTE
Present he is only taking OTC antihistamines for his allergies I encouraged him during the bad seasons that he add back his Singulair and Flonase

## 2022-05-18 DIAGNOSIS — I10 ESSENTIAL HYPERTENSION: ICD-10-CM

## 2022-05-18 RX ORDER — LISINOPRIL 10 MG/1
TABLET ORAL
Qty: 30 TABLET | Refills: 1 | Status: SHIPPED | OUTPATIENT
Start: 2022-05-18 | End: 2022-06-01

## 2022-06-01 DIAGNOSIS — I10 ESSENTIAL HYPERTENSION: ICD-10-CM

## 2022-06-01 RX ORDER — LISINOPRIL 10 MG/1
TABLET ORAL
Qty: 90 TABLET | Refills: 1 | Status: SHIPPED | OUTPATIENT
Start: 2022-06-01

## 2022-06-16 ENCOUNTER — OFFICE VISIT (OUTPATIENT)
Dept: INTERNAL MEDICINE CLINIC | Age: 42
End: 2022-06-16
Payer: COMMERCIAL

## 2022-06-16 VITALS
WEIGHT: 302 LBS | TEMPERATURE: 98.5 F | BODY MASS INDEX: 36.77 KG/M2 | DIASTOLIC BLOOD PRESSURE: 68 MMHG | HEART RATE: 80 BPM | SYSTOLIC BLOOD PRESSURE: 120 MMHG | HEIGHT: 76 IN | OXYGEN SATURATION: 98 %

## 2022-06-16 DIAGNOSIS — Z00.00 ANNUAL PHYSICAL EXAM: Primary | ICD-10-CM

## 2022-06-16 PROBLEM — J01.90 ACUTE NON-RECURRENT SINUSITIS: Status: RESOLVED | Noted: 2022-04-26 | Resolved: 2022-06-16

## 2022-06-16 PROCEDURE — 99396 PREV VISIT EST AGE 40-64: CPT | Performed by: INTERNAL MEDICINE

## 2022-06-16 PROCEDURE — 1036F TOBACCO NON-USER: CPT | Performed by: INTERNAL MEDICINE

## 2022-06-16 PROCEDURE — 3008F BODY MASS INDEX DOCD: CPT | Performed by: INTERNAL MEDICINE

## 2022-06-16 NOTE — PROGRESS NOTES
ADULT ANNUAL [place] [address] [facility]    NAME: Isabel Singh  AGE: 38 y o  SEX: male  : 1980     DATE: 2022     Assessment and Plan:     Problem List Items Addressed This Visit        Other    BMI 36 0-36 9,adult     He continues to struggle with his weight and again was counseled on diet and exercise           Annual physical exam - Primary          Immunizations and preventive care screenings were discussed with patient today  Appropriate education was printed on patient's after visit summary  Counseling:  · Exercise: the importance of regular exercise/physical activity was discussed  Recommend exercise 3-5 times per week for at least 30 minutes  BMI Counseling: Body mass index is 36 76 kg/m²  The BMI is above normal  Nutrition recommendations include decreasing portion sizes, encouraging healthy choices of fruits and vegetables, decreasing fast food intake, limiting drinks that contain sugar and moderation in carbohydrate intake  Exercise recommendations include vigorous physical activity 75 minutes/week and strength training exercises  Rationale for BMI follow-up plan is due to patient being overweight or obese  No follow-ups on file  Chief Complaint:     Chief Complaint   Patient presents with    Annual Exam     Annual well exam      History of Present Illness:     Adult Annual Physical   Patient here for a comprehensive physical exam  The patient reports problems - obesity  Diet and Physical Activity  · Diet/Nutrition: poor diet  · Exercise: no formal exercise  Depression Screening  PHQ-2/9 Depression Screening         General Health  · Sleep: sleeps well  · Hearing: decreased - bilateral   · Vision: goes for regular eye exams  · Dental: no dental visits for >1 year          Health  · Symptoms include: none     Review of Systems:     Review of Systems   Constitutional: Negative for chills, fatigue, fever and unexpected weight change  HENT: Negative for congestion, ear pain, hearing loss, postnasal drip, sinus pressure, sore throat, trouble swallowing and voice change  Eyes: Negative for visual disturbance  Respiratory: Negative for cough, chest tightness, shortness of breath and wheezing  Cardiovascular: Negative for chest pain, palpitations and leg swelling  Gastrointestinal: Negative for abdominal distention, abdominal pain, anal bleeding, blood in stool, constipation, diarrhea and nausea  Endocrine: Negative for cold intolerance, polydipsia, polyphagia and polyuria  Genitourinary: Negative for dysuria, flank pain, frequency, hematuria and urgency  Musculoskeletal: Negative for arthralgias, back pain, gait problem, joint swelling, myalgias and neck pain  Skin: Negative for rash  Allergic/Immunologic: Negative for immunocompromised state  Neurological: Negative for dizziness, syncope, facial asymmetry, weakness, light-headedness, numbness and headaches  Hematological: Negative for adenopathy  Psychiatric/Behavioral: Negative for confusion, sleep disturbance and suicidal ideas        Past Medical History:     Past Medical History:   Diagnosis Date    Acute bronchiolitis with bronchospasm     LAST ASSESSED: 11/25/16    COVID-19 10/14/2021    Hypertension       Past Surgical History:     Past Surgical History:   Procedure Laterality Date    [facility]  2013    [facility]      Family History:     Family History   Problem Relation Age of Onset    Hypertension Mother     Thyroid nodules Mother     Ulcerative colitis Mother     Hypertension Father     Heart attack Paternal Grandfather 45    Heart failure Paternal Grandfather     Heart attack Paternal Uncle     Coronary artery disease Paternal Uncle         CABG    Coronary artery disease Paternal Aunt     Heart attack Paternal Aunt     Heart disease Maternal Grandfather         CARDIAC DISORDER    Heart disease Paternal Grandmother         CARDIAC DISORDER    Hypertension Paternal Grandmother     Kidney failure Paternal Grandmother         RENAL      Social History:     Social History     Socioeconomic History    Marital status: Single     Spouse name: None    Number of children: None    Years of education: None    Highest education level: None   Occupational History    Occupation:    Tobacco Use    Smoking status: Former Smoker     Packs/day: 0 50     Years: 17 00     Pack years: 8 50     Types: Cigarettes     Quit date: 2020     Years since quittin 4    Smokeless tobacco: Never Used    Tobacco comment:     Vaping Use    Vaping Use: Former   Substance and Sexual Activity    Alcohol use:  Yes     Alcohol/week: 1 0 standard drink     Types: 1 Cans of beer per week     Comment: DRINKS APPROX 2 TIMES PER MONTH    Drug use: No    Sexual activity: None   Other Topics Concern    None   Social History Narrative    CAFFEINE USE: HE ADMITS TO CONSUMING CAFFEINE VIA COFFEE (1 SERVING PER DAY)    COMPLETED HIGH SCHOOL     Social Determinants of Health     Financial Resource Strain: Not on file   Food Insecurity: Not on file   Transportation Needs: Not on file   Physical Activity: Not on file   Stress: Not on file   Social Connections: Not on file   Intimate Partner Violence: Not on file   Housing Stability: Not on file      Current Medications:     Current Outpatient Medications   Medication Sig Dispense Refill    lisinopril (ZESTRIL) 10 mg tablet TAKE 1 TABLET BY MOUTH EVERY DAY 90 tablet 1    albuterol (PROVENTIL HFA,VENTOLIN HFA) 90 mcg/act inhaler Inhale 2 puffs every 4 (four) hours as needed for wheezing 6 7 Inhaler 0    amLODIPine (NORVASC) 5 mg tablet Take 1 tablet (5 mg total) by mouth daily 90 tablet 2    cholecalciferol (VITAMIN D3) 1,000 units tablet Take 1,000 Units by mouth daily      cyclobenzaprine (FLEXERIL) 10 mg tablet Take 1 tablet (10 mg total) by mouth daily at bedtime 15 tablet 1    fexofenadine (ALLEGRA) 30 MG tablet Take 30 mg by mouth daily      fluticasone (FLONASE) 50 mcg/act nasal spray 1 spray into each nostril daily      montelukast (SINGULAIR) 10 mg tablet Take 1 tablet (10 mg total) by mouth daily at bedtime 90 tablet 1    Multiple Vitamin (MULTI-DAY VITAMINS) TABS Take by mouth       No current facility-administered medications for this visit  Allergies: Allergies   Allergen Reactions    Cefaclor Hives      Physical Exam:     /68 (BP Location: Left arm, Patient Position: Sitting)   Pulse 80   Temp 98 5 °F (36 9 °C) (Tympanic)   Ht 6' 4" (1 93 m)   Wt (!) 137 kg (302 lb)   SpO2 98%   BMI 36 76 kg/m²     Physical Exam  Vitals and nursing note reviewed  Constitutional:       Appearance: He is well-developed  He is obese  HENT:      Head: Normocephalic and atraumatic  Eyes:      Conjunctiva/sclera: Conjunctivae normal    Cardiovascular:      Rate and Rhythm: Normal rate and regular rhythm  Heart sounds: No murmur heard  Pulmonary:      Effort: Pulmonary effort is normal  No respiratory distress  Breath sounds: Normal breath sounds  Abdominal:      Palpations: Abdomen is soft  Tenderness: There is no abdominal tenderness  Musculoskeletal:      Cervical back: Neck supple  Skin:     General: Skin is warm and dry  Neurological:      Mental Status: He is alert  Clara Triana MD  [facility]  BMI Counseling: Body mass index is 36 76 kg/m²  The BMI is above normal  Nutrition recommendations include decreasing overall calorie intake

## 2022-06-16 NOTE — PATIENT INSTRUCTIONS
Wellness Visit for Adults   AMBULATORY CARE:   A wellness visit  is when you see your healthcare provider to get screened for health problems  Your healthcare provider will also give you advice on how to stay healthy  Write down your questions so you remember to ask them  Ask your healthcare provider how often you should have a wellness visit  What happens at a wellness visit:  Your healthcare provider will ask about your health, and your family history of health problems  This includes high blood pressure, heart disease, and cancer  He or she will ask if you have symptoms that concern you, if you smoke, and about your mood  You may also be asked about your intake of medicines, supplements, food, and alcohol  Any of the following may be done:  · Your weight  will be checked  Your height may also be checked so your body mass index (BMI) can be calculated  Your BMI shows if you are at a healthy weight  · Your blood pressure  and heart rate will be checked  Your temperature may also be checked  · Blood and urine tests  may be done  Blood tests may be done to check your cholesterol levels  Abnormal cholesterol levels increase your risk for heart disease and stroke  You may also need a blood or urine test to check for diabetes if you are at increased risk  Urine tests may be done to look for signs of an infection or kidney disease  · A physical exam  includes checking your heartbeat and lungs with a stethoscope  Your healthcare provider may also check your skin to look for sun damage  · Screening tests  may be recommended  A screening test is done to check for diseases that may not cause symptoms  The screening tests you may need depend on your age, gender, family history, and lifestyle habits  For example, colorectal screening may be recommended if you are 48years old or older  Screening tests you need if you are a woman:   · A Pap smear  is used to screen for cervical cancer   Pap smears are usually done every 3 to 5 years depending on your age  You may need them more often if you have had abnormal Pap smear test results in the past  Ask your healthcare provider how often you should have a Pap smear  · A mammogram  is an x-ray of your breasts to screen for breast cancer  Experts recommend mammograms every 2 years starting at age 48 years  You may need a mammogram at age 52 years or younger if you have an increased risk for breast cancer  Talk to your healthcare provider about when you should start having mammograms and how often you need them  Vaccines you may need:   · Get an influenza vaccine  every year  The influenza vaccine protects you from the flu  Several types of viruses cause the flu  The viruses change over time, so new vaccines are made each year  · Get a tetanus-diphtheria (Td) booster vaccine  every 10 years  This vaccine protects you against tetanus and diphtheria  Tetanus is a severe infection that may cause painful muscle spasms and lockjaw  Diphtheria is a severe bacterial infection that causes a thick covering in the back of your mouth and throat  · Get a human papillomavirus (HPV) vaccine  if you are female and aged 23 to 32 or male 23 to 24 and never received it  This vaccine protects you from HPV infection  HPV is the most common infection spread by sexual contact  HPV may also cause vaginal, penile, and anal cancers  · Get a pneumococcal vaccine  if you are aged 72 years or older  The pneumococcal vaccine is an injection given to protect you from pneumococcal disease  Pneumococcal disease is an infection caused by pneumococcal bacteria  The infection may cause pneumonia, meningitis, or an ear infection  · Get a shingles vaccine  if you are 60 or older, even if you have had shingles before  The shingles vaccine is an injection to protect you from the varicella-zoster virus  This is the same virus that causes chickenpox   Shingles is a painful rash that develops in people who had chickenpox or have been exposed to the virus  How to eat healthy:  My Plate is a model for planning healthy meals  It shows the types and amounts of foods that should go on your plate  Fruits and vegetables make up about half of your plate, and grains and protein make up the other half  A serving of dairy is included on the side of your plate  The amount of calories and serving sizes you need depends on your age, gender, weight, and height  Examples of healthy foods are listed below:  · Eat a variety of vegetables  such as dark green, red, and orange vegetables  You can also include canned vegetables low in sodium (salt) and frozen vegetables without added butter or sauces  · Eat a variety of fresh fruits , canned fruit in 100% juice, frozen fruit, and dried fruit  · Include whole grains  At least half of the grains you eat should be whole grains  Examples include whole-wheat bread, wheat pasta, brown rice, and whole-grain cereals such as oatmeal     · Eat a variety of protein foods such as seafood (fish and shellfish), lean meat, and poultry without skin (turkey and chicken)  Examples of lean meats include pork leg, shoulder, or tenderloin, and beef round, sirloin, tenderloin, and extra lean ground beef  Other protein foods include eggs and egg substitutes, beans, peas, soy products, nuts, and seeds  · Choose low-fat dairy products such as skim or 1% milk or low-fat yogurt, cheese, and cottage cheese  · Limit unhealthy fats  such as butter, hard margarine, and shortening  Exercise:  Exercise at least 30 minutes per day on most days of the week  Some examples of exercise include walking, biking, dancing, and swimming  You can also fit in more physical activity by taking the stairs instead of the elevator or parking farther away from stores  Include muscle strengthening activities 2 days each week  Regular exercise provides many health benefits   It helps you manage your weight, and decreases your risk for type 2 diabetes, heart disease, stroke, and high blood pressure  Exercise can also help improve your mood  Ask your healthcare provider about the best exercise plan for you  General health and safety guidelines:   · Do not smoke  Nicotine and other chemicals in cigarettes and cigars can cause lung damage  Ask your healthcare provider for information if you currently smoke and need help to quit  E-cigarettes or smokeless tobacco still contain nicotine  Talk to your healthcare provider before you use these products  · Limit alcohol  A drink of alcohol is 12 ounces of beer, 5 ounces of wine, or 1½ ounces of liquor  · Lose weight, if needed  Being overweight increases your risk of certain health conditions  These include heart disease, high blood pressure, type 2 diabetes, and certain types of cancer  · Protect your skin  Do not sunbathe or use tanning beds  Use sunscreen with a SPF 15 or higher  Apply sunscreen at least 15 minutes before you go outside  Reapply sunscreen every 2 hours  Wear protective clothing, hats, and sunglasses when you are outside  · Drive safely  Always wear your seatbelt  Make sure everyone in your car wears a seatbelt  A seatbelt can save your life if you are in an accident  Do not use your cell phone when you are driving  This could distract you and cause an accident  Pull over if you need to make a call or send a text message  · Practice safe sex  Use latex condoms if are sexually active and have more than one partner  Your healthcare provider may recommend screening tests for sexually transmitted infections (STIs)  · Wear helmets, lifejackets, and protective gear  Always wear a helmet when you ride a bike or motorcycle, go skiing, or play sports that could cause a head injury  Wear protective equipment when you play sports  Wear a lifejacket when you are on a boat or doing water sports      © Copyright [employer] 2022 Information is for End User's use only and may not be sold, redistributed or otherwise used for commercial purposes  All illustrations and images included in CareNotes® are the copyrighted property of A D A M , Inc  or Kota Ech[employer]  The above information is an  only  It is not intended as medical advice for individual conditions or treatments  Talk to your doctor, nurse or pharmacist before following any medical regimen to see if it is safe and effective for you  Obesity   AMBULATORY CARE:   Obesity  means your body mass index (BMI) is greater than 30  Your healthcare provider will use your height and weight to measure your BMI  The risks of obesity include  many health problems, including injuries or physical disability  · Diabetes (high blood sugar level)    · High blood pressure or high cholesterol    · Heart disease    · Stroke    · Gallbladder or liver disease    · Cancer of the colon, breast, prostate, liver, or kidney    · Sleep apnea    · Arthritis or gout    Screening  is done to check for health conditions before you have signs or symptoms  If you are 28to 79years old, your blood sugar level may be checked every 3 years for signs of prediabetes or diabetes  Your healthcare provider will check your blood pressure at each visit  High blood pressure can lead to a stroke or other problems  Your provider may check for signs of heart disease, cancer, or other health problems  Seek care immediately if:   · You have a severe headache, confusion, or difficulty speaking  · You have weakness on one side of your body  · You have chest pain, sweating, or shortness of breath  Call your doctor if:   · You have symptoms of gallbladder or liver disease, such as pain in your upper abdomen  · You have knee or hip pain and discomfort while walking  · You have symptoms of diabetes, such as intense hunger and thirst, and frequent urination      · You have symptoms of sleep apnea, such as snoring or daytime sleepiness  · You have questions or concerns about your condition or care  Treatment for obesity  focuses on helping you lose weight to improve your health  Even a small decrease in BMI can reduce the risk for many health problems  Your healthcare provider will help you set a weight-loss goal   · Lifestyle changes  are the first step in treating obesity  These include making healthy food choices and getting regular physical activity  Your healthcare provider may suggest a weight-loss program that involves coaching, education, and therapy  · Medicine  may help you lose weight when it is used with a healthy foods and physical activity  · Surgery  can help you lose weight if you are very obese and have other health problems  There are several types of weight-loss surgery  Ask your healthcare provider for more information  Tips for safe weight loss:   · Set small, realistic goals  An example of a small goal is to walk for 20 minutes 5 days a week  Anther goal is to lose 5% of your body weight  · Tell friends, family members, and coworkers about your goals  and ask for their support  Ask a friend to lose weight with you, or join a weight-loss support group  · Identify foods or triggers that may cause you to overeat , and find ways to avoid them  Remove tempting high-calorie foods from your home and workplace  Place a bowl of fresh fruit on your kitchen counter  If stress causes you to eat, then find other ways to cope with stress  A counselor or therapist may be able to help you  · Keep a diary to track what you eat and drink  Also write down how many minutes of physical activity you do each day  Weigh yourself once a week and record it in your diary  Eating changes: You will need to eat 500 to 1,000 fewer calories each day than you currently eat to lose 1 to 2 pounds a week  The following changes will help you cut calories:  · Eat smaller portions    Use small plates, no larger than 9 inches in diameter  Fill your plate half full of fruits and vegetables  Measure your food using measuring cups until you know what a serving size looks like  · Eat 3 meals and 1 or 2 snacks each day  Plan your meals in advance  [employer] and eat at home most of the time  Eat slowly  Do not skip meals  Skipping meals can lead to overeating later in the day  This can make it harder for you to lose weight  Talk with a dietitian to help you make a meal plan and schedule that is right for you  · Eat fruits and vegetables at every meal   They are low in calories and high in fiber, which makes you feel full  Do not add butter, margarine, or cream sauce to vegetables  Use herbs to season steamed vegetables  · Eat less fat and fewer fried foods  Eat more baked or grilled chicken and fish  These protein sources are lower in calories and fat than red meat  Limit fast food  Dress your salads with olive oil and vinegar instead of bottled dressing  · Limit the amount of sugar you eat  Do not drink sugary beverages  Limit alcohol  Activity changes:  Physical activity is good for your body in many ways  It helps you burn calories and build strong muscles  It decreases stress and depression, and improves your mood  It can also help you sleep better  Talk to your healthcare provider before you begin an exercise program   · Exercise for at least 30 minutes 5 days a week  Start slowly  Set aside time each day for physical activity that you enjoy and that is convenient for you  It is best to do both weight training and an activity that increases your heart rate, such as walking, bicycling, or swimming  · Find ways to be more active  Do yard work and housecleaning  Walk up the stairs instead of using elevators  Spend your leisure time going to events that require walking, such as outdoor festivals or fairs  This extra physical activity can help you lose weight and keep it off         Follow up with your doctor as directed: You may need to meet with a dietitian  Write down your questions so you remember to ask them during your visits  © Copyright [employer] 2022 Information is for End User's use only and may not be sold, redistributed or otherwise used for commercial purposes  All illustrations and images included in CareNotes® are the copyrighted property of A D A M , Inc  or Kota Dawson   The above information is an  only  It is not intended as medical advice for individual conditions or treatments  Talk to your doctor, nurse or pharmacist before following any medical regimen to see if it is safe and effective for you  Obesity   AMBULATORY CARE:   Obesity  means your body mass index (BMI) is greater than 30  Your healthcare provider will use your height and weight to measure your BMI  The risks of obesity include  many health problems, including injuries or physical disability  · Diabetes (high blood sugar level)    · High blood pressure or high cholesterol    · Heart disease    · Stroke    · Gallbladder or liver disease    · Cancer of the colon, breast, prostate, liver, or kidney    · Sleep apnea    · Arthritis or gout    Screening  is done to check for health conditions before you have signs or symptoms  If you are 28to 79years old, your blood sugar level may be checked every 3 years for signs of prediabetes or diabetes  Your healthcare provider will check your blood pressure at each visit  High blood pressure can lead to a stroke or other problems  Your provider may check for signs of heart disease, cancer, or other health problems  Seek care immediately if:   · You have a severe headache, confusion, or difficulty speaking  · You have weakness on one side of your body  · You have chest pain, sweating, or shortness of breath  Call your doctor if:   · You have symptoms of gallbladder or liver disease, such as pain in your upper abdomen      · You have knee or hip pain and discomfort while walking  · You have symptoms of diabetes, such as intense hunger and thirst, and frequent urination  · You have symptoms of sleep apnea, such as snoring or daytime sleepiness  · You have questions or concerns about your condition or care  Treatment for obesity  focuses on helping you lose weight to improve your health  Even a small decrease in BMI can reduce the risk for many health problems  Your healthcare provider will help you set a weight-loss goal   · Lifestyle changes  are the first step in treating obesity  These include making healthy food choices and getting regular physical activity  Your healthcare provider may suggest a weight-loss program that involves coaching, education, and therapy  · Medicine  may help you lose weight when it is used with a healthy foods and physical activity  · Surgery  can help you lose weight if you are very obese and have other health problems  There are several types of weight-loss surgery  Ask your healthcare provider for more information  Tips for safe weight loss:   · Set small, realistic goals  An example of a small goal is to walk for 20 minutes 5 days a week  Anther goal is to lose 5% of your body weight  · Tell friends, family members, and coworkers about your goals  and ask for their support  Ask a friend to lose weight with you, or join a weight-loss support group  · Identify foods or triggers that may cause you to overeat , and find ways to avoid them  Remove tempting high-calorie foods from your home and workplace  Place a bowl of fresh fruit on your kitchen counter  If stress causes you to eat, then find other ways to cope with stress  A counselor or therapist may be able to help you  · Keep a diary to track what you eat and drink  Also write down how many minutes of physical activity you do each day  Weigh yourself once a week and record it in your diary  Eating changes:   You will need to eat 500 to 1,000 fewer calories each day than you currently eat to lose 1 to 2 pounds a week  The following changes will help you cut calories:  · Eat smaller portions  Use small plates, no larger than 9 inches in diameter  Fill your plate half full of fruits and vegetables  Measure your food using measuring cups until you know what a serving size looks like  · Eat 3 meals and 1 or 2 snacks each day  Plan your meals in advance  [employer] and eat at home most of the time  Eat slowly  Do not skip meals  Skipping meals can lead to overeating later in the day  This can make it harder for you to lose weight  Talk with a dietitian to help you make a meal plan and schedule that is right for you  · Eat fruits and vegetables at every meal   They are low in calories and high in fiber, which makes you feel full  Do not add butter, margarine, or cream sauce to vegetables  Use herbs to season steamed vegetables  · Eat less fat and fewer fried foods  Eat more baked or grilled chicken and fish  These protein sources are lower in calories and fat than red meat  Limit fast food  Dress your salads with olive oil and vinegar instead of bottled dressing  · Limit the amount of sugar you eat  Do not drink sugary beverages  Limit alcohol  Activity changes:  Physical activity is good for your body in many ways  It helps you burn calories and build strong muscles  It decreases stress and depression, and improves your mood  It can also help you sleep better  Talk to your healthcare provider before you begin an exercise program   · Exercise for at least 30 minutes 5 days a week  Start slowly  Set aside time each day for physical activity that you enjoy and that is convenient for you  It is best to do both weight training and an activity that increases your heart rate, such as walking, bicycling, or swimming  · Find ways to be more active  Do yard work and housecleaning  Walk up the stairs instead of using elevators   Spend your leisure time going to events that require walking, such as outdoor festivals or fairs  This extra physical activity can help you lose weight and keep it off  Follow up with your doctor as directed: You may need to meet with a dietitian  Write down your questions so you remember to ask them during your visits  © Copyright [employer] 2022 Information is for End User's use only and may not be sold, redistributed or otherwise used for commercial purposes  All illustrations and images included in CareNotes® are the copyrighted property of [employer]  or [facility] Mio Dawson   The above information is an  only  It is not intended as medical advice for individual conditions or treatments  Talk to your doctor, nurse or pharmacist before following any medical regimen to see if it is safe and effective for you  Heart Healthy Diet   AMBULATORY CARE:   A heart healthy diet  is an eating plan low in unhealthy fats and sodium (salt)  The plan is high in healthy fats and fiber  A heart healthy diet helps improve your cholesterol levels and lowers your risk for heart disease and stroke  A dietitian will teach you how to read and understand food labels  Heart healthy diet guidelines to follow:   · Choose foods that contain healthy fats  ? Unsaturated fats  include monounsaturated and polyunsaturated fats  Unsaturated fat is found in foods such as soybean, canola, olive, corn, and safflower oils  It is also found in soft tub margarine that is made with liquid vegetable oil  ? Omega-3 fat  is found in certain fish, such as salmon, tuna, and trout, and in walnuts and flaxseed  Eat fish high in omega-3 fats at least 2 times a week  · Get 20 to 30 grams of fiber each day  Fruits, vegetables, whole-grain foods, and legumes (cooked beans) are good sources of fiber  · Limit or do not have unhealthy fats  ?  Cholesterol  is found in animal foods, such as eggs and lobster, and in dairy products made from whole milk  Limit cholesterol to less than 200 mg each day  ? Saturated fat  is found in meats, such as palacios and hamburger  It is also found in chicken or turkey skin, whole milk, and butter  Limit saturated fat to less than 7% of your total daily calories  ? Trans fat  is found in packaged foods, such as potato chips and cookies  It is also in hard margarine, some fried foods, and shortening  Do not eat foods that contain trans fats  · Limit sodium as directed  You may be told to limit sodium to 2,000 to 2,300 mg each day  Choose low-sodium or no-salt-added foods  Add little or no salt to food you prepare  Use herbs and spices in place of salt  Include the following in your heart healthy plan:  Ask your dietitian or healthcare provider how many servings to have from each of the following food groups:  · Grains:      ? Whole-wheat breads, cereals, and pastas, and brown rice    ? Low-fat, low-sodium crackers and chips    · Vegetables:      ? Broccoli, green beans, green peas, and spinach    ? Collards, kale, and lima beans    ? Carrots, sweet potatoes, tomatoes, and peppers    ? Canned vegetables with no salt added    · Fruits:      ? Bananas, peaches, pears, and pineapple    ? Grapes, raisins, and dates    ? Oranges, tangerines, grapefruit, orange juice, and grapefruit juice    ? Apricots, mangoes, melons, and papaya    ? Raspberries and strawberries    ? Canned fruit with no added sugar    · Low-fat dairy:      ? Nonfat (skim) milk, 1% milk, and low-fat almond, cashew, or soy milks fortified with calcium    ? Low-fat cheese, regular or frozen yogurt, and cottage cheese    · Meats and proteins:      ? Lean cuts of beef and pork (loin, leg, round), skinless chicken and turkey    ? Legumes, soy products, egg whites, or nuts    Limit or do not include the following in your heart healthy plan:   · Unhealthy fats and oils:      ?  Whole or 2% milk, cream cheese, sour cream, or cheese    ? High-fat cuts of beef (T-bone steaks, ribs), chicken or turkey with skin, and organ meats such as liver    ? Butter, stick margarine, shortening, and cooking oils such as coconut or palm oil    · Foods and liquids high in sodium:      ? Packaged foods, such as frozen dinners, cookies, macaroni and cheese, and cereals with more than 300 mg of sodium per serving    ? Vegetables with added sodium, such as instant potatoes, vegetables with added sauces, or regular canned vegetables    ? Cured or smoked meats, such as hot dogs, palacios, and sausage    ? High-sodium ketchup, barbecue sauce, salad dressing, pickles, olives, soy sauce, or miso    · Foods and liquids high in sugar:      ? Candy, cake, cookies, pies, or doughnuts    ? Soft drinks (soda), sports drinks, or sweetened tea    ? Canned or dry mixes for cakes, soups, sauces, or gravies    Other healthy heart guidelines:   · Do not smoke  Nicotine and other chemicals in cigarettes and cigars can cause lung and heart damage  Ask your healthcare provider for information if you currently smoke and need help to quit  E-cigarettes or smokeless tobacco still contain nicotine  Talk to your healthcare provider before you use these products  · Limit or do not drink alcohol as directed  Alcohol can damage your heart and raise your blood pressure  Your healthcare provider may give you specific daily and weekly limits  The general recommended limit is 1 drink a day for women 21 or older and for men 72 or older  Do not have more than 3 drinks in a day or 7 in a week  The recommended limit is 2 drinks a day for men 24to 59years of age  Do not have more than 4 drinks in a day or 14 in a week  A drink of alcohol is 12 ounces of beer, 5 ounces of wine, or 1½ ounces of liquor  · Exercise regularly  Exercise can help you maintain a healthy weight and improve your blood pressure and cholesterol levels   Regular exercise can also decrease your risk for heart problems  Ask your healthcare provider about the best exercise plan for you  Do not start an exercise program without asking your healthcare provider  Follow up with your doctor or cardiologist as directed:  Write down your questions so you remember to ask them during your visits  © Copyright [employer] 2022 Information is for End User's use only and may not be sold, redistributed or otherwise used for commercial purposes  All illustrations and images included in CareNotes® are the copyrighted property of A D A M , Inc  or [facility] Mio Dawson   The above information is an  only  It is not intended as medical advice for individual conditions or treatments  Talk to your doctor, nurse or pharmacist before following any medical regimen to see if it is safe and effective for you  Calorie Counting Diet   WHAT YOU NEED TO KNOW:   What is a calorie counting diet? It is a meal plan based on counting calories each day to reach a healthy body weight  You will need to eat fewer calories if you are trying to lose weight  Weight loss may decrease your risk for certain health problems or improve your health if you have health problems  Some of these health problems include heart disease, high blood pressure, and diabetes  What foods should I avoid? Your dietitian will tell you if you need to avoid certain foods based on your body weight and health condition  You may need to avoid high-fat foods if you are at risk for or have heart disease  You may need to eat fewer foods from the breads and starches food group if you have diabetes  How many calories are in foods? The following is a list of foods and drinks with the approximate number of calories in each  Check the food label to find the exact number of calories  A dietitian can tell you how many calories you should have from each food group each day    · Carbohydrate:      ? ½ of a 3-inch bagel, 1 slice of bread, or ½ of a hamburger bun or hot dog bun (80)    ? 1 (8-inch) flour tortilla or ½ cup of cooked rice (100)    ? 1 (6-inch) corn tortilla (80)    ? 1 (6-inch) pancake or 1 cup of bran flakes cereal (110)    ? ½ cup of cooked cereal (80)    ? ½ cup of cooked pasta (85)    ? 1 ounce of pretzels (100)    ? 3 cups of air-popped popcorn without butter or oil (80)    · Dairy:      ? 1 cup of skim or 1% milk (90)    ? 1 cup of 2% milk (120)    ? 1 cup of whole milk (160)    ? 1 cup of 2% chocolate milk (220)    ? 1 ounce of low-fat cheese with 3 grams of fat per ounce (70)    ? 1 ounce of cheddar cheese (114)    ? ½ cup of 1% fat cottage cheese (80)    ? 1 cup of plain or sugar-free, fat-free yogurt (90)    · Protein foods:      ? 3 ounces of fish (not breaded or fried) (95)    ? 3 ounces of breaded, fried fish (195)    ? ¾ cup of tuna canned in water (105)    ? 3 ounces of chicken breast without skin (105)    ? 1 fried chicken breast with skin (350)    ? ¼ cup of fat free egg substitute (40)    ? 1 large egg (75)    ? 3 ounces of lean beef or pork (165)    ? 3 ounces of fried pork chop or ham (185)    ? ½ cup of cooked dried beans, such as kidney, zelaya, lentils, or navy (115)    ? 3 ounces of bologna or lunch meat (225)    ? 2 links of breakfast sausage (140)    · Vegetables:      ? ½ cup of sliced mushrooms (10)    ? 1 cup of salad greens, such as lettuce, spinach, or luis eduardo (15)    ? ½ cup of steamed asparagus (20)    ? ½ cup of cooked summer squash, zucchini squash, or green or wax beans (25)    ? 1 cup of broccoli or cauliflower florets, or 1 medium tomato (25)    ? 1 large raw carrot or ½ cup of cooked carrots (40)    ? ? of a medium cucumber or 1 stalk of celery (5)    ? 1 small baked potato (160)    ? 1 cup of breaded, fried vegetables (230)    · Fruit:      ? 1 (6-inch) banana (55)     ? ½ of a 4-inch grapefruit (55)    ? 15 grapes (60)    ? 1 medium orange or apple (70)    ? 1 large peach (65)    ? 1 cup of fresh pineapple chunks (75)    ?  1 cup of melon cubes (50)    ? 1¼ cups of whole strawberries (45)    ? ½ cup of fruit canned in juice (55)    ? ½ cup of fruit canned in heavy syrup (110)    ? ? cup of raisins (130)    ? ½ cup of unsweetened fruit juice (60)    ? ½ cup of grape, cranberry, or prune juice (90)    · Fat:      ? 10 peanuts or 2 teaspoons of peanut butter (55)    ? 2 tablespoons of avocado or 1 tablespoon of regular salad dressing (45)    ? 2 slices of palacios (90)    ? 1 teaspoon of oil, such as safflower, canola, corn, or olive oil (45)    ? 2 teaspoons of low-fat margarine, or 1 tablespoon of low-fat mayonnaise (50)    ? 1 teaspoon of regular margarine (40)    ? 1 tablespoon of regular mayonnaise (135)    ? 1 tablespoon of cream cheese or 2 tablespoons of low-fat cream cheese (45)    ? 2 tablespoons of vegetable shortening (215)    · Dessert and sweets:      ? 8 animal crackers or 5 vanilla wafers (80)    ? 1 frozen fruit juice bar (80)    ? ½ cup of ice milk or low-fat frozen yogurt (90)    ? ½ cup of sherbet or sorbet (125)    ? ½ cup of sugar-free pudding or custard (60)    ? ½ cup of ice cream (140)    ? ½ cup of pudding or custard (175)    ? 1 (2-inch) square chocolate brownie (185)    · Combination foods:      ? Bean burrito made with an 8-inch tortilla, without cheese (275)    ? Chicken breast sandwich with lettuce and tomato (325)    ? 1 cup of chicken noodle soup (60)    ? 1 beef taco (175)    ? Regular hamburger with lettuce and tomato (310)    ? Regular cheeseburger with lettuce and tomato (410)     ? ¼ of a 12-inch cheese pizza (280)    ? Fried fish sandwich with lettuce and tomato (425)    ? Hot dog and bun (275)    ? 1½ cups of macaroni and cheese (310)    ? Taco salad with a fried tortilla shell (870)    · Low-calorie foods:      ? 1 tablespoon of ketchup or 1 tablespoon of fat free sour cream (15)    ? 1 teaspoon of mustard (5)    ? ¼ cup of salsa (20)    ? 1 large dill pickle (15)    ?  1 tablespoon of fat free salad dressing (10)    ? 2 teaspoons of low-sugar, light jam or jelly, or 1 tablespoon of sugar-free syrup (15)    ? 1 sugar-free popsicle (15)    ? 1 cup of club soda, seltzer water, or diet soda (0)    CARE AGREEMENT:   You have the right to help plan your care  Discuss treatment options with your healthcare provider to decide what care you want to receive  You always have the right to refuse treatment  The above information is an  only  It is not intended as medical advice for individual conditions or treatments  Talk to your doctor, nurse or pharmacist before following any medical regimen to see if it is safe and effective for you  © Copyright [employer] 2022 Information is for End User's use only and may not be sold, redistributed or otherwise used for commercial purposes   All illustrations and images included in CareNotes® are the copyrighted property of [employer] , Inc  or [address] [place] [address]

## 2022-10-15 DIAGNOSIS — J30.2 SEASONAL ALLERGIES: ICD-10-CM

## 2022-10-17 RX ORDER — MONTELUKAST SODIUM 10 MG/1
TABLET ORAL
Qty: 90 TABLET | Refills: 1 | Status: SHIPPED | OUTPATIENT
Start: 2022-10-17

## 2022-12-08 ENCOUNTER — OFFICE VISIT (OUTPATIENT)
Dept: INTERNAL MEDICINE CLINIC | Age: 42
End: 2022-12-08

## 2022-12-08 VITALS
HEART RATE: 74 BPM | HEIGHT: 76 IN | BODY MASS INDEX: 35.68 KG/M2 | WEIGHT: 293 LBS | DIASTOLIC BLOOD PRESSURE: 60 MMHG | TEMPERATURE: 98.2 F | SYSTOLIC BLOOD PRESSURE: 134 MMHG | OXYGEN SATURATION: 98 %

## 2022-12-08 DIAGNOSIS — H10.31 ACUTE BACTERIAL CONJUNCTIVITIS OF RIGHT EYE: Primary | ICD-10-CM

## 2022-12-08 DIAGNOSIS — J01.90 ACUTE NON-RECURRENT SINUSITIS, UNSPECIFIED LOCATION: ICD-10-CM

## 2022-12-08 RX ORDER — SULFAMETHOXAZOLE AND TRIMETHOPRIM 800; 160 MG/1; MG/1
1 TABLET ORAL 2 TIMES DAILY
Qty: 6 TABLET | Refills: 0 | Status: SHIPPED | OUTPATIENT
Start: 2022-12-08 | End: 2022-12-11

## 2022-12-08 RX ORDER — SULFACETAMIDE SODIUM 100 MG/ML
1 SOLUTION/ DROPS OPHTHALMIC 4 TIMES DAILY
Qty: 15 ML | Refills: 0 | Status: SHIPPED | OUTPATIENT
Start: 2022-12-08

## 2022-12-08 NOTE — PATIENT INSTRUCTIONS
Conjunctivitis   AMBULATORY CARE:   Conjunctivitis,  or pink eye, is inflammation of your conjunctiva  The conjunctiva is a thin tissue that covers the front of your eye and the back of your eyelids  The conjunctiva helps protect your eye and keep it moist  Conjunctivitis may be caused by bacteria, allergies, or a virus  If your conjunctivitis is caused by bacteria, it may get better on its own in about 7 days  Viral conjunctivitis can last up to 3 weeks  Common symptoms may include any of the following: You will usually have symptoms in both eyes if your conjunctivitis is caused by allergies  You may also have other allergic symptoms, such as a rash or runny nose  Symptoms will usually start in 1 eye if your conjunctivitis is caused by a virus or bacteria  Redness in the whites of your eye    Itching in your eye or around your eye    Feeling like there is something in your eye    Watery or thick, sticky discharge    Crusty eyelids when you wake up in the morning    Burning, stinging, or swelling in your eye    Pain when you see bright light    Seek care immediately if:   You have worsening eye pain  The swelling in your eye gets worse, even after treatment  Your vision suddenly becomes worse or you cannot see at all  Contact your healthcare provider if:   You develop a fever and ear pain  You have tiny bumps or spots of blood on your eye  You have questions or concerns about your condition or care  Treatment  will depend on the cause of your conjunctivitis  You may need antibiotics or allergy medicine as a pill, eye drop, or eye ointment  Manage your symptoms:   Apply a cool compress  Wet a washcloth with cold water and place it on your eye  This will help decrease itching and irritation  Do not wear contact lenses  They can irritate your eye  Throw away the pair you are using and ask when you can wear them again  Use a new pair of lenses when your healthcare provider says it is okay  Avoid irritants  Stay away from smoke filled areas  Shield your eyes from wind and sun  Flush your eye  You may need to flush your eye with saline to help decrease your symptoms  Ask for more information on how to flush your eye  Medicines:  Treatment depends on what is causing your conjunctivitis  You may be given any of the following: Allergy medicine  helps decrease itchy, red, swollen eyes caused by allergies  It may be given as a pill, eye drops, or nasal spray  Antibiotics  may be needed if your conjunctivitis is caused by bacteria  This medicine may be given as a pill, eye drops, or eye ointment  Take your medicine as directed  Contact your healthcare provider if you think your medicine is not helping or if you have side effects  Tell him or her if you are allergic to any medicine  Keep a list of the medicines, vitamins, and herbs you take  Include the amounts, and when and why you take them  Bring the list or the pill bottles to follow-up visits  Carry your medicine list with you in case of an emergency  Prevent the spread of conjunctivitis:   Wash your hands with soap and water often  Wash your hands before and after you touch your eyes  Also wash your hands before you prepare or eat food and after you use the bathroom or change a diaper  Avoid allergens  Try to avoid the things that cause your allergies, such as pets, dust, or grass  Avoid contact with others  Do not share towels or washcloths  Try to stay away from others as much as possible  Ask when you can return to work or school  Throw away eye makeup  The bacteria that caused your conjunctivitis can stay in eye makeup  Throw away mascara and other eye makeup  © Copyright [employer] 2022 Information is for End User's use only and may not be sold, redistributed or otherwise used for commercial purposes   All illustrations and images included in CareNotes® are the copyrighted property of [employer]  or [address]  The above information is an  only  It is not intended as medical advice for individual conditions or treatments  Talk to your doctor, nurse or pharmacist before following any medical regimen to see if it is safe and effective for you

## 2022-12-08 NOTE — PROGRESS NOTES
Name: Laura Vance      : 1980      MRN: [id number]  Encounter Provider: Bernadine Quiñonez MD  Encounter Date: 2022   Encounter department: [address] [place] [address] Place     1  Acute bacterial conjunctivitis of right eye  -     sulfacetamide (BLEPH-10) 10 % ophthalmic solution; Administer 1 drop to the right eye 4 (four) times a day  -     sulfamethoxazole-trimethoprim (BACTRIM DS) 800-160 mg per tablet; Take 1 tablet by mouth 2 (two) times a day for 3 days    2  Acute non-recurrent sinusitis, unspecified location        Depression Screening and Follow-up Plan: Patient was screened for depression during today's encounter  They screened negative with a PHQ-2 score of 0  Subjective      Conjunctivitis   The current episode started 2 days ago  The onset was gradual  The problem occurs continuously  The problem has been gradually worsening  The problem is moderate  Nothing relieves the symptoms  Associated symptoms include decreased vision, eye itching, congestion, rhinorrhea and eye redness  Pertinent negatives include no fever, no abdominal pain, no constipation, no diarrhea, no nausea, no ear pain, no headaches, no hearing loss, no sore throat, no neck pain, no cough, no wheezing and no rash  The eye pain is mild  The right eye is affected  The eye pain is not associated with movement  The eyelid exhibits no abnormality  Review of Systems   Constitutional: Negative for chills, fatigue, fever and unexpected weight change  HENT: Positive for congestion, rhinorrhea and sinus pressure  Negative for ear pain, hearing loss, postnasal drip, sore throat, trouble swallowing and voice change  Eyes: Positive for redness and itching  Negative for visual disturbance  Respiratory: Negative for cough, chest tightness, shortness of breath and wheezing  Cardiovascular: Negative for chest pain, palpitations and leg swelling     Gastrointestinal: Negative for abdominal distention, abdominal pain, anal bleeding, blood in stool, constipation, diarrhea and nausea  Endocrine: Negative for cold intolerance, polydipsia, polyphagia and polyuria  Genitourinary: Negative for dysuria, flank pain, frequency, hematuria and urgency  Musculoskeletal: Negative for arthralgias, back pain, gait problem, joint swelling, myalgias and neck pain  Skin: Negative for rash  Allergic/Immunologic: Negative for immunocompromised state  Neurological: Negative for dizziness, syncope, facial asymmetry, weakness, light-headedness, numbness and headaches  Hematological: Negative for adenopathy  Psychiatric/Behavioral: Negative for confusion, sleep disturbance and suicidal ideas         Current Outpatient Medications on File Prior to Visit   Medication Sig   • lisinopril (ZESTRIL) 10 mg tablet TAKE 1 TABLET BY MOUTH EVERY DAY   • montelukast (SINGULAIR) 10 mg tablet TAKE 1 TABLET BY MOUTH DAILY AT BEDTIME   • albuterol (PROVENTIL HFA,VENTOLIN HFA) 90 mcg/act inhaler Inhale 2 puffs every 4 (four) hours as needed for wheezing   • amLODIPine (NORVASC) 5 mg tablet Take 1 tablet (5 mg total) by mouth daily   • cholecalciferol (VITAMIN D3) 1,000 units tablet Take 1,000 Units by mouth daily   • cyclobenzaprine (FLEXERIL) 10 mg tablet Take 1 tablet (10 mg total) by mouth daily at bedtime   • fexofenadine (ALLEGRA) 30 MG tablet Take 30 mg by mouth daily   • fluticasone (FLONASE) 50 mcg/act nasal spray 1 spray into each nostril daily   • Multiple Vitamin (MULTI-DAY VITAMINS) TABS Take by mouth       Objective     /60 (BP Location: Left arm, Patient Position: Sitting, Cuff Size: Standard)   Pulse 74   Temp 98 2 °F (36 8 °C)   Ht 6' 4" (1 93 m)   Wt 133 kg (293 lb)   SpO2 98%   BMI 35 67 kg/m²     Physical Exam  Hans Muñoz MD

## 2023-01-26 DIAGNOSIS — I10 ESSENTIAL HYPERTENSION: ICD-10-CM

## 2023-01-26 RX ORDER — AMLODIPINE BESYLATE 5 MG/1
5 TABLET ORAL DAILY
Qty: 90 TABLET | Refills: 2 | Status: SHIPPED | OUTPATIENT
Start: 2023-01-26

## 2023-01-26 RX ORDER — LISINOPRIL 10 MG/1
TABLET ORAL
Qty: 90 TABLET | Refills: 1 | Status: SHIPPED | OUTPATIENT
Start: 2023-01-26

## 2023-02-06 PROBLEM — H10.31 ACUTE BACTERIAL CONJUNCTIVITIS OF RIGHT EYE: Status: RESOLVED | Noted: 2022-12-08 | Resolved: 2023-02-06

## 2023-02-06 PROBLEM — J01.90 ACUTE NON-RECURRENT SINUSITIS: Status: RESOLVED | Noted: 2022-04-26 | Resolved: 2023-02-06

## 2023-02-18 ENCOUNTER — OFFICE VISIT (OUTPATIENT)
Dept: URGENT CARE | Age: 43
End: 2023-02-18

## 2023-02-18 VITALS
BODY MASS INDEX: 35.8 KG/M2 | RESPIRATION RATE: 20 BRPM | HEART RATE: 93 BPM | SYSTOLIC BLOOD PRESSURE: 157 MMHG | DIASTOLIC BLOOD PRESSURE: 88 MMHG | TEMPERATURE: 99.6 F | OXYGEN SATURATION: 99 % | HEIGHT: 76 IN | WEIGHT: 294 LBS

## 2023-02-18 DIAGNOSIS — J20.9 ACUTE BRONCHITIS, UNSPECIFIED ORGANISM: Primary | ICD-10-CM

## 2023-02-18 LAB
SARS-COV-2 AG UPPER RESP QL IA: NEGATIVE
VALID CONTROL: NORMAL

## 2023-02-18 RX ORDER — DOXYCYCLINE 100 MG/1
100 TABLET ORAL 2 TIMES DAILY
Qty: 14 TABLET | Refills: 0 | Status: SHIPPED | OUTPATIENT
Start: 2023-02-18 | End: 2023-02-25

## 2023-02-18 RX ORDER — PREDNISONE 20 MG/1
40 TABLET ORAL DAILY
Qty: 10 TABLET | Refills: 0 | Status: SHIPPED | OUTPATIENT
Start: 2023-02-18 | End: 2023-02-23

## 2023-02-18 NOTE — PATIENT INSTRUCTIONS
Take antibiotic as prescribed  Recommend probiotic use while taking antibiotic  Avoid direct sunlight with use of doxycyline, reapply SPF 50 at least every 1-2 hours, wear long sleeves, and a wide brimmed hat  Take steroids as directed  Recommend to take them in the morning and with food  Acetaminophen or ibuprofen for fever and pain  Follow-up with PCP in 3-5 days  Go to ER if symptoms worsen

## 2023-02-18 NOTE — PROGRESS NOTES
[facility]        NAME: Melinda Patel is a 38 y o  male  : 1980    MRN: [id number]  DATE: 2023  TIME: 5:28 PM      Assessment and Plan     Acute bronchitis, unspecified organism [J20 9]  1  Acute bronchitis, unspecified organism  Poct Covid 19 Rapid Antigen Test    doxycycline (ADOXA) 100 MG tablet    predniSONE 20 mg tablet          Rapid covid negative  Patient Instructions     Take antibiotic as prescribed  Recommend probiotic use while taking antibiotic  Avoid direct sunlight with use of doxycyline, reapply SPF 50 at least every 1-2 hours, wear long sleeves, and a wide brimmed hat  Take steroids as directed  Recommend to take them in the morning and with food  Acetaminophen or ibuprofen for fever and pain  Follow-up with PCP in 3-5 days  Go to ER if symptoms worsen  Chief Complaint     Chief Complaint   Patient presents with   • Cough     Cough, chest congestion, nasal congestion x 2 days         History of Present Illness     Patient is a 82-year-old male who presents with sinus pain and pressure for 4 days with congestion  Reports cough- states it fluctuates between wet and dry  Reports asthma history- reports wheezing  States he does have an inhaler at home as needed  Denies fever  Denies vomiting or diarrhea  Denies known sick contacts  Reports former smoker  Denies diabetic history  Review of Systems     Review of Systems   Constitutional: Negative for chills and fever  HENT: Positive for congestion, rhinorrhea, sinus pressure and sinus pain  Respiratory: Positive for cough and wheezing  Negative for chest tightness and shortness of breath  Gastrointestinal: Negative for diarrhea, nausea and vomiting  All other systems reviewed and are negative          Current Medications       Current Outpatient Medications:   •  albuterol (PROVENTIL HFA,VENTOLIN HFA) 90 mcg/act inhaler, Inhale 2 puffs every 4 (four) hours as needed for wheezing, Disp: 6 7 Inhaler, Rfl: 0  •  amLODIPine (NORVASC) 5 mg tablet, TAKE 1 TABLET (5 MG TOTAL) BY MOUTH DAILY  , Disp: 90 tablet, Rfl: 2  •  cholecalciferol (VITAMIN D3) 1,000 units tablet, Take 1,000 Units by mouth daily, Disp: , Rfl:   •  cyclobenzaprine (FLEXERIL) 10 mg tablet, Take 1 tablet (10 mg total) by mouth daily at bedtime, Disp: 15 tablet, Rfl: 1  •  doxycycline (ADOXA) 100 MG tablet, Take 1 tablet (100 mg total) by mouth 2 (two) times a day for 7 days, Disp: 14 tablet, Rfl: 0  •  fexofenadine (ALLEGRA) 30 MG tablet, Take 30 mg by mouth daily, Disp: , Rfl:   •  fluticasone (FLONASE) 50 mcg/act nasal spray, 1 spray into each nostril daily, Disp: , Rfl:   •  lisinopril (ZESTRIL) 10 mg tablet, TAKE 1 TABLET BY MOUTH EVERY DAY, Disp: 90 tablet, Rfl: 1  •  montelukast (SINGULAIR) 10 mg tablet, TAKE 1 TABLET BY MOUTH DAILY AT BEDTIME, Disp: 90 tablet, Rfl: 1  •  Multiple Vitamin (MULTI-DAY VITAMINS) TABS, Take by mouth, Disp: , Rfl:   •  predniSONE 20 mg tablet, Take 2 tablets (40 mg total) by mouth daily for 5 days, Disp: 10 tablet, Rfl: 0  •  sulfacetamide (BLEPH-10) 10 % ophthalmic solution, Administer 1 drop to the right eye 4 (four) times a day (Patient not taking: Reported on 2/18/2023), Disp: 15 mL, Rfl: 0    Current Allergies     Allergies as of 02/18/2023 - Reviewed 02/18/2023   Allergen Reaction Noted   • Cefaclor Hives 06/09/2014              The following portions of the patient's history were reviewed and updated as appropriate: allergies, current medications, past family history, past medical history, past social history, past surgical history and problem list      Past Medical History:   Diagnosis Date   • Acute bronchiolitis with bronchospasm     LAST ASSESSED: 11/25/16   • COVID-19 10/14/2021   • Hypertension        Past Surgical History:   Procedure Laterality Date   • TONSILLECTOMY  2013    [facility]       Family History   Problem Relation Age of Onset   • Hypertension Mother    • Thyroid nodules Mother    • Ulcerative colitis Mother    • Hypertension Father    • Heart attack Paternal Grandfather 45   • Heart failure Paternal Grandfather    • Heart attack Paternal Uncle    • Coronary artery disease Paternal Uncle         CABG   • Coronary artery disease Paternal Aunt    • Heart attack Paternal Aunt    • Heart disease Maternal Grandfather         CARDIAC DISORDER   • Heart disease Paternal Grandmother         CARDIAC DISORDER   • Hypertension Paternal Grandmother    • Kidney failure Paternal Grandmother         RENAL         Medications have been verified  Objective     /88   Pulse 93   Temp 99 6 °F (37 6 °C)   Resp 20   Ht 6' 4" (1 93 m)   Wt 133 kg (294 lb)   SpO2 99%   BMI 35 79 kg/m²   No LMP for male patient  Physical Exam     Physical Exam  Vitals and nursing note reviewed  Constitutional:       General: He is not in acute distress  Appearance: Normal appearance  He is not ill-appearing, toxic-appearing or diaphoretic  HENT:      Right Ear: Tympanic membrane, ear canal and external ear normal       Left Ear: Tympanic membrane, ear canal and external ear normal       Nose: Congestion present  Mouth/Throat:      Lips: Pink  Mouth: Mucous membranes are moist       Pharynx: Oropharynx is clear  Uvula midline  Posterior oropharyngeal erythema present  No pharyngeal swelling, oropharyngeal exudate or uvula swelling  Cardiovascular:      Rate and Rhythm: Normal rate  Pulses: Normal pulses  Heart sounds: Normal heart sounds, S1 normal and S2 normal    Pulmonary:      Effort: Pulmonary effort is normal       Breath sounds: Normal air entry  No stridor, decreased air movement or transmitted upper airway sounds  Examination of the right-upper field reveals wheezing  Examination of the left-upper field reveals wheezing  Wheezing present  No decreased breath sounds, rhonchi or rales  Skin:     General: Skin is warm        Capillary Refill: Capillary refill takes less than 2 seconds  Neurological:      General: No focal deficit present  Mental Status: He is alert  Psychiatric:         Mood and Affect: Mood normal          Behavior: Behavior normal          Thought Content:  Thought content normal          Judgment: Judgment normal

## 2023-04-27 ENCOUNTER — NURSE TRIAGE (OUTPATIENT)
Dept: OTHER | Facility: OTHER | Age: 43
End: 2023-04-27

## 2023-04-27 ENCOUNTER — HOSPITAL ENCOUNTER (EMERGENCY)
Facility: HOSPITAL | Age: 43
Discharge: HOME | End: 2023-04-28
Attending: EMERGENCY MEDICINE | Admitting: EMERGENCY MEDICINE

## 2023-04-27 VITALS
DIASTOLIC BLOOD PRESSURE: 117 MMHG | RESPIRATION RATE: 18 BRPM | SYSTOLIC BLOOD PRESSURE: 171 MMHG | OXYGEN SATURATION: 98 % | HEART RATE: 92 BPM | TEMPERATURE: 98.1 F

## 2023-04-27 DIAGNOSIS — E11.9 DIABETES MELLITUS, NEW ONSET (HCC): Primary | ICD-10-CM

## 2023-04-27 LAB
ALBUMIN SERPL BCP-MCNC: 4 G/DL (ref 3.5–5)
ALP SERPL-CCNC: 102 U/L (ref 46–116)
ALT SERPL W P-5'-P-CCNC: 186 U/L (ref 12–78)
ANION GAP SERPL CALCULATED.3IONS-SCNC: 9 MMOL/L (ref 4–13)
AST SERPL W P-5'-P-CCNC: 105 U/L (ref 5–45)
BACTERIA UR QL AUTO: NORMAL /HPF
BASOPHILS # BLD AUTO: 0.14 THOUSANDS/ΜL (ref 0–0.1)
BASOPHILS NFR BLD AUTO: 2 % (ref 0–1)
BETA-HYDROXYBUTYRATE: 0.5 MMOL/L
BILIRUB SERPL-MCNC: 0.78 MG/DL (ref 0.2–1)
BILIRUB UR QL STRIP: NEGATIVE
BUN SERPL-MCNC: 14 MG/DL (ref 5–25)
CALCIUM SERPL-MCNC: 10.4 MG/DL (ref 8.3–10.1)
CHLORIDE SERPL-SCNC: 93 MMOL/L (ref 96–108)
CLARITY UR: CLEAR
CO2 SERPL-SCNC: 23 MMOL/L (ref 21–32)
COLOR UR: YELLOW
CREAT SERPL-MCNC: 1.1 MG/DL (ref 0.6–1.3)
EOSINOPHIL # BLD AUTO: 0.18 THOUSAND/ΜL (ref 0–0.61)
EOSINOPHIL NFR BLD AUTO: 2 % (ref 0–6)
ERYTHROCYTE [DISTWIDTH] IN BLOOD BY AUTOMATED COUNT: 13.4 % (ref 11.6–15.1)
GFR SERPL CREATININE-BSD FRML MDRD: 82 ML/MIN/1.73SQ M
GLUCOSE SERPL-MCNC: 515 MG/DL (ref 65–140)
GLUCOSE SERPL-MCNC: >500 MG/DL (ref 65–140)
GLUCOSE UR STRIP-MCNC: ABNORMAL MG/DL
HCT VFR BLD AUTO: 44.3 % (ref 36.5–49.3)
HGB BLD-MCNC: 15.4 G/DL (ref 12–17)
HGB UR QL STRIP.AUTO: NEGATIVE
IMM GRANULOCYTES # BLD AUTO: 0.04 THOUSAND/UL (ref 0–0.2)
IMM GRANULOCYTES NFR BLD AUTO: 1 % (ref 0–2)
KETONES UR STRIP-MCNC: ABNORMAL MG/DL
LEUKOCYTE ESTERASE UR QL STRIP: ABNORMAL
LYMPHOCYTES # BLD AUTO: 3.17 THOUSANDS/ΜL (ref 0.6–4.47)
LYMPHOCYTES NFR BLD AUTO: 37 % (ref 14–44)
MCH RBC QN AUTO: 31.2 PG (ref 26.8–34.3)
MCHC RBC AUTO-ENTMCNC: 34.8 G/DL (ref 31.4–37.4)
MCV RBC AUTO: 90 FL (ref 82–98)
MONOCYTES # BLD AUTO: 0.55 THOUSAND/ΜL (ref 0.17–1.22)
MONOCYTES NFR BLD AUTO: 6 % (ref 4–12)
NEUTROPHILS # BLD AUTO: 4.46 THOUSANDS/ΜL (ref 1.85–7.62)
NEUTS SEG NFR BLD AUTO: 52 % (ref 43–75)
NITRITE UR QL STRIP: NEGATIVE
NON-SQ EPI CELLS URNS QL MICRO: NORMAL /HPF
NRBC BLD AUTO-RTO: 0 /100 WBCS
PH UR STRIP.AUTO: 5 [PH] (ref 4.5–8)
PLATELET # BLD AUTO: 205 THOUSANDS/UL (ref 149–390)
PMV BLD AUTO: 11.2 FL (ref 8.9–12.7)
POTASSIUM SERPL-SCNC: 3.9 MMOL/L (ref 3.5–5.3)
PROT SERPL-MCNC: 7.9 G/DL (ref 6.4–8.4)
PROT UR STRIP-MCNC: NEGATIVE MG/DL
RBC # BLD AUTO: 4.93 MILLION/UL (ref 3.88–5.62)
RBC #/AREA URNS AUTO: NORMAL /HPF
SODIUM SERPL-SCNC: 125 MMOL/L (ref 135–147)
SP GR UR STRIP.AUTO: <=1.005 (ref 1–1.03)
UROBILINOGEN UR QL STRIP.AUTO: 0.2 E.U./DL
WBC # BLD AUTO: 8.54 THOUSAND/UL (ref 4.31–10.16)
WBC #/AREA URNS AUTO: NORMAL /HPF

## 2023-04-27 RX ORDER — SODIUM CHLORIDE, SODIUM GLUCONATE, SODIUM ACETATE, POTASSIUM CHLORIDE, MAGNESIUM CHLORIDE, SODIUM PHOSPHATE, DIBASIC, AND POTASSIUM PHOSPHATE .53; .5; .37; .037; .03; .012; .00082 G/100ML; G/100ML; G/100ML; G/100ML; G/100ML; G/100ML; G/100ML
1000 INJECTION, SOLUTION INTRAVENOUS ONCE
Status: COMPLETED | OUTPATIENT
Start: 2023-04-27 | End: 2023-04-28

## 2023-04-27 RX ADMIN — SODIUM CHLORIDE, SODIUM GLUCONATE, SODIUM ACETATE, POTASSIUM CHLORIDE, MAGNESIUM CHLORIDE, SODIUM PHOSPHATE, DIBASIC, AND POTASSIUM PHOSPHATE 1000 ML: .53; .5; .37; .037; .03; .012; .00082 INJECTION, SOLUTION INTRAVENOUS at 21:48

## 2023-04-27 RX ADMIN — SODIUM CHLORIDE 1000 ML: 0.9 INJECTION, SOLUTION INTRAVENOUS at 21:48

## 2023-04-27 NOTE — Clinical Note
Lashonda Burn was seen and treated in our emergency department on 4/27/2023  No restrictions            Diagnosis: New diabetes  Milus Orn    He may return on this date: If you have any questions or concerns, please don't hesitate to call        Marya Denver, MD    ______________________________           _______________          _______________  Hospital Representative                              Date                                Time

## 2023-04-28 LAB
ATRIAL RATE: 84 BPM
EST. AVERAGE GLUCOSE BLD GHB EST-MCNC: 266 MG/DL
HBA1C MFR BLD: 10.9 %
P AXIS: 72 DEGREES
PR INTERVAL: 192 MS
QRS AXIS: 56 DEGREES
QRSD INTERVAL: 98 MS
QT INTERVAL: 350 MS
QTC INTERVAL: 413 MS
T WAVE AXIS: 63 DEGREES
VENTRICULAR RATE: 84 BPM

## 2023-04-28 NOTE — DISCHARGE INSTRUCTIONS
Please return for significant worsening as we discussed  You absolutely need to change your diet; No soda!    Or at least significantly reduce the amount

## 2023-04-28 NOTE — TELEPHONE ENCOUNTER
"Patient calls in stating he is concerned he's developed diabetes He checked his blood sugar with moms device at home and reads \"high\"  Patient is symptomatic with dizziness  Recommended ER evaluation    "

## 2023-04-28 NOTE — ED PROVIDER NOTES
Final Diagnoses:     1  Diabetes mellitus, new onset [facility])      ED Course as of 04/27/23 2311   u Apr 27, 2023   2141 This EKG was interpreted by me  The EKG demonstrates Normal sinus rhythm, normal intervals and axis, normal QRS, no acute ST changes present  HR 84  No old  2204 Leukocytes, UA(!): Elevated glucose may cause decreased leukocyte values  See urine microscopic for [facility] result/   2204 Nitrite, UA: Negative   2204 Ketones, UA(!): 15 (1+)   2204 Glucose, UA(!): >=1000 (1%)   2204 SL AMB SPECIFIC GRAVITY_URINE: <=1 005   2204 BETA-HYDROXYBUTYRATE: 0 5   2245 Glucose, Random(!!): 515   2245 ALT(!): 186   2245 AST(!): 105   2245 Calcium(!): 10 4   2245 Sodium(!): 125   2245 Glucose, Random(!!): 515   2246 Sodium(!): 125  Corrects to 135     2300 I reviewed all the labs with the patient  I discussed I do not know why his calcium is elevated however discussed the low sodium likely be related to the glucose, discussed hepatic steatosis which the patient has been told about before  Reviewed the normal beta hydroxybutyrate and other labs  Were still waiting for the A1c however discussed is not can   I offered admission for new onset diabetes versus discharge  The patient is okay going home, discussed metformin, discussed specific return to ER precautions  She has no questions at this time  2310 I reviewed all testing with the patient  I gave oral return precautions for what to return for in addition to the written return precautions  The patient (and any family present: mom) verbalized understanding of the discharge instructions and warnings that would necessitate return to the Emergency Department  I specifically highlighted areas of special concern regarding the written and verbal discharge instructions and return precautions  All questions were answered prior to discharge         Nursing Triage:     Chief Complaint   Patient presents with   • Dizziness     Pt states hes been having on and off dizziness, states he thought he was having low blood sugar, checked with his moms glucometer and his sugar was high  HPI:   This is a 41 y o  male presenting for evaluation of likely new onset diabetes  The patient states that he has been having weight loss in the last 2 months, feeling dizzy on and off  He also mentions cramps in the lower extremities  No fevers chills nausea vomiting  No change in diet  He does admit to a very poor diet overall including that he had 3 soda bottles today  No fevers  No urinary symptoms of burning but has no significant increased urinary frequency and continuous thirst   Mother was just diagnosed with diabetes a few months ago  He checked his glucose at home, found it was greater than 500 so came in for evaluation  Past medical history includes just hypertension on amlodipine  No surgeries  Denies smoking drinking drugs  No recent travel  ASSESSMENT + PLAN:   Dizziness likely secondary to dehydration, likely also has new onset diabetes  We will check electrolytes specifically for potassium deviations due to excessive urination  We will give fluids for hydration, EKG as part of the dizziness work-up  We will check a glycosylated hemoglobin level, disposition per work-up, likely discharge home  Sent of a beta hydroxybutyrate to rule out DKA, low suspicion overall  Urine for same reason  Likely start metformin if discharging home, otherwise will admit and let inpatient management  He does have a primary care doctor and discussed the importance of following up with the [address] [place] [address]    Physical:   General: VSS, NAD, awake, alert  Well-nourished, well-developed  Appears stated age  Speaking normally in full sentences  Head: Normocephalic, atraumatic, nontender  Eyes: PERRL, EOM-I  No diplopia  No hyphema  No subconjunctival hemorrhages  Symmetrical lids  ENT: Atraumatic external nose and ears      Dry MM  No malocclusion  No stridor  Normal phonation  No drooling  Normal swallowing  Neck: Symmetric, trachea midline  No JVD  CV: RRR  +S1/S2  No murmurs or gallops  Peripheral pulses +2 throughout  No chest wall tenderness  Lungs:   Unlabored No retractions  CTAB, lungs sounds equal bilateral    No tachypnea  Abd: +BS, soft, NT/ND    MSK:   FROM   Back:   No rashes  Skin: Dry, intact  Neuro: AAOx3, GCS 15, CN II-XII grossly intact  Motor grossly intact  Psychiatric/Behavioral: Appropriate mood and affect   Exam: deferred    Vitals:    04/27/23 2106   BP: (!) 171/117   Pulse: 92   Resp: 18   Temp: 98 1 °F (36 7 °C)   SpO2: 98%     - There are no obvious limitations to social determinants of care  - Nursing note reviewed  - Vitals reviewed  - Orders placed by myself and/or advanced practitioner / resident     - Previous chart was reviewed  - No language barrier    - History obtained from patient  - There are no limitations to the history obtained:     Past Medical:    has a past medical history of Acute bronchiolitis with bronchospasm, COVID-19 (10/14/2021), and Hypertension  Past Surgical:    has a past surgical history that includes Tonsillectomy (2013)  Social:     Social History     Substance and Sexual Activity   Alcohol Use Yes   • Alcohol/week: 1 0 standard drink   • Types: 1 Cans of beer per week    Comment: DRINKS APPROX 2 TIMES PER MONTH     Social History     Tobacco Use   Smoking Status Former   • Packs/day: 0 50   • Years: 17 00   • Pack years: 8 50   • Types: Cigarettes   • Quit date: 1/1/2020   • Years since quitting: 3 3   Smokeless Tobacco Never   Tobacco Comments          Social History     Substance and Sexual Activity   Drug Use No       Echo:   No results found for this or any previous visit  No results found for this or any previous visit  Cath:    No results found for this or any previous visit        Code Status: No Order  Advance Directive and Living Will: Power of :    POLST:    Medications   multi-electrolyte (ISOLYTE-S PH 7 4) bolus 1,000 mL (1,000 mL Intravenous New Bag 4/27/23 2148)   sodium chloride 0 9 % bolus 1,000 mL (1,000 mL Intravenous New Bag 4/27/23 2148)     No orders to display     Orders Placed This Encounter   Procedures   • CBC and differential   • Comprehensive metabolic panel   • Beta Hydroxybutyrate   • Hemoglobin A1C   • Urine Microscopic   • Ambulatory Referral to Endocrinology   • Urine dip analyzer   • Insert peripheral IV   • ECG 12 lead     Labs Reviewed   CBC AND DIFFERENTIAL - Abnormal       Result Value Ref Range Status    WBC 8 54  4 31 - 10 16 Thousand/uL Final    RBC 4 93  3 88 - 5 62 Million/uL Final    Hemoglobin 15 4  12 0 - 17 0 g/dL Final    Hematocrit 44 3  36 5 - 49 3 % Final    MCV 90  82 - 98 fL Final    MCH 31 2  26 8 - 34 3 pg Final    MCHC 34 8  31 4 - 37 4 g/dL Final    RDW 13 4  11 6 - 15 1 % Final    MPV 11 2  8 9 - 12 7 fL Final    Platelets 834  944 - 390 Thousands/uL Final    nRBC 0  /100 WBCs Final    Neutrophils Relative 52  43 - 75 % Final    Immat GRANS % 1  0 - 2 % Final    Lymphocytes Relative 37  14 - 44 % Final    Monocytes Relative 6  4 - 12 % Final    Eosinophils Relative 2  0 - 6 % Final    Basophils Relative 2 (*) 0 - 1 % Final    Neutrophils Absolute 4 46  1 85 - 7 62 Thousands/µL Final    Immature Grans Absolute 0 04  0 00 - 0 20 Thousand/uL Final    Lymphocytes Absolute 3 17  0 60 - 4 47 Thousands/µL Final    Monocytes Absolute 0 55  0 17 - 1 22 Thousand/µL Final    Eosinophils Absolute 0 18  0 00 - 0 61 Thousand/µL Final    Basophils Absolute 0 14 (*) 0 00 - 0 10 Thousands/µL Final   COMPREHENSIVE METABOLIC PANEL - Abnormal    Sodium 125 (*) 135 - 147 mmol/L Final    Potassium 3 9  3 5 - 5 3 mmol/L Final    Comment: Slightly Hemolyzed;  Results May be Affected    Chloride 93 (*) 96 - 108 mmol/L Final    CO2 23  21 - 32 mmol/L Final    ANION GAP 9  4 - 13 mmol/L Final    BUN 14  5 - 25 mg/dL Final    Creatinine 1 10  0 60 - 1 30 mg/dL Final    Comment: Standardized to IDMS reference method    Glucose 515 (*) 65 - 140 mg/dL Final    Comment: If the patient is fasting, the ADA then defines impaired fasting glucose as > 100 mg/dL and diabetes as > or equal to 123 mg/dL  Specimen collection should occur prior to Sulfasalazine administration due to the potential for falsely depressed results  Specimen collection should occur prior to Sulfapyridine administration due to the potential for falsely elevated results  Calcium 10 4 (*) 8 3 - 10 1 mg/dL Final     (*) 5 - 45 U/L Final    Comment: Slightly Hemolyzed; Results May be Affected  Specimen collection should occur prior to Sulfasalazine administration due to the potential for falsely depressed results   (*) 12 - 78 U/L Final    Comment: Specimen collection should occur prior to Sulfasalazine and/or Sulfapyridine administration due to the potential for falsely depressed results  Alkaline Phosphatase 102  46 - 116 U/L Final    Total Protein 7 9  6 4 - 8 4 g/dL Final    Albumin 4 0  3 5 - 5 0 g/dL Final    Total Bilirubin 0 78  0 20 - 1 00 mg/dL Final    Comment: Use of this assay is not recommended for patients undergoing treatment with eltrombopag due to the potential for falsely elevated results      eGFR 82  ml/min/1 73sq m Final    Narrative:     [place] guidelines for Chronic Kidney Disease (CKD):   •  Stage 1 with normal or high GFR (GFR > 90 mL/min/1 73 square meters)  •  Stage 2 Mild CKD (GFR = 60-89 mL/min/1 73 square meters)  •  Stage 3A Moderate CKD (GFR = 45-59 mL/min/1 73 square meters)  •  Stage 3B Moderate CKD (GFR = 30-44 mL/min/1 73 square meters)  •  Stage 4 Severe CKD (GFR = 15-29 mL/min/1 73 square meters)  •  Stage 5 End Stage CKD (GFR <15 mL/min/1 73 square meters)  Note: GFR calculation is accurate only with a steady state creatinine   POCT GLUCOSE - Abnormal    POC Glucose >500 (*) 65 - 140 mg/dl Final    Comment: CRITICAL VALUE NOTED   URINE MACROSCOPIC, POC - Abnormal    Color, UA Yellow   Final    Clarity, UA Clear   Final    pH, UA 5 0  4 5 - 8 0 Final    Leukocytes, UA   (*) Negative Final    Value: Elevated glucose may cause decreased leukocyte values  See urine microscopic for [facility] result/    Nitrite, UA Negative  Negative Final    Protein, UA Negative  Negative mg/dl Final    Glucose, UA >=1000 (1%) (*) Negative mg/dl Final    Ketones, UA 15 (1+) (*) Negative mg/dl Final    Urobilinogen, UA 0 2  0 2, 1 0 E U /dl E U /dl Final    Bilirubin, UA Negative  Negative Final    Occult Blood, UA Negative  Negative Final    Specific Gravity, UA <=1 005  1 003 - 1 030 Final    Narrative:     CLINITEK RESULT   BETA HYDROXYBUTYRATE - Normal    BETA-HYDROXYBUTYRATE 0 5  <0 6 mmol/L Final   URINE MICROSCOPIC - Normal    RBC, UA 1-2  None Seen, 1-2 /hpf Final    WBC, UA None Seen  None Seen, 1-2 /hpf Final    Epithelial Cells Occasional  None Seen, Occasional /hpf Final    Bacteria, UA None Seen  None Seen, Occasional /hpf Final   HEMOGLOBIN A1C     Time reflects when diagnosis was documented in both MDM as applicable and the Disposition within this note     Time User Action Codes Description Comment    4/27/2023  9:38 PM Alesha Coker Add [E11 9] Diabetes mellitus, new onset [facility])       ED Disposition     ED Disposition   Discharge    Condition   Stable    Date/Time   Thu Apr 27, 2023  9:38 PM    Comment   Analy Hernandez Redline discharge to home/self care                 Follow-up Information     Follow up With Specialties Details Why Contact Info Additional [address] Emergency Department Emergency Medicine Go to  If symptoms worsen [address] Emergency Department, [address], [place], [address] Nicolette Mckeon MD Family Medicine, Internal Medicine "Call today To make appointment for re-eval in 3-5 days [address] [id number] [phone]           Patient's Medications   Discharge Prescriptions    METFORMIN (GLUCOPHAGE) 500 MG TABLET    Take 1 tablet (500 mg total) by mouth 2 (two) times a day with meals       Start Date: 2023 End Date: 2023       Order Dose: 500 mg       Quantity: 60 tablet    Refills: 0       Prior to Admission Medications   Prescriptions Last Dose Informant Patient Reported? Taking? Multiple Vitamin (MULTI-DAY VITAMINS) TABS   Yes No   Sig: Take by mouth   albuterol (PROVENTIL HFA,VENTOLIN HFA) 90 mcg/act inhaler   No No   Sig: Inhale 2 puffs every 4 (four) hours as needed for wheezing   amLODIPine (NORVASC) 5 mg tablet   No No   Sig: TAKE 1 TABLET (5 MG TOTAL) BY MOUTH DAILY  cholecalciferol (VITAMIN D3) 1,000 units tablet   Yes No   Sig: Take 1,000 Units by mouth daily   cyclobenzaprine (FLEXERIL) 10 mg tablet   No No   Sig: Take 1 tablet (10 mg total) by mouth daily at bedtime   fexofenadine (ALLEGRA) 30 MG tablet   Yes No   Sig: Take 30 mg by mouth daily   fluticasone (FLONASE) 50 mcg/act nasal spray   Yes No   Si spray into each nostril daily   lisinopril (ZESTRIL) 10 mg tablet   No No   Sig: TAKE 1 TABLET BY MOUTH EVERY DAY   montelukast (SINGULAIR) 10 mg tablet   No No   Sig: TAKE 1 TABLET BY MOUTH DAILY AT BEDTIME   sulfacetamide (BLEPH-10) 10 % ophthalmic solution   No No   Sig: Administer 1 drop to the right eye 4 (four) times a day   Patient not taking: Reported on 2023      Facility-Administered Medications: None                        Portions of the record may have been created with voice recognition software  Occasional wrong word or \"sound a like\" substitutions may have occurred due to the inherent limitations of voice recognition software  Read the chart carefully and recognize, using context, where substitutions have occurred      Electronically signed by:  Crys Adan Yolette Vance MD  04/27/23 9965

## 2023-04-28 NOTE — TELEPHONE ENCOUNTER
"Regarding: Cramps on the legs and feet pain  Advice  ----- Message from [address] sent at 4/27/2023  8:03 PM EDT -----  \" I am calling because in the past days I have been suffering from cramps in my legs and my feet hurt  My mom is diabetic, and I was reading that those were some of the symptoms  Today when I got home I took my sugar levels and it said it was high  I had a soda an hour ago, I don't know if I have to take that into account  I would like to know if I have to go to the ER   \"    "

## 2023-04-28 NOTE — TELEPHONE ENCOUNTER
Reason for Disposition  • Patient sounds very sick or weak to the triager    Protocols used: DIABETES - HIGH BLOOD SUGAR-ADULT-AH

## 2023-05-02 ENCOUNTER — CONSULT (OUTPATIENT)
Dept: ENDOCRINOLOGY | Facility: CLINIC | Age: 43
End: 2023-05-02

## 2023-05-02 VITALS
WEIGHT: 264.13 LBS | HEIGHT: 76 IN | DIASTOLIC BLOOD PRESSURE: 80 MMHG | BODY MASS INDEX: 32.16 KG/M2 | HEART RATE: 73 BPM | SYSTOLIC BLOOD PRESSURE: 120 MMHG

## 2023-05-02 DIAGNOSIS — E11.9 DIABETES MELLITUS, NEW ONSET (HCC): ICD-10-CM

## 2023-05-02 DIAGNOSIS — L84 CALLUSE: Primary | ICD-10-CM

## 2023-05-02 RX ORDER — BLOOD-GLUCOSE METER
1 EACH MISCELLANEOUS 3 TIMES DAILY
COMMUNITY

## 2023-05-02 RX ORDER — METFORMIN HYDROCHLORIDE 500 MG/1
500 TABLET, EXTENDED RELEASE ORAL 2 TIMES DAILY WITH MEALS
Qty: 60 TABLET | Refills: 4 | Status: SHIPPED | OUTPATIENT
Start: 2023-05-02

## 2023-05-02 NOTE — PROGRESS NOTES
New Patient Progress Note      Chief Complaint   Patient presents with    Diabetes Type 2      Referring Provider  Rina Camp Md  [address] [facility],  [address] [facility]     History of Present Illness:   Yen Ac is a 41 y o  male with a history of type 2 diabetes  rashid t the request of Dr Rina Camp, [facility] acute hospital ED  He presented to the ED on 23 after checking his Bg at home ramona  Family member's meter and seeing a Bg of >500  In the ED, he had a serum glucose of 515 and A1c 10 9%  He was not admitted and given metformin 500mg twice daily and some IVF    For the past few weeks, he noted leg cramping and lightheadedness  He had increased thirst and urination for about 3 weeks, and also lost 18# rapidly in about 3 weeks  Denies recent illness or hospitalizations  Mother and her family with diabetes  Of note, labs in ED show likely dehydration which have improved  LFTs have been elevated  Current regimen: metformin 500mg twice daily  Has some increased BMs using metformin, especially in the last day  further diabetic ROS: feels urination and thirst has improved  Recalled home blood glucose readings:   Before breakfast: 230  Before lunch: 300-304  Before dinner:   Bedtime: 230s      Diabetes education: No  Diet:  3 meals per day, a few snacks per day  Snack will be vegetables  Will use brown rice and will have lower carb breads  His mother has diabetes and he has been working with her diet  diabetic diet compliance: stopped drinking sodas entirely for the past week  Activity: not exercising regularly  Work is sedentary but darryn walk for 10mins at lunch for work          Opthalmology:  Needs to be seen  Podiatry: needs to see    Has hypertension since his 25s: followed by PCP; on ACE inhibitor and amlodipine  Father  of MI    Thyroid disorders: none  History of pancreatitis: denies pancreatitis; alcohol a few per month; no hx of hypertrigs or gallbladder issues  Patient Active Problem List   Diagnosis    Abnormal LFTs    Essential hypertension    BMI 36 0-36 9,adult    Vitamin D deficiency    Fatty liver    Degenerative lumbar disc    Chronic rhinitis    Seasonal allergies    Annual physical exam      Past Medical History:   Diagnosis Date    Acute bronchiolitis with bronchospasm     LAST ASSESSED: 11/25/16    COVID-19 10/14/2021    Hypertension       Past Surgical History:   Procedure Laterality Date    [facility]  2013    [facility]      Family History   Problem Relation Age of Onset    Hypertension Mother     Thyroid nodules Mother     Ulcerative colitis Mother     Diabetes unspecified Mother     Thyroid disease unspecified Mother     Hypertension Father     Heart attack Paternal Grandfather 45    Heart failure Paternal Grandfather     Heart attack Paternal Uncle     Coronary artery disease Paternal Uncle         CABG    Coronary artery disease Paternal Aunt     Heart attack Paternal Aunt     Heart disease Maternal Grandfather         CARDIAC DISORDER    Heart disease Paternal Grandmother         CARDIAC DISORDER    Hypertension Paternal Grandmother     Kidney failure Paternal Grandmother         RENAL     Social History     Tobacco Use    Smoking status: Former     Packs/day: 0 50     Years: 17 00     Pack years: 8 50     Types: Cigarettes     Quit date: 1/1/2020     Years since quitting: 3 3    Smokeless tobacco: Never    Tobacco comments:         Substance Use Topics    Alcohol use: Yes     Comment: DRINKS APPROX 2 TIMES PER MONTH     Allergies   Allergen Reactions    Cefaclor Hives         Current Outpatient Medications:     albuterol (PROVENTIL HFA,VENTOLIN HFA) 90 mcg/act inhaler, Inhale 2 puffs every 4 (four) hours as needed for wheezing, Disp: 6 7 Inhaler, Rfl: 0    amLODIPine (NORVASC) 5 mg tablet, TAKE 1 TABLET (5 MG TOTAL) BY MOUTH DAILY  , Disp: 90 tablet, Rfl: 2    Blood Glucose Monitoring Suppl (OneTouch Verio) w/Device KIT, Use 1 Device 3 (three) times a day, Disp: , Rfl:     cholecalciferol (VITAMIN D3) 1,000 units tablet, Take 1,000 Units by mouth daily, Disp: , Rfl:     cyclobenzaprine (FLEXERIL) 10 mg tablet, Take 1 tablet (10 mg total) by mouth daily at bedtime, Disp: 15 tablet, Rfl: 1    fexofenadine (ALLEGRA) 30 MG tablet, Take 30 mg by mouth daily, Disp: , Rfl:     fluticasone (FLONASE) 50 mcg/act nasal spray, 1 spray into each nostril daily, Disp: , Rfl:     glucose blood test strip, Use 1 each daily as needed Verio - Use to test 3x daily, Disp: , Rfl:     lisinopril (ZESTRIL) 10 mg tablet, TAKE 1 TABLET BY MOUTH EVERY DAY, Disp: 90 tablet, Rfl: 1    metFORMIN (GLUCOPHAGE-XR) 500 mg 24 hr tablet, Take 1 tablet (500 mg total) by mouth 2 (two) times a day with meals, Disp: 60 tablet, Rfl: 4    montelukast (SINGULAIR) 10 mg tablet, TAKE 1 TABLET BY MOUTH DAILY AT BEDTIME, Disp: 90 tablet, Rfl: 1    Multiple Vitamin (MULTI-DAY VITAMINS) TABS, Take by mouth, Disp: , Rfl:     sulfacetamide (BLEPH-10) 10 % ophthalmic solution, Administer 1 drop to the right eye 4 (four) times a day (Patient not taking: Reported on 2/18/2023), Disp: 15 mL, Rfl: 0  Review of Systems   Constitutional: Positive for unexpected weight change  HENT: Negative for hearing loss, trouble swallowing and voice change  Eyes: Negative for visual disturbance  Respiratory: Negative for cough and shortness of breath  Cardiovascular: Negative for chest pain and palpitations  Gastrointestinal: Positive for diarrhea  Negative for constipation, nausea and vomiting  Endocrine: Positive for polydipsia and polyuria  Musculoskeletal: Positive for back pain (hernitated disks)  Negative for arthralgias and myalgias  Skin:        Denies changes in hair/skin/nails   Neurological: Negative for tremors, weakness and numbness  Psychiatric/Behavioral: The patient is not nervous/anxious      All other systems reviewed and are "negative  Physical Exam:  Body mass index is 32 15 kg/m²  /80 (BP Location: Left arm, Patient Position: Sitting, Cuff Size: Large)   Pulse 73   Ht 6' 4\" (1 93 m)   Wt 120 kg (264 lb 2 oz)   BMI 32 15 kg/m²    Wt Readings from Last 3 Encounters:   05/02/23 120 kg (264 lb 2 oz)   02/18/23 133 kg (294 lb)   12/08/22 133 kg (293 lb)       GEN: NAD  E/n/m nl facies, hearing intact bilat, tongue midline, lips nl  Eyes: no stare or proptosis, nl lids and conjunctiva, EOMI  Neck: trachea midline, thyroid NT to palpation, nl in size, no nodules or neck masses noted, no cervical LAD  CV; heart reg rate s1s2 nl, no m/r/g appreciated, no KANG  Resp: CTAB, good effort  Ab+BS  Neuro: no tremor  MS: no c/c in digits, moves all 4 ext, nl muscle bulk, gait nl  Skin: warm and dry, no palmar erythema  Psych: nl mood and affect, no gross lapses in memory    Patient's shoes and socks removed  Right Foot/Ankle   Right Foot Inspection  Skin Exam: skin intact, dry skin, callus and callus  No warmth, no pre-ulcer and no ulcer  Toe Exam: ROM and strength within normal limits  Sensory   Vibration: diminished  Monofilament testing: intact    Vascular  Capillary refills: < 3 seconds  The right DP pulse is 2+  Left Foot/Ankle  Left Foot Inspection  Skin Exam: skin intact, dry skin and callus  No warmth, no pre-ulcer and no ulcer  Toe Exam: ROM and strength within normal limits  Sensory   Vibration: diminished  Monofilament testing: intact    Vascular  The left DP pulse is 2+       Assign Risk Category  No deformity present  Loss of protective sensation  No weak pulses  Risk: 1        Labs:   Lab Results   Component Value Date    HGBA1C 10 9 (H) 04/27/2023          Lab Results   Component Value Date    CREATININE 1 10 04/27/2023    CREATININE 1 19 01/10/2020    CREATININE 1 13 03/27/2018    BUN 14 04/27/2023    K 3 9 04/27/2023    CL 93 (L) 04/27/2023    CO2 23 04/27/2023     eGFR   Date Value Ref Range Status " 04/27/2023 82 ml/min/1 73sq m Final     No components found for: [facility]        Lab Results   Component Value Date     (H) 04/27/2023     (H) 04/27/2023    ALKPHOS 102 04/27/2023           Impression:  1  Calluse    2  Diabetes mellitus, new onset ([address] )           Plan:    Tc Villanueva was seen today for diabetes type 2  Diagnoses and all orders for this visit:    Carmelita Austin  -     Ambulatory referral to Podiatry; Future    Diabetes mellitus, new onset [facility])  -     Ambulatory Referral to Endocrinology  -     Ambulatory referral to Optometry; Future  -     Ambulatory referral to Diabetic Education; Future  -     Ambulatory referral to Podiatry; Future  -     metFORMIN (GLUCOPHAGE-XR) 500 mg 24 hr tablet; Take 1 tablet (500 mg total) by mouth 2 (two) times a day with meals  -     Lipid panel Lab Collect Lab Collect; Future  -     Microalbumin / creatinine urine ratio; Future  -     Comprehensive metabolic panel Lab Collect; Future         1  T2 DM, uncontrolled: Reviewed changes in diet and activity to improve  Advised checking Bgs 3x daily with logs sent to the office in 2-3 weeks  Recommend DM education and nutrition  Will change to metformin ER 500mg twice daily and see if this helps GI effects  Should see Podiatry, and likely has some sensory changes from known herniated disks  Repeat CMP    2  HTN: Adherent to care, including ACEI/ARB  Due for Microalb:cr ratio which is ordered    3  NAFLD: Not on statin  Will need to document current lipid panel prior to considering GLP    Discussed with the patient and all questioned fully answered  He will call me if any problems arise      Counseled patient on diagnostic results, prognosis, risk and benefit of treatment options, instruction for management, importance of treatment compliance, Risk  factor reduction and impressions      Georgette Harman MD

## 2023-05-09 ENCOUNTER — OFFICE VISIT (OUTPATIENT)
Dept: FAMILY MEDICINE CLINIC | Facility: CLINIC | Age: 43
End: 2023-05-09

## 2023-05-09 VITALS
HEIGHT: 76 IN | SYSTOLIC BLOOD PRESSURE: 112 MMHG | DIASTOLIC BLOOD PRESSURE: 72 MMHG | OXYGEN SATURATION: 97 % | TEMPERATURE: 97.9 F | HEART RATE: 73 BPM | BODY MASS INDEX: 31.78 KG/M2 | RESPIRATION RATE: 16 BRPM | WEIGHT: 261 LBS

## 2023-05-09 DIAGNOSIS — E11.9 NEWLY DIAGNOSED DIABETES (HCC): Primary | ICD-10-CM

## 2023-05-09 DIAGNOSIS — M20.42 HAMMER TOES OF BOTH FEET: ICD-10-CM

## 2023-05-09 DIAGNOSIS — M20.41 HAMMER TOES OF BOTH FEET: ICD-10-CM

## 2023-05-09 DIAGNOSIS — I10 ESSENTIAL HYPERTENSION: ICD-10-CM

## 2023-05-09 LAB
CREAT UR-MCNC: 84.8 MG/DL
MICROALBUMIN UR-MCNC: 5.7 MG/L (ref 0–20)
MICROALBUMIN/CREAT 24H UR: 7 MG/G CREATININE (ref 0–30)

## 2023-05-09 RX ORDER — LISINOPRIL 10 MG/1
10 TABLET ORAL DAILY
Qty: 90 TABLET | Refills: 1 | Status: SHIPPED | OUTPATIENT
Start: 2023-05-09

## 2023-05-09 RX ORDER — BLOOD-GLUCOSE METER
1 EACH MISCELLANEOUS 3 TIMES DAILY
Qty: 1 KIT | Refills: 0 | Status: SHIPPED | OUTPATIENT
Start: 2023-05-09

## 2023-05-09 NOTE — PROGRESS NOTES
Name: Marvel Wilson      : 1980      MRN: [id number]  Encounter Provider: Harlee Osgood, MD  Encounter Date: 2023   Encounter department: [address] [place] [address]  Newly diagnosed diabetes ([facility] )  -     Albumin / creatinine urine ratio  -     Blood Glucose Monitoring Suppl (OneTouch Verio) w/Device KIT; Use 1 Device 3 (three) times a day  -     glucose blood test strip; Use 1 each 3 (three) times a day Verio - Use to test 3x daily    2  Essential hypertension  -     lisinopril (ZESTRIL) 10 mg tablet; Take 1 tablet (10 mg total) by mouth daily    3  Essential hypertension  Comments:  Continue amlodipine at 5 mg  increase lisinopril to 10 mg  Orders:  -     lisinopril (ZESTRIL) 10 mg tablet; Take 1 tablet (10 mg total) by mouth daily    4  Hammer toes of both feet       History provided in regards to newly diagnosed diabetes, after discussion with patient, patient elected to not change his current medication in anticipation of starting Ozempic in the future  Encourage patient to continue checking his blood sugar at least 3 times a day, if he notices blood sugars elevated, more than 303 days consecutively please return for evaluation  follow-up with myself and your endocrinologist in 3 months  Continue taking lisinopril for blood pressure         Subjective     HPI     37-year-old male patient presents for follow-up after diagnosis of diabetes in the emergency room setting  Patient reports over the last few months he has noticed that he has increased urinary frequency as well as increased muscle cramping  Initially believes this is secondary to working out more however, when he used the Internet recently symptoms there was concern for possible diabetes diagnosis  When he used his mother's glucometer (he does have family history of type 2 diabetes) it registered as high so he went to the emergency room for evaluation    Patient's hemoglobin A1c at that time was 10 8   Patient was not in diabetic ketoacidosis based on emergency room blood work  He has since follow-up with endocrinologist, reports he is on metformin extended release 500 mg twice a day, has had some diarrhea as a side effect however is tolerating medication  His cramping and diarrhea symptom has significantly improved while on medication  Pt reports his blood sugar has been ranging mostly in the 220s, ranges between 190s-334  Has been checking his blood sugar consistently every breakfast lunch and there  Denies any hypoglycemic symptoms  Reports he does have follow-up with the endocrinologist in August when plan to start [address]  Review of Systems   Constitutional: Negative for chills and fever  HENT: Negative for congestion, rhinorrhea and sore throat  Respiratory: Negative for shortness of breath  Cardiovascular: Negative for chest pain  Gastrointestinal: Negative for abdominal pain  Neurological: Negative for headaches  Psychiatric/Behavioral: Negative for sleep disturbance         Past Medical History:   Diagnosis Date   • Acute bronchiolitis with bronchospasm     LAST ASSESSED: 11/25/16   • COVID-19 10/14/2021   • Hypertension      Past Surgical History:   Procedure Laterality Date   • TONSILLECTOMY  2013    [facility]     Family History   Problem Relation Age of Onset   • Hypertension Mother    • Thyroid nodules Mother    • Ulcerative colitis Mother    • Diabetes unspecified Mother    • Thyroid disease unspecified Mother    • Hypertension Father    • Heart attack Paternal Grandfather 45   • Heart failure Paternal Grandfather    • Heart attack Paternal Uncle    • Coronary artery disease Paternal Uncle         CABG   • Coronary artery disease Paternal Aunt    • Heart attack Paternal Aunt    • Heart disease Maternal Grandfather         CARDIAC DISORDER   • Heart disease Paternal Grandmother         CARDIAC DISORDER   • Hypertension Paternal Grandmother    • Kidney failure Paternal Grandmother         RENAL     Social History     Socioeconomic History   • Marital status: Single     Spouse name: None   • Number of children: None   • Years of education: None   • Highest education level: None   Occupational History   • Occupation:    Tobacco Use   • Smoking status: Former     Packs/day: 0 50     Years: 17 00     Pack years: 8 50     Types: Cigarettes     Quit date: 1/1/2020     Years since quitting: 3 3   • Smokeless tobacco: Never   • Tobacco comments:         Vaping Use   • Vaping Use: Former   Substance and Sexual Activity   • Alcohol use: Yes     Comment: DRINKS APPROX 2 TIMES PER MONTH   • Drug use: No   • Sexual activity: Yes     Partners: Male     Birth control/protection: Condom Male   Other Topics Concern   • None   Social History Narrative    CAFFEINE USE: HE ADMITS TO CONSUMING CAFFEINE VIA COFFEE (1 SERVING PER DAY)    COMPLETED HIGH SCHOOL     Social Determinants of Health     Financial Resource Strain: Not on file   Food Insecurity: Not on file   Transportation Needs: Not on file   Physical Activity: Not on file   Stress: Not on file   Social Connections: Not on file   Intimate Partner Violence: Not on file   Housing Stability: Not on file     Current Outpatient Medications on File Prior to Visit   Medication Sig   • albuterol (PROVENTIL HFA,VENTOLIN HFA) 90 mcg/act inhaler Inhale 2 puffs every 4 (four) hours as needed for wheezing   • amLODIPine (NORVASC) 5 mg tablet TAKE 1 TABLET (5 MG TOTAL) BY MOUTH DAILY     • cholecalciferol (VITAMIN D3) 1,000 units tablet Take 1,000 Units by mouth daily   • cyclobenzaprine (FLEXERIL) 10 mg tablet Take 1 tablet (10 mg total) by mouth daily at bedtime   • fexofenadine (ALLEGRA) 30 MG tablet Take 30 mg by mouth daily   • fluticasone (FLONASE) 50 mcg/act nasal spray 1 spray into each nostril daily   • metFORMIN (GLUCOPHAGE-XR) 500 mg 24 hr tablet Take 1 tablet (500 mg total) by mouth 2 (two) times a "day with meals   • montelukast (SINGULAIR) 10 mg tablet TAKE 1 TABLET BY MOUTH DAILY AT BEDTIME   • Multiple Vitamin (MULTI-DAY VITAMINS) TABS Take by mouth   • [DISCONTINUED] Blood Glucose Monitoring Suppl (OneTouch Verio) w/Device KIT Use 1 Device 3 (three) times a day   • [DISCONTINUED] glucose blood test strip Use 1 each daily as needed Verio - Use to test 3x daily   • [DISCONTINUED] lisinopril (ZESTRIL) 10 mg tablet TAKE 1 TABLET BY MOUTH EVERY DAY   • sulfacetamide (BLEPH-10) 10 % ophthalmic solution Administer 1 drop to the right eye 4 (four) times a day     Allergies   Allergen Reactions   • Cefaclor Hives     Immunization History   Administered Date(s) Administered   • INFLUENZA 10/01/2013, 10/05/2021, 11/16/2022   • Influenza, injectable, quadrivalent, preservative free 0 5 mL 12/04/2019, 11/01/2020   • Influenza, seasonal, injectable 10/01/2013   • Tdap 06/09/2021       Objective     /72 (BP Location: Left arm, Patient Position: Sitting, Cuff Size: Large)   Pulse 73   Temp 97 9 °F (36 6 °C) (Temporal)   Resp 16   Ht 6' 4\" (1 93 m)   Wt 118 kg (261 lb)   SpO2 97%   BMI 31 77 kg/m²     Physical Exam  Vitals reviewed  Constitutional:       General: He is not in acute distress  Appearance: Normal appearance  He is obese  He is not ill-appearing, toxic-appearing or diaphoretic  Cardiovascular:      Rate and Rhythm: Normal rate and regular rhythm  Pulses: no weak pulses          Dorsalis pedis pulses are 1+ on the right side and 1+ on the left side  Heart sounds: Normal heart sounds  No murmur heard  Pulmonary:      Effort: Pulmonary effort is normal  No respiratory distress  Breath sounds: Normal breath sounds  Abdominal:      General: Abdomen is flat  Musculoskeletal:         General: No swelling, tenderness, deformity or signs of injury  Normal range of motion     Feet:      Right foot:      Skin integrity: No ulcer, skin breakdown, erythema, warmth, callus or dry " skin       Left foot:      Skin integrity: No ulcer, skin breakdown, erythema, warmth, callus or dry skin  Skin:     General: Skin is warm and dry  Capillary Refill: Capillary refill takes less than 2 seconds  Coloration: Skin is not jaundiced  Neurological:      General: No focal deficit present  Mental Status: He is alert  Psychiatric:         Mood and Affect: Mood normal         Patient's shoes and socks removed  Right Foot/Ankle   Right Foot Inspection  Skin Exam: skin normal and skin intact  No dry skin, no warmth, no callus, no erythema, no maceration, no abnormal color, no pre-ulcer, no ulcer and no callus  Toe Exam: right toe deformity  No swelling, no tenderness and erythema    Sensory   Vibration: intact  Proprioception: intact  Monofilament testing: intact    Vascular  Capillary refills: < 3 seconds  The right DP pulse is 1+  Left Foot/Ankle  Left Foot Inspection  Skin Exam: skin normal and skin intact  No dry skin, no warmth, no erythema, no maceration, normal color, no pre-ulcer, no ulcer and no callus  Toe Exam: left toe deformity  No swelling, no tenderness and no erythema  Sensory   Vibration: intact  Proprioception: intact  Monofilament testing: intact    Vascular  Capillary refills: < 3 seconds  The left DP pulse is 1+       Assign Risk Category  No deformity present  No loss of protective sensation  No weak pulses  Risk: 0          Cristhian Torres MD

## 2023-05-09 NOTE — PATIENT INSTRUCTIONS
Type 2 Diabetes Management for Adults   AMBULATORY CARE:   Type 2 diabetes  is a disease that affects how your body uses glucose (sugar)  Either your body cannot make enough insulin, or it cannot use the insulin correctly  It is important to keep diabetes controlled to prevent damage to your heart, blood vessels, and other organs  Management will help you feel well and enjoy your daily activities  Your diabetes care team providers can help you make a plan to fit diabetes care into your schedule  Your plan can change over time to fit your needs and your family's needs  Have someone call your local emergency number (911 in the [address]) if:   • You cannot be woken  • You have signs of diabetic ketoacidosis:     ? confusion, fatigue    ? vomiting    ? rapid heartbeat    ? fruity smelling breath    ? extreme thirst    ? dry mouth and skin    • You have any of the following signs of a heart attack:      ? Squeezing, pressure, or pain in your chest    ? You may  also have any of the following:     - Discomfort or pain in your back, neck, jaw, stomach, or arm    - Shortness of breath    - Nausea or vomiting    - Lightheadedness or a sudden cold sweat    • You have any of the following signs of a stroke:      ? Numbness or drooping on one side of your face     ? Weakness in an arm or leg    ? Confusion or difficulty speaking    ? Dizziness, a severe headache, or vision loss    Call your doctor or diabetes care team provider if:   • You have a sore or wound that will not heal     • You have a change in the amount you urinate  • Your blood sugar levels are higher than your target goals  • You often have lower blood sugar levels than your target goals  • Your skin is red, dry, warm, or swollen  • You have trouble coping with diabetes, or you feel anxious or depressed  • You have questions or concerns about your condition or care      What you need to know about high blood sugar levels:  High blood sugar levels may not cause any symptoms  You may feel more thirsty or urinate more often than usual  Over time, high blood sugar levels can damage your nerves, blood vessels, tissues, and organs  The following can increase your blood sugar levels:  • Large meals or large amounts of carbohydrates at one time    • Less physical activity    • Stress    • Illness    • A lower dose of diabetes medicine or insulin, or a late dose    What you need to know about low blood sugar levels:  Symptoms include feeling shaky, dizzy, irritable, or confused  You can prevent symptoms by keeping your blood sugar levels from going too low  • Treat a low blood sugar level right away:      ? Drink 4 ounces of juice or have 1 tube of glucose gel  ? Check your blood sugar level again 10 to 15 minutes later  ? When the level goes back to normal, eat a meal or snack to prevent another decrease  • Keep glucose gel, raisins, or hard candy with you at all times to treat a low blood sugar level  • Your blood sugar level can get too low if you take diabetes medicine or insulin and do not eat enough food  • If you use insulin, check your blood sugar level before you exercise  ? If your blood sugar level is below 100 mg/dL, eat 4 crackers or 2 ounces of raisins, or drink 4 ounces of juice  ? Check your level every 30 minutes if you exercise longer than 1 hour  ? You may need a snack during or after exercise  What you can do to manage your blood sugar levels:   • Check your blood sugar levels as directed and as needed  Several items are available to use to check your levels  You may need to check by testing a drop of blood in a glucose monitor  You may instead be given a continuous glucose monitoring (CGM) device  The device is worn at all times  The CGM checks your blood sugar level every 5 minutes  It sends results to an electronic device such as a smart phone  A CGM can be used with or without an insulin pump   You and your diabetes care team providers will decide on the best method for you  The goal for blood sugar levels before meals  is between 80 and 130 mg/dL and 2 hours after eating  is lower than 180 mg/dL  • Make healthy food choices  Work with a dietitian to develop a meal plan that works for you and your schedule  A dietitian can help you learn how to eat the right amount of carbohydrates during your meals and snacks  Carbohydrates can raise your blood sugar level if you eat too many at one time  Examples of foods that contain carbohydrates are breads, cereals, rice, pasta, and sweets  • Eat high-fiber foods as directed  Fiber helps improve blood sugar levels  Fiber also lowers your risk for heart disease and other problems diabetes can cause  Examples of high-fiber foods include vegetables, whole-grain bread, and beans such as zelaya beans  Your dietitian can tell you how much fiber to have each day  • Get regular physical activity  Physical activity can help you get to your target blood sugar level goal and manage your weight  Get at least 150 minutes of moderate to vigorous aerobic physical activity each week  Do not miss more than 2 days in a row  Do not sit longer than 30 minutes at a time  Your healthcare provider can help you create an activity plan  The plan can include the best activities for you and can help you build your strength and endurance  • Maintain a healthy weight  Ask your team what a healthy weight is for you  A healthy weight can help you control diabetes and prevent heart disease  Ask your team to help you create a weight loss plan, if needed  Weight loss can help make a difference in managing diabetes  Your team will help you set a weight-loss goal, such as 10 to 15 pounds, or 5% of your extra weight  Together you and your team can set manageable weight loss goals  • Take your diabetes medicine or insulin as directed    You may need diabetes medicine, insulin, or both to help control your blood sugar levels  Your healthcare provider will teach you how and when to take your diabetes medicine or insulin  You will also be taught about side effects oral diabetes medicine can cause  Insulin may be injected or given through a pump or pen  You and your providers will decide on the best method for you:    ? An insulin pump  is an implanted device that gives your insulin 24 hours a day  An insulin pump prevents the need for multiple insulin injections in a day  ? An insulin pen  is a device prefilled with the right amount of insulin  ? You and your family members will be taught how to draw up and give insulin  if this is the best method for you  Your providers will also teach you how to dispose of needles and syringes  ? You will learn how much insulin you need  and when to give it  You will be taught when not to give insulin  You will also be taught what to do if your blood sugar level drops too low  This may happen if you take insulin and do not eat the right amount of carbohydrates  More ways to manage type 2 diabetes:   • Wear medical alert identification  Wear medical alert jewelry or carry a card that says you have diabetes  Ask your provider where to get these items  • Do not smoke  Nicotine and other chemicals in cigarettes and cigars can cause lung and blood vessel damage  It also makes it more difficult to manage your diabetes  Ask your provider for information if you currently smoke and need help to quit  Do not use e-cigarettes or smokeless tobacco in place of cigarettes or to help you quit  They still contain nicotine  • Check your feet each day for cuts, scratches, calluses, or other wounds  Look for redness and swelling, and feel for warmth  Wear shoes that fit well  Check your shoes for rocks or other objects that can hurt your feet  Do not walk barefoot or wear shoes without socks   Wear cotton socks to help keep your feet dry  • Ask about vaccines you may need  You have a higher risk for serious illness if you get the flu, pneumonia, COVID-19, or hepatitis  Ask your provider if you should get vaccines to prevent these or other diseases, and when to get the vaccines  • Talk to your provider if you become stressed about diabetes care  Sometimes being able to fit diabetes care into your life can cause increased stress  The stress can cause you not to take care of yourself properly  Your care team providers can help by offering tips about self-care  Your providers may suggest you talk to a mental health provider who can listen and offer help with self-care issues  • Have your A1c checked as directed  Your provider may check your A1c every 3 months, or 2 times each year if your diabetes is controlled  An A1c test shows the average amount of sugar in your blood over the past 2 to 3 months  Your provider will tell you what your A1c level should be  • Have screening tests as directed  Your provider may recommend screening for complications of diabetes and other conditions that may develop  Some screenings may begin right away and some may happen within the first 5 years of diagnosis:    ? Examples of diabetes complications  include kidney problems, high cholesterol, high blood pressure, blood vessel problems, eye problems, and sleep apnea  ? You may be screened for a low vitamin B level  if you take oral diabetes medicine for a long time  ? Women of childbearing years may be screened  for polycystic ovarian syndrome (PCOS)  Follow up with your doctor or diabetes care team providers as directed: You may need to have blood tests done before your follow-up visit  The test results will show if changes need to be made in your treatment or self-care  Talk to your provider if you cannot afford your medicine  Write down your questions so you remember to ask them during your visits    © Copyright [employer] 2022 Information is for End User's use only and may not be sold, redistributed or otherwise used for commercial purposes  The above information is an  only  It is not intended as medical advice for individual conditions or treatments  Talk to your doctor, nurse or pharmacist before following any medical regimen to see if it is safe and effective for you

## 2023-05-10 ENCOUNTER — TELEPHONE (OUTPATIENT)
Dept: ENDOCRINOLOGY | Facility: CLINIC | Age: 43
End: 2023-05-10

## 2023-05-10 NOTE — TELEPHONE ENCOUNTER
[facility] a [facility] for Pt's Metformin, Previously send to [address] [place] [address] I did call pt left message requesting a call back to see if he wants us to cancel his RX at [facility] and sent a new RX to his mail order

## 2023-05-11 DIAGNOSIS — E11.9 DIABETES MELLITUS, NEW ONSET (HCC): ICD-10-CM

## 2023-05-11 RX ORDER — METFORMIN HYDROCHLORIDE 500 MG/1
500 TABLET, EXTENDED RELEASE ORAL 2 TIMES DAILY WITH MEALS
Qty: 180 TABLET | Refills: 1 | Status: SHIPPED | OUTPATIENT
Start: 2023-05-11

## 2023-05-25 ENCOUNTER — OFFICE VISIT (OUTPATIENT)
Dept: PODIATRY | Facility: CLINIC | Age: 43
End: 2023-05-25

## 2023-05-25 VITALS
HEART RATE: 73 BPM | BODY MASS INDEX: 31.68 KG/M2 | DIASTOLIC BLOOD PRESSURE: 90 MMHG | SYSTOLIC BLOOD PRESSURE: 135 MMHG | WEIGHT: 260.14 LBS | HEIGHT: 76 IN

## 2023-05-25 DIAGNOSIS — E11.9 ENCOUNTER FOR COMPREHENSIVE DIABETIC FOOT EXAMINATION, TYPE 2 DIABETES MELLITUS (HCC): ICD-10-CM

## 2023-05-25 DIAGNOSIS — E11.9 NEWLY DIAGNOSED DIABETES (HCC): Primary | ICD-10-CM

## 2023-05-25 NOTE — PATIENT INSTRUCTIONS
Foot Care for People with Diabetes   WHAT YOU NEED TO KNOW:   Long-term high blood sugar levels can damage the blood vessels and nerves in your legs and feet  This damage makes it hard to feel pressure, pain, temperature, and touch  You may not be able to feel a cut or sore, or shoes that are too tight  Foot care is needed to prevent serious problems, such as an infection or amputation  Diabetes may cause your toes to become crooked or curved under  These changes may affect the way you walk and can lead to increased pressure on your foot  The pressure can decrease blood flow to your feet  Lack of blood flow increases your risk for a foot ulcer  DISCHARGE INSTRUCTIONS:   Call your care team provider if:   Your feet become numb, weak, or hard to move  You have pus draining from a sore on your foot  You have a wound on your foot that gets bigger, deeper, or does not heal     You see blisters, cuts, scratches, calluses, or sores on your foot  You have a fever, and your feet become red, warm, and swollen  Your toenails become thick, curled, or yellow  You find it hard to check your feet because your vision is poor  You have questions or concerns about your condition or care  Foot care:   Check your feet each day  Look at your whole foot, including the bottom, and between and under your toes  Check for wounds, corns, and calluses  Use a mirror to see the bottom of your feet  The skin on your feet may be shiny, tight, or darker than normal  Your feet may also be cold and pale  Feel your feet by running your hands along the tops, bottoms, sides, and between your toes  Redness, swelling, and warmth are signs of blood flow problems that can lead to a foot ulcer  Do not try to remove corns or calluses yourself  Do not ignore small problems, such as dry skin or small wounds  These can become life-threatening over time without proper care  Wash your feet each day with soap and warm water    Do not use hot water, because this can injure your foot  Dry your feet gently with a towel after you wash them  Dry between and under your toes  Apply lotion or a moisturizer on your dry feet  Ask your care team provider what lotions are best to use  Do not put lotion or moisturizer between your toes  Moisture between your toes could lead to skin breakdown  Cut your toenails correctly  File or cut your toenails straight across  Use a soft brush to clean around your toenails  If your toenails are very thick, you may need to have a care team provider or specialist cut them  Protect your feet  Do not walk barefoot or wear your shoes without socks  Check your shoes for rocks or other objects that can hurt your feet  Wear cotton socks to help keep your feet dry  Wear socks without toe seams, or wear them with the seams inside out  Change your socks each day  Do not wear socks that are dirty or damp  Wear shoes that fit well  Wear shoes that do not rub against any area of your feet  Your shoes should be ½ to ¾ inch (1 to 2 centimeters) longer than your feet  Your shoes should also have extra space around the widest part of your feet  Walking or athletic shoes with laces or straps that adjust are best  Ask your care team provider for help to choose shoes that fit you best  Ask your provider if you need to wear an insert, orthotic, or bandage on your feet  Do not smoke  Smoking can damage your blood vessels and put you at increased risk for foot ulcers  Ask your care team provider for information if you currently smoke and need help to quit  E-cigarettes or smokeless tobacco still contain nicotine  Talk to your care team provider before you use these products  Follow up with your diabetes care team provider or foot specialist as directed: You will need to have your feet checked at least 1 time each year  You may need a foot exam more often if you have nerve damage, foot deformities, or ulcers   Write down your questions so you remember to ask them during your visits  © [employer] 2022 Information is for End User's use only and may not be sold, redistributed or otherwise used for commercial purposes  The above information is an  only  It is not intended as medical advice for individual conditions or treatments  Talk to your doctor, nurse or pharmacist before following any medical regimen to see if it is safe and effective for you  2.02

## 2023-05-25 NOTE — PROGRESS NOTES
"Assessment/Plan:       Diagnoses and all orders for this visit:    Newly diagnosed diabetes ([facility] )    Encounter for comprehensive diabetic foot examination, type 2 diabetes mellitus ([facility] )      Diagnosis and options discussed with patient  Patient agreeable to the plan as stated below  -DM foot risk is low (minimal decreased vibration)   Recommend annual DM foot care    -Discussed DM risk to lower extremities, proper shoe gear, and daily monitoring of feet    -Discussed weight loss and suitable exercise regiment  -Reviewed most recent PCP visit on 5/9/2023  -Reviewed endocrine visit 5/2/2023  -Educated on A1C and the risks of poorly controlled Diabetes  Reviewed recent A1C:  Lab Results   Component Value Date    HGBA1C 10 9 (H) 04/27/2023     Subjective:      Patient ID: Marce Gilliam is a 41 y o  male  Patient presents for DM foot check  He was recently diagnosed with DM2, A1C over 10  HE has already begun treatment with endocrinology and taking metformin  He denies any pedal complaints but want to get the feet checked  He does report some dry cracked skin in his heels  The following portions of the patient's history were reviewed and updated as appropriate: allergies, current medications, past family history, past medical history, past social history, past surgical history and problem list   Review of Systems    Constitutional: Negative  Respiratory: Negative for cough and shortness of breath  Gastrointestinal: Negative for diarrhea, nausea and vomiting  Musculoskeletal: Negative for arthralgias, gait problem, joint swelling and myalgias  Skin: Negative for rash or wound  Neurological: Negative for weakness, numbness and headaches  Objective:      /90   Pulse 73   Ht 6' 4\" (1 93 m)   Wt 118 kg (260 lb 2 3 oz)   BMI 31 67 kg/m²          Physical Exam  Vitals reviewed  Constitutional:       Appearance: He is not ill-appearing or diaphoretic     Cardiovascular:      " Rate and Rhythm: Normal rate  Pulses: Normal pulses  no weak pulses          Dorsalis pedis pulses are 2+ on the right side and 2+ on the left side  Posterior tibial pulses are 2+ on the right side and 2+ on the left side  Pulmonary:      Effort: Pulmonary effort is normal  No respiratory distress  Musculoskeletal:      Right foot: Deformity (semireducible hammertoe 2,3,4) present  Left foot: Deformity present  Feet:      Right foot:      Protective Sensation: 10 sites tested  10 sites sensed  Skin integrity: Dry skin present  No ulcer, skin breakdown or erythema  Toenail Condition: Right toenails are normal       Left foot:      Protective Sensation: 10 sites tested  10 sites sensed  Skin integrity: Dry skin present  No ulcer, skin breakdown or erythema  Toenail Condition: Left toenails are normal    Skin:     Findings: No erythema  Neurological:      Mental Status: He is alert and oriented to person, place, and time  Psychiatric:         Mood and Affect: Mood normal        Diabetic Foot Exam    Patient's shoes and socks removed  Right Foot/Ankle   Right Foot Inspection  Skin Exam: skin intact and dry skin  No erythema, no maceration and no ulcer  Toe Exam: ROM and strength within normal limits and right toe deformity  Sensory   Vibration: diminished  Proprioception: intact  Monofilament testing: intact    Vascular  Capillary refills: < 3 seconds  The right DP pulse is 2+  The right PT pulse is 2+  Right Toe  - Comprehensive Exam  Arch: normal  Hammertoes: second toe, third toe and fourth toe      Left Foot/Ankle  Left Foot Inspection  Skin Exam: skin intact and dry skin  No erythema, no maceration and no ulcer  Toe Exam: ROM and strength within normal limits and left toe deformity  Sensory   Vibration: diminished  Proprioception: intact  Monofilament testing: intact    Vascular  Capillary refills: < 3 seconds  The left DP pulse is 2+   The left PT pulse is 2+       Left Toe  - Comprehensive Exam  Arch: normal  Hammertoes: second toe, fourth toe and third toe      Assign Risk Category  Deformity present  No loss of protective sensation  No weak pulses  Risk: 0

## 2023-06-03 LAB
LEFT EYE DIABETIC RETINOPATHY: NORMAL
RIGHT EYE DIABETIC RETINOPATHY: NORMAL

## 2023-06-18 ENCOUNTER — APPOINTMENT (OUTPATIENT)
Dept: LAB | Age: 43
End: 2023-06-18
Payer: COMMERCIAL

## 2023-06-18 DIAGNOSIS — E11.9 DIABETES MELLITUS, NEW ONSET (HCC): ICD-10-CM

## 2023-06-18 LAB
ALBUMIN SERPL BCP-MCNC: 3.8 G/DL (ref 3.5–5)
ALP SERPL-CCNC: 68 U/L (ref 46–116)
ALT SERPL W P-5'-P-CCNC: 145 U/L (ref 12–78)
ANION GAP SERPL CALCULATED.3IONS-SCNC: -3 MMOL/L (ref 4–13)
AST SERPL W P-5'-P-CCNC: 69 U/L (ref 5–45)
BILIRUB SERPL-MCNC: 0.39 MG/DL (ref 0.2–1)
BUN SERPL-MCNC: 12 MG/DL (ref 5–25)
CALCIUM SERPL-MCNC: 10.6 MG/DL (ref 8.3–10.1)
CHLORIDE SERPL-SCNC: 112 MMOL/L (ref 96–108)
CHOLEST SERPL-MCNC: 198 MG/DL
CO2 SERPL-SCNC: 28 MMOL/L (ref 21–32)
CREAT SERPL-MCNC: 0.93 MG/DL (ref 0.6–1.3)
CREAT UR-MCNC: 122 MG/DL
GFR SERPL CREATININE-BSD FRML MDRD: 101 ML/MIN/1.73SQ M
GLUCOSE P FAST SERPL-MCNC: 120 MG/DL (ref 65–99)
HDLC SERPL-MCNC: 30 MG/DL
LDLC SERPL CALC-MCNC: 137 MG/DL (ref 0–100)
MICROALBUMIN UR-MCNC: 5.1 MG/L (ref 0–20)
MICROALBUMIN/CREAT 24H UR: 4 MG/G CREATININE (ref 0–30)
NONHDLC SERPL-MCNC: 168 MG/DL
POTASSIUM SERPL-SCNC: 4.3 MMOL/L (ref 3.5–5.3)
PROT SERPL-MCNC: 7.2 G/DL (ref 6.4–8.4)
SODIUM SERPL-SCNC: 137 MMOL/L (ref 135–147)
TRIGL SERPL-MCNC: 155 MG/DL

## 2023-06-18 PROCEDURE — 82570 ASSAY OF URINE CREATININE: CPT

## 2023-06-18 PROCEDURE — 36415 COLL VENOUS BLD VENIPUNCTURE: CPT

## 2023-06-18 PROCEDURE — 80061 LIPID PANEL: CPT

## 2023-06-18 PROCEDURE — 80053 COMPREHEN METABOLIC PANEL: CPT

## 2023-06-18 PROCEDURE — 82043 UR ALBUMIN QUANTITATIVE: CPT

## 2023-06-20 ENCOUNTER — OFFICE VISIT (OUTPATIENT)
Dept: FAMILY MEDICINE CLINIC | Facility: CLINIC | Age: 43
End: 2023-06-20
Payer: COMMERCIAL

## 2023-06-20 VITALS
BODY MASS INDEX: 28.79 KG/M2 | OXYGEN SATURATION: 98 % | HEIGHT: 76 IN | WEIGHT: 236.4 LBS | HEART RATE: 78 BPM | SYSTOLIC BLOOD PRESSURE: 128 MMHG | DIASTOLIC BLOOD PRESSURE: 82 MMHG | TEMPERATURE: 97.6 F

## 2023-06-20 DIAGNOSIS — H10.33 ACUTE CONJUNCTIVITIS OF BOTH EYES, UNSPECIFIED ACUTE CONJUNCTIVITIS TYPE: Primary | ICD-10-CM

## 2023-06-20 PROCEDURE — 99213 OFFICE O/P EST LOW 20 MIN: CPT | Performed by: PHYSICIAN ASSISTANT

## 2023-06-20 RX ORDER — MOXIFLOXACIN 5 MG/ML
1 SOLUTION/ DROPS OPHTHALMIC 3 TIMES DAILY
Qty: 3 ML | Refills: 0 | Status: SHIPPED | OUTPATIENT
Start: 2023-06-20

## 2023-06-20 NOTE — PROGRESS NOTES
Assessment/Plan:     Diagnoses and all orders for this visit:    Acute conjunctivitis of both eyes, unspecified acute conjunctivitis type  -     moxifloxacin (VIGAMOX) 0 5 % ophthalmic solution; Administer 1 drop to both eyes 3 (three) times a day          Subjective:      Patient ID: Giorgio Mann is a 41 y o  male  Patient presents in the office with bilateral red eyes  Patient states it started over the weekend  He has a runny nose  Eyes are red  He has a clear discharge  The following portions of the patient's history were reviewed and updated as appropriate:   He   Patient Active Problem List    Diagnosis Date Noted   • Newly diagnosed diabetes ([facility] ) 05/09/2023   • Hammer toes of both feet 05/09/2023   • Annual physical exam 06/16/2022   • Seasonal allergies 04/26/2022   • Chronic rhinitis 12/19/2019   • Fatty liver 02/12/2018   • Essential hypertension 06/23/2017   • Degenerative lumbar disc 02/06/2017   • Abnormal LFTs 01/13/2017   • Vitamin D deficiency 01/13/2017   • BMI 36 0-36 9,adult 04/14/2015     Current Outpatient Medications   Medication Sig Dispense Refill   • albuterol (PROVENTIL HFA,VENTOLIN HFA) 90 mcg/act inhaler Inhale 2 puffs every 4 (four) hours as needed for wheezing 6 7 Inhaler 0   • amLODIPine (NORVASC) 5 mg tablet TAKE 1 TABLET (5 MG TOTAL) BY MOUTH DAILY   90 tablet 2   • Blood Glucose Monitoring Suppl (OneTouch Verio) w/Device KIT Use 1 Device 3 (three) times a day 1 kit 0   • cholecalciferol (VITAMIN D3) 1,000 units tablet Take 1,000 Units by mouth daily     • cyclobenzaprine (FLEXERIL) 10 mg tablet Take 1 tablet (10 mg total) by mouth daily at bedtime 15 tablet 1   • fexofenadine (ALLEGRA) 30 MG tablet Take 30 mg by mouth daily     • fluticasone (FLONASE) 50 mcg/act nasal spray 1 spray into each nostril daily     • glucose blood test strip Use 1 each 3 (three) times a day Verio - Use to test 3x daily 300 each 1   • lisinopril (ZESTRIL) 10 mg tablet Take 1 tablet (10 mg total) by mouth daily 90 tablet 1   • metFORMIN (GLUCOPHAGE-XR) 500 mg 24 hr tablet Take 1 tablet (500 mg total) by mouth 2 (two) times a day with meals 180 tablet 1   • montelukast (SINGULAIR) 10 mg tablet TAKE 1 TABLET BY MOUTH DAILY AT BEDTIME 90 tablet 1   • moxifloxacin (VIGAMOX) 0 5 % ophthalmic solution Administer 1 drop to both eyes 3 (three) times a day 3 mL 0   • Multiple Vitamin (MULTI-DAY VITAMINS) TABS Take by mouth       No current facility-administered medications for this visit  He is allergic to cefaclor       Review of Systems   Constitutional: Negative for activity change, appetite change, chills, fatigue and fever  HENT: Positive for rhinorrhea  Negative for ear pain, postnasal drip, sinus pressure, sinus pain, sneezing and sore throat  Eyes: Positive for discharge and redness  Respiratory: Negative for cough  Objective:        Physical Exam  Vitals and nursing note reviewed  Constitutional:       General: He is not in acute distress  Appearance: He is well-developed  He is obese  He is not diaphoretic  HENT:      Head: Normocephalic and atraumatic  Right Ear: Tympanic membrane, ear canal and external ear normal       Left Ear: Tympanic membrane, ear canal and external ear normal       Nose: No rhinorrhea  Right Sinus: No maxillary sinus tenderness or frontal sinus tenderness  Left Sinus: No maxillary sinus tenderness or frontal sinus tenderness  Mouth/Throat:      Pharynx: No oropharyngeal exudate or posterior oropharyngeal erythema  Eyes:      General:         Right eye: Discharge present  Left eye: Discharge present  Conjunctiva/sclera:      Right eye: Right conjunctiva is injected  Left eye: Left conjunctiva is injected  Pupils: Pupils are equal, round, and reactive to light  Cardiovascular:      Rate and Rhythm: Normal rate and regular rhythm  Heart sounds: Normal heart sounds  No murmur heard    Pulmonary: Effort: Pulmonary effort is normal  No respiratory distress  Breath sounds: Normal breath sounds  No wheezing or rales  Lymphadenopathy:      Cervical: No cervical adenopathy  Neurological:      General: No focal deficit present  Mental Status: He is alert and oriented to person, place, and time  Psychiatric:         Mood and Affect: Mood normal          Behavior: Behavior normal          Thought Content:  Thought content normal          Judgment: Judgment normal

## 2023-08-01 ENCOUNTER — OFFICE VISIT (OUTPATIENT)
Dept: FAMILY MEDICINE CLINIC | Facility: CLINIC | Age: 43
End: 2023-08-01
Payer: COMMERCIAL

## 2023-08-01 VITALS
TEMPERATURE: 97.6 F | WEIGHT: 260 LBS | SYSTOLIC BLOOD PRESSURE: 118 MMHG | DIASTOLIC BLOOD PRESSURE: 68 MMHG | HEIGHT: 76 IN | HEART RATE: 86 BPM | BODY MASS INDEX: 31.66 KG/M2 | OXYGEN SATURATION: 100 %

## 2023-08-01 DIAGNOSIS — R59.0 INGUINAL LYMPHADENOPATHY: ICD-10-CM

## 2023-08-01 DIAGNOSIS — E11.9 TYPE 2 DIABETES MELLITUS WITHOUT COMPLICATION, WITHOUT LONG-TERM CURRENT USE OF INSULIN (HCC): Primary | ICD-10-CM

## 2023-08-01 DIAGNOSIS — R30.0 DYSURIA: ICD-10-CM

## 2023-08-01 LAB — SL AMB POCT HEMOGLOBIN AIC: 6.3 (ref ?–6.5)

## 2023-08-01 PROCEDURE — 99214 OFFICE O/P EST MOD 30 MIN: CPT | Performed by: FAMILY MEDICINE

## 2023-08-01 PROCEDURE — 83036 HEMOGLOBIN GLYCOSYLATED A1C: CPT | Performed by: FAMILY MEDICINE

## 2023-08-01 NOTE — PROGRESS NOTES
Name: Veronica Salvage      : 1980      MRN: [id number]  Encounter Provider: Sharyle Im, MD  Encounter Date: 2023   Encounter department: [facility]     1. Type 2 diabetes mellitus without complication, without long-term current use of insulin ([facility])  -     POCT hemoglobin A1c    2. Inguinal lymphadenopathy  -     US scrotum and groin area; Future; Expected date: 2023    3. Dysuria  -     UA w Reflex to Microscopic w Reflex to Culture -Lab Collect; Future; Expected date: 2023      Significant improvement in patient's diabetes, hemoglobin A1c down from 10.9-6.3  Please continue checking her blood sugar at least twice a day and taking your current medication  If you have significant symptoms of weakness, nausea, lightheadedness with low blood sugar this may be a episode of hypoglycemic attack, please take a juice or snack to elevated blood sugar at that time    Incidental finding of inguinal nodule on the left, will obtain ultrasound for evaluation    BMI Counseling: Body mass index is 31.65 kg/m². The BMI is above normal. Nutrition recommendations include reducing portion sizes, 3-5 servings of fruits/vegetables daily and consuming healthier snacks. Exercise recommendations include moderate aerobic physical activity for 150 minutes/week. Subjective     HPI     46-year-old male patient presents for evaluation regarding incidental finding of a lump on his groin. Patient denies any history of similar symptoms, first discovered this yesterday when he was showering. Patient felt solid mildly tender nodule in the crease of his left groin. Most recent CBC from 2023 does not demonstrate any significant lymphocytosis. Patient also has new onset diabetes, most recent hemoglobin A1c from 2023 is 10.9. Patient is currently taking metformin extended release 500 mg twice a day.   Pt does check his own blood sugar, sugar has been ranging between . Globin A1c today is 6.3    Pt cut out all sugar in his diet. Review of Systems   Constitutional: Negative for chills, fatigue and fever. HENT: Negative for congestion, rhinorrhea and sore throat. Respiratory: Negative for shortness of breath. Cardiovascular: Negative for chest pain. Gastrointestinal: Positive for diarrhea. Negative for abdominal pain (discomfort), constipation, nausea and vomiting. Genitourinary: Positive for dysuria (lasting 1 day, resolved. 2 days ago). Negative for hematuria. Neurological: Negative for headaches.        Past Medical History:   Diagnosis Date   • Acute bronchiolitis with bronchospasm     LAST ASSESSED: 11/25/16   • COVID-19 10/14/2021   • Hypertension      Past Surgical History:   Procedure Laterality Date   • TONSILLECTOMY  2013    [facility]     Family History   Problem Relation Age of Onset   • Hypertension Mother    • Thyroid nodules Mother    • Ulcerative colitis Mother    • Diabetes unspecified Mother    • Thyroid disease unspecified Mother    • Hypertension Father    • Heart attack Paternal Grandfather 45   • Heart failure Paternal Grandfather    • Heart attack Paternal Uncle    • Coronary artery disease Paternal Uncle         CABG   • Coronary artery disease Paternal Aunt    • Heart attack Paternal Aunt    • Heart disease Maternal Grandfather         CARDIAC DISORDER   • Heart disease Paternal Grandmother         CARDIAC DISORDER   • Hypertension Paternal Grandmother    • Kidney failure Paternal Grandmother         RENAL     Social History     Socioeconomic History   • Marital status: Single     Spouse name: None   • Number of children: None   • Years of education: None   • Highest education level: None   Occupational History   • Occupation:    Tobacco Use   • Smoking status: Former     Packs/day: 0.50     Years: 17.00     Total pack years: 8.50     Types: Cigarettes     Quit date: 1/1/2020 Years since quitting: 3.5   • Smokeless tobacco: Never   • Tobacco comments:         Vaping Use   • Vaping Use: Former   Substance and Sexual Activity   • Alcohol use: Yes     Comment: DRINKS APPROX 2 TIMES PER MONTH   • Drug use: No   • Sexual activity: Yes     Partners: Male     Birth control/protection: Condom Male   Other Topics Concern   • None   Social History Narrative    CAFFEINE USE: HE ADMITS TO CONSUMING CAFFEINE VIA COFFEE (1 SERVING PER DAY)    COMPLETED HIGH SCHOOL     Social Determinants of Health     Financial Resource Strain: Not on file   Food Insecurity: Not on file   Transportation Needs: Not on file   Physical Activity: Not on file   Stress: Not on file   Social Connections: Not on file   Intimate Partner Violence: Not on file   Housing Stability: Not on file     Current Outpatient Medications on File Prior to Visit   Medication Sig   • albuterol (PROVENTIL HFA,VENTOLIN HFA) 90 mcg/act inhaler Inhale 2 puffs every 4 (four) hours as needed for wheezing   • amLODIPine (NORVASC) 5 mg tablet TAKE 1 TABLET (5 MG TOTAL) BY MOUTH DAILY.    • Blood Glucose Monitoring Suppl (OneTouch Verio) w/Device KIT Use 1 Device 3 (three) times a day   • cholecalciferol (VITAMIN D3) 1,000 units tablet Take 1,000 Units by mouth daily   • cyclobenzaprine (FLEXERIL) 10 mg tablet Take 1 tablet (10 mg total) by mouth daily at bedtime   • fexofenadine (ALLEGRA) 30 MG tablet Take 30 mg by mouth daily   • fluticasone (FLONASE) 50 mcg/act nasal spray 1 spray into each nostril daily   • glucose blood test strip Use 1 each 3 (three) times a day Verio - Use to test 3x daily   • lisinopril (ZESTRIL) 10 mg tablet Take 1 tablet (10 mg total) by mouth daily   • metFORMIN (GLUCOPHAGE-XR) 500 mg 24 hr tablet Take 1 tablet (500 mg total) by mouth 2 (two) times a day with meals   • montelukast (SINGULAIR) 10 mg tablet TAKE 1 TABLET BY MOUTH DAILY AT BEDTIME   • Multiple Vitamin (MULTI-DAY VITAMINS) TABS Take by mouth   • moxifloxacin (VIGAMOX) 0.5 % ophthalmic solution Administer 1 drop to both eyes 3 (three) times a day (Patient not taking: Reported on 8/1/2023)     Allergies   Allergen Reactions   • Cefaclor Hives     Immunization History   Administered Date(s) Administered   • INFLUENZA 10/01/2013, 10/05/2021, 11/16/2022   • Influenza, injectable, quadrivalent, preservative free 0.5 mL 12/04/2019, 11/01/2020   • Influenza, seasonal, injectable 10/01/2013   • Tdap 06/09/2021       Objective     /68 (BP Location: Right arm, Patient Position: Sitting, Cuff Size: Large)   Pulse 86   Temp 97.6 °F (36.4 °C)   Ht 6' 4" (1.93 m)   Wt 118 kg (260 lb)   SpO2 100%   BMI 31.65 kg/m²     Physical Exam  Vitals reviewed. Constitutional:       General: He is not in acute distress. Appearance: Normal appearance. He is obese. He is not ill-appearing, toxic-appearing or diaphoretic. Cardiovascular:      Rate and Rhythm: Normal rate. Pulses: Normal pulses. Pulmonary:      Effort: Pulmonary effort is normal.   Abdominal:      General: Abdomen is flat. Musculoskeletal:         General: No swelling, tenderness, deformity or signs of injury. Skin:     General: Skin is warm and dry. Capillary Refill: Capillary refill takes less than 2 seconds. Coloration: Skin is not jaundiced. Comments: There is a firm, tender, mobile nodule, roughly 1 cm in diameter located at patient's left inguinal area  Nodule feel to be deeper than subcutaneous level   Neurological:      General: No focal deficit present. Mental Status: He is alert and oriented to person, place, and time.    Psychiatric:         Mood and Affect: Mood normal.       Beltran Hu MD

## 2023-08-02 ENCOUNTER — APPOINTMENT (OUTPATIENT)
Dept: LAB | Age: 43
End: 2023-08-02
Payer: COMMERCIAL

## 2023-08-02 DIAGNOSIS — R30.0 DYSURIA: ICD-10-CM

## 2023-08-02 LAB
BILIRUB UR QL STRIP: NEGATIVE
CLARITY UR: CLEAR
COLOR UR: YELLOW
GLUCOSE UR STRIP-MCNC: NEGATIVE MG/DL
HGB UR QL STRIP.AUTO: NEGATIVE
KETONES UR STRIP-MCNC: NEGATIVE MG/DL
LEUKOCYTE ESTERASE UR QL STRIP: NEGATIVE
NITRITE UR QL STRIP: NEGATIVE
PH UR STRIP.AUTO: 6 [PH]
PROT UR STRIP-MCNC: NEGATIVE MG/DL
SP GR UR STRIP.AUTO: 1.01 (ref 1–1.03)
UROBILINOGEN UR STRIP-ACNC: 2 MG/DL

## 2023-08-02 PROCEDURE — 81003 URINALYSIS AUTO W/O SCOPE: CPT

## 2023-08-04 ENCOUNTER — HOSPITAL ENCOUNTER (OUTPATIENT)
Dept: RADIOLOGY | Facility: IMAGING CENTER | Age: 43
End: 2023-08-04
Payer: COMMERCIAL

## 2023-08-04 DIAGNOSIS — R59.0 INGUINAL LYMPHADENOPATHY: ICD-10-CM

## 2023-08-04 PROCEDURE — 76705 ECHO EXAM OF ABDOMEN: CPT

## 2023-08-07 ENCOUNTER — TELEPHONE (OUTPATIENT)
Dept: FAMILY MEDICINE CLINIC | Facility: CLINIC | Age: 43
End: 2023-08-07

## 2023-08-07 DIAGNOSIS — R59.0 LYMPHADENOPATHY, INGUINAL: Primary | ICD-10-CM

## 2023-08-07 RX ORDER — DOXYCYCLINE HYCLATE 100 MG/1
100 CAPSULE ORAL EVERY 12 HOURS SCHEDULED
Qty: 20 CAPSULE | Refills: 0 | Status: SHIPPED | OUTPATIENT
Start: 2023-08-07 | End: 2023-08-17

## 2023-08-07 NOTE — TELEPHONE ENCOUNTER
Pt was called and advised of the findings of resent [address] from 8/4/23. Was advised antibiotic was sent to pharm and that pt needs to be seen in office after antibiotic completed. Pt has appt scheduled for 8/17/23.

## 2023-08-10 ENCOUNTER — OFFICE VISIT (OUTPATIENT)
Dept: ENDOCRINOLOGY | Facility: CLINIC | Age: 43
End: 2023-08-10
Payer: COMMERCIAL

## 2023-08-10 VITALS
HEIGHT: 76 IN | DIASTOLIC BLOOD PRESSURE: 72 MMHG | HEART RATE: 80 BPM | BODY MASS INDEX: 32.15 KG/M2 | WEIGHT: 264 LBS | SYSTOLIC BLOOD PRESSURE: 118 MMHG

## 2023-08-10 DIAGNOSIS — E83.52 HYPERCALCEMIA: ICD-10-CM

## 2023-08-10 DIAGNOSIS — K76.0 FATTY LIVER: ICD-10-CM

## 2023-08-10 DIAGNOSIS — E66.9 CLASS 1 OBESITY WITH BODY MASS INDEX (BMI) OF 32.0 TO 32.9 IN ADULT, UNSPECIFIED OBESITY TYPE, UNSPECIFIED WHETHER SERIOUS COMORBIDITY PRESENT: ICD-10-CM

## 2023-08-10 DIAGNOSIS — E55.9 VITAMIN D DEFICIENCY: ICD-10-CM

## 2023-08-10 DIAGNOSIS — E11.9 NEWLY DIAGNOSED DIABETES (HCC): Primary | ICD-10-CM

## 2023-08-10 DIAGNOSIS — I10 ESSENTIAL HYPERTENSION: ICD-10-CM

## 2023-08-10 PROBLEM — E66.811 CLASS 1 OBESITY WITH BODY MASS INDEX (BMI) OF 32.0 TO 32.9 IN ADULT: Status: ACTIVE | Noted: 2023-08-10

## 2023-08-10 PROCEDURE — 99214 OFFICE O/P EST MOD 30 MIN: CPT

## 2023-08-10 NOTE — PROGRESS NOTES
Established Patient Progress Note    CC: Follow up for type 2 diabetes mellitus    Impression & Plan:    Problem List Items Addressed This Visit        Digestive    Fatty liver     Continue with lifestyle modifications         Relevant Orders    Lipid panel- Lab Collect       Endocrine    Newly diagnosed diabetes ([address]) - Primary     Greatly improved from prior  - continue with current regimen and lifestyle modifications    Lab Results   Component Value Date    HGBA1C 6.3 08/01/2023            Relevant Orders    HEMOGLOBIN A1C W/ EAG ESTIMATION Lab Collect    Lipid panel- Lab Collect       Cardiovascular and Mediastinum    Essential hypertension     Well controlled  - continue current regimen         Relevant Orders    HEMOGLOBIN A1C W/ EAG ESTIMATION Lab Collect       Other    Vitamin D deficiency    Relevant Orders    PTH, intact- Lab Collect    Calcium Lab Collect    Albumin Lab Collect    Vitamin D 25 hydroxy Lab Collect    Class 1 obesity with body mass index (BMI) of 32.0 to 32.9 in adult     Continue with lifestyle modifications         Relevant Orders    HEMOGLOBIN A1C W/ EAG ESTIMATION Lab Collect    Lipid panel- Lab Collect    Hypercalcemia     Elevated calcium on 2 occasions found on lab work. - check PTH, vitamin D          Relevant Orders    PTH, intact- Lab Collect    Calcium Lab Collect    Albumin Lab Collect    Vitamin D 25 hydroxy Lab Collect       Orders Placed This Encounter   Procedures   • HEMOGLOBIN A1C W/ EAG ESTIMATION Lab Collect     Standing Status:   Future     Standing Expiration Date:   8/10/2024   • Lipid panel- Lab Collect     This is a patient instruction: This test requires patient fasting for 10-12 hours or longer. Drinking of black coffee or black tea is acceptable.      Standing Status:   Future     Standing Expiration Date:   8/10/2024   • PTH, intact- Lab Collect     Standing Status:   Future     Standing Expiration Date:   8/10/2024   • Calcium Lab Collect     Standing Status: Future     Standing Expiration Date:   8/10/2024   • Albumin Lab Collect     Standing Status:   Future     Standing Expiration Date:   8/10/2024   • Vitamin D 25 hydroxy Lab Collect     Standing Status:   Future     Standing Expiration Date:   8/10/2024       History of Present Illness:   Angel Bolanos is a 41 y.o. male with a history of type 2 diabetes, hypertension, fatty liver, obesity. Complications:  none. Last A1C was 6.3. Home glucose monitoring: home glucose meter    Hypoglycemia: no  Recent hospitalizations or illnesses: no   Problems with current regimen: no    Blood sugars range from 110's to 130's at home    Has groin infection. Currently being treated with antibiotics    Hypercalcemia: does not take Tums or other calcium supplements. Has 1-2 servings of cheese/day. Does not have other intake of calcium. Used to take 1,000 IU vitamin D supplement. He stopped this 3 months ago when he was diagnosed with diabetes.       Current regimen:   Metformin 500 mg BID    Last Eye Exam: UTD, 6/3/23  Last Foot Exam: UTD, 5/25/23    ACE/ARB: lisinopril     Patient Active Problem List   Diagnosis   • Abnormal LFTs   • Essential hypertension   • Vitamin D deficiency   • Fatty liver   • Degenerative lumbar disc   • Chronic rhinitis   • Seasonal allergies   • Annual physical exam   • Newly diagnosed diabetes ([address])   • Hammer toes of both feet   • Inguinal lymphadenopathy   • Class 1 obesity with body mass index (BMI) of 32.0 to 32.9 in adult   • Hypercalcemia      Past Medical History:   Diagnosis Date   • Acute bronchiolitis with bronchospasm     LAST ASSESSED: 11/25/16   • COVID-19 10/14/2021   • Hypertension       Past Surgical History:   Procedure Laterality Date   • TONSILLECTOMY  2013    [facility]      Family History   Problem Relation Age of Onset   • Hypertension Mother    • Thyroid nodules Mother    • Ulcerative colitis Mother    • Diabetes unspecified Mother    • Thyroid disease unspecified Mother    • Hypertension Father    • Heart attack Paternal Grandfather 45   • Heart failure Paternal Grandfather    • Heart attack Paternal Uncle    • Coronary artery disease Paternal Uncle         CABG   • Coronary artery disease Paternal Aunt    • Heart attack Paternal Aunt    • Heart disease Maternal Grandfather         CARDIAC DISORDER   • Heart disease Paternal Grandmother         CARDIAC DISORDER   • Hypertension Paternal Grandmother    • Kidney failure Paternal Grandmother         RENAL     Social History     Tobacco Use   • Smoking status: Former     Packs/day: 0.50     Years: 17.00     Total pack years: 8.50     Types: Cigarettes     Quit date: 1/1/2020     Years since quitting: 3.6   • Smokeless tobacco: Never   • Tobacco comments:         Substance Use Topics   • Alcohol use: Yes     Comment: DRINKS APPROX 2 TIMES PER MONTH     Allergies   Allergen Reactions   • Cefaclor Hives         Current Outpatient Medications:   •  albuterol (PROVENTIL HFA,VENTOLIN HFA) 90 mcg/act inhaler, Inhale 2 puffs every 4 (four) hours as needed for wheezing, Disp: 6.7 Inhaler, Rfl: 0  •  amLODIPine (NORVASC) 5 mg tablet, TAKE 1 TABLET (5 MG TOTAL) BY MOUTH DAILY. , Disp: 90 tablet, Rfl: 2  •  Blood Glucose Monitoring Suppl (OneTouch Verio) w/Device KIT, Use 1 Device 3 (three) times a day, Disp: 1 kit, Rfl: 0  •  cholecalciferol (VITAMIN D3) 1,000 units tablet, Take 1,000 Units by mouth daily, Disp: , Rfl:   •  cyclobenzaprine (FLEXERIL) 10 mg tablet, Take 1 tablet (10 mg total) by mouth daily at bedtime, Disp: 15 tablet, Rfl: 1  •  doxycycline hyclate (VIBRAMYCIN) 100 mg capsule, Take 1 capsule (100 mg total) by mouth every 12 (twelve) hours for 10 days, Disp: 20 capsule, Rfl: 0  •  fexofenadine (ALLEGRA) 30 MG tablet, Take 30 mg by mouth daily, Disp: , Rfl:   •  fluticasone (FLONASE) 50 mcg/act nasal spray, 1 spray into each nostril daily, Disp: , Rfl:   •  glucose blood test strip, Use 1 each 3 (three) times a day Verio - Use to test 3x daily, Disp: 300 each, Rfl: 1  •  lisinopril (ZESTRIL) 10 mg tablet, Take 1 tablet (10 mg total) by mouth daily, Disp: 90 tablet, Rfl: 1  •  metFORMIN (GLUCOPHAGE-XR) 500 mg 24 hr tablet, Take 1 tablet (500 mg total) by mouth 2 (two) times a day with meals, Disp: 180 tablet, Rfl: 1  •  montelukast (SINGULAIR) 10 mg tablet, TAKE 1 TABLET BY MOUTH DAILY AT BEDTIME, Disp: 90 tablet, Rfl: 1  •  Multiple Vitamin (MULTI-DAY VITAMINS) TABS, Take by mouth, Disp: , Rfl:   •  moxifloxacin (VIGAMOX) 0.5 % ophthalmic solution, Administer 1 drop to both eyes 3 (three) times a day (Patient not taking: Reported on 8/1/2023), Disp: 3 mL, Rfl: 0    Review of Systems   Constitutional: Negative for chills and fever. HENT: Negative for ear pain and sore throat. Eyes: Negative for pain and visual disturbance. Respiratory: Negative for cough and shortness of breath. Cardiovascular: Negative for chest pain and palpitations. Gastrointestinal: Negative for abdominal pain and vomiting. Genitourinary: Negative for dysuria and hematuria. Musculoskeletal: Negative for arthralgias and back pain. Skin: Negative for color change and rash. Neurological: Negative for seizures and syncope. All other systems reviewed and are negative. Physical Exam:  Body mass index is 32.14 kg/m². /72 (BP Location: Left arm, Patient Position: Sitting, Cuff Size: Standard)   Pulse 80   Ht 6' 4" (1.93 m)   Wt 120 kg (264 lb)   BMI 32.14 kg/m²    Wt Readings from Last 3 Encounters:   08/10/23 120 kg (264 lb)   08/01/23 118 kg (260 lb)   06/20/23 107 kg (236 lb 6.4 oz)       Physical Exam  Vitals reviewed. Constitutional:       Appearance: Normal appearance. HENT:      Head: Normocephalic and atraumatic. Cardiovascular:      Rate and Rhythm: Normal rate. Heart sounds: Normal heart sounds. Pulmonary:      Effort: Pulmonary effort is normal.      Breath sounds: Normal breath sounds.    Neurological: Mental Status: He is alert and oriented to person, place, and time. Psychiatric:         Mood and Affect: Mood normal.         Behavior: Behavior normal.       Diabetic Foot Exam    Labs:   Lab Results   Component Value Date    HGBA1C 6.3 08/01/2023    HGBA1C 10.9 (H) 04/27/2023     Lab Results   Component Value Date    CREATININE 0.93 06/18/2023    CREATININE 1.10 04/27/2023    CREATININE 1.19 01/10/2020    BUN 12 06/18/2023    K 4.3 06/18/2023     (H) 06/18/2023    CO2 28 06/18/2023     eGFR   Date Value Ref Range Status   06/18/2023 101 ml/min/1.73sq m Final     Lab Results   Component Value Date    HDL 30 (L) 06/18/2023    TRIG 155 (H) 06/18/2023     Lab Results   Component Value Date     (H) 06/18/2023    AST 69 (H) 06/18/2023    ALKPHOS 68 06/18/2023     No results found for: "[id number]"  No results found for: "Andreas Camp", "TSI"      There are no Patient Instructions on file for this visit. Discussed with the patient and all questioned fully answered. He will call me if any problems arise.

## 2023-08-10 NOTE — ASSESSMENT & PLAN NOTE
Greatly improved from prior  - continue with current regimen and lifestyle modifications    Lab Results   Component Value Date    HGBA1C 6.3 08/01/2023

## 2023-08-17 ENCOUNTER — OFFICE VISIT (OUTPATIENT)
Dept: FAMILY MEDICINE CLINIC | Facility: CLINIC | Age: 43
End: 2023-08-17
Payer: COMMERCIAL

## 2023-08-17 VITALS
OXYGEN SATURATION: 98 % | HEIGHT: 76 IN | SYSTOLIC BLOOD PRESSURE: 104 MMHG | BODY MASS INDEX: 31.78 KG/M2 | HEART RATE: 72 BPM | DIASTOLIC BLOOD PRESSURE: 66 MMHG | RESPIRATION RATE: 16 BRPM | TEMPERATURE: 97.8 F | WEIGHT: 261 LBS

## 2023-08-17 DIAGNOSIS — Z00.00 ANNUAL PHYSICAL EXAM: Primary | ICD-10-CM

## 2023-08-17 DIAGNOSIS — R59.9 ENLARGED LYMPH NODE: ICD-10-CM

## 2023-08-17 PROCEDURE — 99396 PREV VISIT EST AGE 40-64: CPT | Performed by: FAMILY MEDICINE

## 2023-08-17 NOTE — PROGRESS NOTES
[address]    NAME: Moon Chris  AGE: 41 y.o. SEX: male  : 1980     DATE: 2023     Assessment and Plan:     Problem List Items Addressed This Visit        Cardiovascular and Mediastinum    Essential hypertension - Primary       Other    Annual physical exam     Annual physical exam completed at this time, please complete outstanding blood work from your endocrinologist at your convenience. Presence of enlarged left inguinal lymph node again demonstrated on physical exam, reviewed patient's ultrasound result, after discussion we will repeat this ultrasound in 6 to 12 weeks. If there is persistent finding of enlarged lymph node. If there is worsening symptoms such as enlarging lymph node, more pain, discomfort we may consider biopsy as a next step    Immunizations and preventive care screenings were discussed with patient today. Appropriate education was printed on patient's after visit summary. Discussed risks and benefits of prostate cancer screening. We discussed the controversial history of PSA screening for prostate cancer in the [facility] as well as the risk of over detection and over treatment of prostate cancer by way of PSA screening. The patient understands that PSA blood testing is an imperfect way to screen for prostate cancer and that elevated PSA levels in the blood may also be caused by infection, inflammation, prostatic trauma or manipulation, urological procedures, or by benign prostatic enlargement. The role of the digital rectal examination in prostate cancer screening was also discussed and I discussed with him that there is large interobserver variability in the findings of digital rectal examination. Counseling:  Alcohol/drug use: discussed moderation in alcohol intake, the recommendations for healthy alcohol use, and avoidance of illicit drug use.   Dental Health: discussed importance of regular tooth brushing, flossing, and dental visits. Injury prevention: discussed safety/seat belts, safety helmets, smoke detectors, carbon dioxide detectors, and smoking near bedding or upholstery. Sexual health: discussed sexually transmitted diseases, partner selection, use of condoms, avoidance of unintended pregnancy, and contraceptive alternatives. · Exercise: the importance of regular exercise/physical activity was discussed. Recommend exercise 3-5 times per week for at least 30 minutes. No follow-ups on file. Chief Complaint:     Chief Complaint   Patient presents with   • Follow-up     2 wk f/u      History of Present Illness:     Adult Annual Physical   Patient here for a comprehensive physical exam. The patient reports no problems. Nancy results of the ultrasound completed on 8/4/2023. This enlarged left inguinal lymph node measuring 1.5 cm with second cortex as well as none enlarged lymph node measuring 0.8 cm with slightly irregularly thickened cortex. Multiple similar and other lymph nodes noted. Likely reactive from a ipsilateral lower extremity infection or inflammatory process. Diet and Physical Activity  · Diet/Nutrition: diabetic diet. · Exercise: walking and half an hour a day. Depression Screening  PHQ-2/9 Depression Screening         General Health  · Sleep: sleeps well. · Hearing: normal - bilateral.  · Vision: no vision problems. · Dental: regular dental visits and brushes teeth twice daily.  Health  · Symptoms include: none     Review of Systems:     Review of Systems   Constitutional: Negative for chills and fever. HENT: Negative for congestion, rhinorrhea and sore throat. Respiratory: Negative for chest tightness and shortness of breath. Cardiovascular: Negative for chest pain. Gastrointestinal: Negative for abdominal pain, blood in stool, constipation, diarrhea, nausea and vomiting.    Genitourinary: Negative for dysuria and hematuria. Musculoskeletal: Negative for arthralgias, back pain and myalgias. Neurological: Negative for dizziness, light-headedness and headaches. Psychiatric/Behavioral: Negative for sleep disturbance.            Past Medical History:     Past Medical History:   Diagnosis Date   • Acute bronchiolitis with bronchospasm     LAST ASSESSED: 11/25/16   • COVID-19 10/14/2021   • Hypertension       Past Surgical History:     Past Surgical History:   Procedure Laterality Date   • TONSILLECTOMY  2013    [facility]      Family History:     Family History   Problem Relation Age of Onset   • Hypertension Mother    • Thyroid nodules Mother    • Ulcerative colitis Mother    • Diabetes unspecified Mother    • Thyroid disease unspecified Mother    • Hypertension Father    • Heart disease Maternal Grandfather         CARDIAC DISORDER   • Heart disease Paternal Grandmother         CARDIAC DISORDER   • Hypertension Paternal Grandmother    • Kidney failure Paternal Grandmother         RENAL   • Heart attack Paternal Grandfather 45   • Heart failure Paternal Grandfather    • Coronary artery disease Paternal Aunt    • Heart attack Paternal Aunt    • Heart attack Paternal Uncle    • Coronary artery disease Paternal Uncle         CABG      Social History:     Social History     Socioeconomic History   • Marital status: Single     Spouse name: None   • Number of children: None   • Years of education: None   • Highest education level: None   Occupational History   • Occupation:    Tobacco Use   • Smoking status: Former     Packs/day: 0.50     Years: 20.00     Total pack years: 10.00     Types: Cigarettes     Start date: 2000     Quit date: 1/1/2020     Years since quitting: 3.6   • Smokeless tobacco: Never   • Tobacco comments:         Vaping Use   • Vaping Use: Former   Substance and Sexual Activity   • Alcohol use: Yes     Comment: soc   • Drug use: No   • Sexual activity: Yes     Partners: Male     Birth control/protection: Condom Male   Other Topics Concern   • None   Social History Narrative    CAFFEINE USE: HE ADMITS TO CONSUMING CAFFEINE VIA COFFEE (1 SERVING PER DAY)    COMPLETED HIGH SCHOOL     Social Determinants of Health     Financial Resource Strain: Not on file   Food Insecurity: Not on file   Transportation Needs: Not on file   Physical Activity: Not on file   Stress: Not on file   Social Connections: Not on file   Intimate Partner Violence: Not on file   Housing Stability: Not on file      Current Medications:     Current Outpatient Medications   Medication Sig Dispense Refill   • albuterol (PROVENTIL HFA,VENTOLIN HFA) 90 mcg/act inhaler Inhale 2 puffs every 4 (four) hours as needed for wheezing 6.7 Inhaler 0   • amLODIPine (NORVASC) 5 mg tablet TAKE 1 TABLET (5 MG TOTAL) BY MOUTH DAILY.  90 tablet 2   • Blood Glucose Monitoring Suppl (OneTouch Verio) w/Device KIT Use 1 Device 3 (three) times a day 1 kit 0   • cholecalciferol (VITAMIN D3) 1,000 units tablet Take 1,000 Units by mouth daily     • cyclobenzaprine (FLEXERIL) 10 mg tablet Take 1 tablet (10 mg total) by mouth daily at bedtime 15 tablet 1   • doxycycline hyclate (VIBRAMYCIN) 100 mg capsule Take 1 capsule (100 mg total) by mouth every 12 (twelve) hours for 10 days 20 capsule 0   • fexofenadine (ALLEGRA) 30 MG tablet Take 30 mg by mouth daily     • fluticasone (FLONASE) 50 mcg/act nasal spray 1 spray into each nostril daily     • glucose blood test strip Use 1 each 3 (three) times a day Verio - Use to test 3x daily 300 each 1   • lisinopril (ZESTRIL) 10 mg tablet Take 1 tablet (10 mg total) by mouth daily 90 tablet 1   • metFORMIN (GLUCOPHAGE-XR) 500 mg 24 hr tablet Take 1 tablet (500 mg total) by mouth 2 (two) times a day with meals 180 tablet 1   • montelukast (SINGULAIR) 10 mg tablet TAKE 1 TABLET BY MOUTH DAILY AT BEDTIME 90 tablet 1   • moxifloxacin (VIGAMOX) 0.5 % ophthalmic solution Administer 1 drop to both eyes 3 (three) times a day 3 mL 0   • Multiple Vitamin (MULTI-DAY VITAMINS) TABS Take by mouth       No current facility-administered medications for this visit. Allergies: Allergies   Allergen Reactions   • Cefaclor Hives      Physical Exam:     /66 (BP Location: Right arm, Patient Position: Sitting, Cuff Size: Large)   Pulse 72   Temp 97.8 °F (36.6 °C) (Temporal)   Resp 16   Ht 6' 4" (1.93 m)   Wt 118 kg (261 lb)   SpO2 98%   BMI 31.77 kg/m²     Physical Exam  Vitals reviewed. Constitutional:       General: He is not in acute distress. Appearance: Normal appearance. He is obese. He is not ill-appearing, toxic-appearing or diaphoretic. Cardiovascular:      Rate and Rhythm: Normal rate. Pulses: Normal pulses. Heart sounds: Normal heart sounds. No murmur heard. Pulmonary:      Effort: Pulmonary effort is normal. No respiratory distress. Breath sounds: Normal breath sounds. Abdominal:      General: Abdomen is flat. Bowel sounds are normal. There is no distension. Palpations: Abdomen is soft. Musculoskeletal:         General: No swelling, tenderness, deformity or signs of injury. Skin:     General: Skin is warm and dry. Capillary Refill: Capillary refill takes less than 2 seconds. Coloration: Skin is not jaundiced. Comments: Firm, non tender nodule in the left groin, consistent with enlarged lymphnode   Neurological:      General: No focal deficit present. Mental Status: He is alert and oriented to person, place, and time.    Psychiatric:         Mood and Affect: Mood normal.          Jana Muniz MD  [id number] [facility]

## 2023-08-31 DIAGNOSIS — E11.9 DIABETES MELLITUS, NEW ONSET (HCC): ICD-10-CM

## 2023-08-31 RX ORDER — METFORMIN HYDROCHLORIDE 500 MG/1
500 TABLET, EXTENDED RELEASE ORAL 2 TIMES DAILY WITH MEALS
Qty: 180 TABLET | Refills: 1 | Status: SHIPPED | OUTPATIENT
Start: 2023-08-31

## 2023-09-01 ENCOUNTER — HOSPITAL ENCOUNTER (OUTPATIENT)
Dept: RADIOLOGY | Facility: IMAGING CENTER | Age: 43
End: 2023-09-01
Payer: COMMERCIAL

## 2023-09-01 DIAGNOSIS — R59.9 ENLARGED LYMPH NODE: ICD-10-CM

## 2023-09-01 PROCEDURE — 76705 ECHO EXAM OF ABDOMEN: CPT

## 2023-09-27 ENCOUNTER — OFFICE VISIT (OUTPATIENT)
Dept: FAMILY MEDICINE CLINIC | Facility: CLINIC | Age: 43
End: 2023-09-27
Payer: COMMERCIAL

## 2023-09-27 VITALS
HEIGHT: 76 IN | OXYGEN SATURATION: 98 % | SYSTOLIC BLOOD PRESSURE: 114 MMHG | HEART RATE: 89 BPM | DIASTOLIC BLOOD PRESSURE: 74 MMHG | WEIGHT: 269 LBS | BODY MASS INDEX: 32.76 KG/M2 | TEMPERATURE: 98 F | RESPIRATION RATE: 16 BRPM

## 2023-09-27 DIAGNOSIS — J01.90 ACUTE NON-RECURRENT SINUSITIS, UNSPECIFIED LOCATION: Primary | ICD-10-CM

## 2023-09-27 PROCEDURE — 99213 OFFICE O/P EST LOW 20 MIN: CPT | Performed by: INTERNAL MEDICINE

## 2023-09-27 RX ORDER — AZITHROMYCIN 250 MG/1
TABLET, FILM COATED ORAL
Qty: 6 TABLET | Refills: 0 | Status: SHIPPED | OUTPATIENT
Start: 2023-09-27 | End: 2023-10-02

## 2023-09-27 NOTE — ASSESSMENT & PLAN NOTE
Patient again has sinus congestion with purulent drainage and postnasal drip. Try Z-Rayshawn. He is taking OTC decongestions and antihistamines.

## 2023-09-27 NOTE — PROGRESS NOTES
Name: Trisha Neely      : 1980      MRN: [id number]  Encounter Provider: Cruz Bull MD  Encounter Date: 2023   Encounter department: [place] [address]. Acute non-recurrent sinusitis, unspecified location  Assessment & Plan:  Patient again has sinus congestion with purulent drainage and postnasal drip. Try Z-Rayshawn. He is taking OTC decongestions and antihistamines. Orders:  -     azithromycin (Zithromax) 250 mg tablet; Take 2 tablets (500 mg total) by mouth daily for 1 day, THEN 1 tablet (250 mg total) daily for 4 days. Subjective      Sinus Problem  This is a new problem. The current episode started in the past 7 days. The problem has been gradually worsening since onset. There has been no fever. The pain is mild. Associated symptoms include congestion, coughing and sinus pressure. Pertinent negatives include no chills, ear pain, shortness of breath or sore throat. Past treatments include acetaminophen and oral decongestants. The treatment provided mild relief. Review of Systems   Constitutional: Positive for fatigue. Negative for chills and fever. HENT: Positive for congestion, postnasal drip and sinus pressure. Negative for ear pain, facial swelling and sore throat. Eyes: Negative for pain and visual disturbance. Respiratory: Positive for cough. Negative for shortness of breath. Cardiovascular: Negative for chest pain and palpitations. Gastrointestinal: Negative for abdominal pain and vomiting. Genitourinary: Negative for dysuria and hematuria. Musculoskeletal: Positive for myalgias. Negative for arthralgias and back pain. Skin: Negative for color change and rash. Neurological: Negative for seizures and syncope. All other systems reviewed and are negative.       Current Outpatient Medications on File Prior to Visit   Medication Sig   • albuterol (PROVENTIL HFA,VENTOLIN HFA) 90 mcg/act inhaler Inhale 2 puffs every 4 (four) hours as needed for wheezing   • amLODIPine (NORVASC) 5 mg tablet TAKE 1 TABLET (5 MG TOTAL) BY MOUTH DAILY. • Blood Glucose Monitoring Suppl (OneTouch Verio) w/Device KIT Use 1 Device 3 (three) times a day   • cholecalciferol (VITAMIN D3) 1,000 units tablet Take 1,000 Units by mouth daily   • cyclobenzaprine (FLEXERIL) 10 mg tablet Take 1 tablet (10 mg total) by mouth daily at bedtime   • fexofenadine (ALLEGRA) 30 MG tablet Take 30 mg by mouth daily   • fluticasone (FLONASE) 50 mcg/act nasal spray 1 spray into each nostril daily   • glucose blood test strip Use 1 each 3 (three) times a day Verio - Use to test 3x daily   • lisinopril (ZESTRIL) 10 mg tablet Take 1 tablet (10 mg total) by mouth daily   • metFORMIN (GLUCOPHAGE-XR) 500 mg 24 hr tablet Take 1 tablet (500 mg total) by mouth 2 (two) times a day with meals   • montelukast (SINGULAIR) 10 mg tablet TAKE 1 TABLET BY MOUTH DAILY AT BEDTIME   • Multiple Vitamin (MULTI-DAY VITAMINS) TABS Take by mouth   • metFORMIN (GLUCOPHAGE) 500 mg tablet    • moxifloxacin (VIGAMOX) 0.5 % ophthalmic solution Administer 1 drop to both eyes 3 (three) times a day (Patient not taking: Reported on 9/27/2023)       Objective     /74 (BP Location: Left arm, Patient Position: Sitting, Cuff Size: Large)   Pulse 89   Temp 98 °F (36.7 °C) (Temporal)   Resp 16   Ht 6' 4" (1.93 m)   Wt 122 kg (269 lb)   SpO2 98%   BMI 32.74 kg/m²     Physical Exam  Constitutional:       General: He is not in acute distress. Appearance: Normal appearance. He is well-developed. He is obese. HENT:      Head: Normocephalic and atraumatic. Right Ear: Tympanic membrane and external ear normal.      Left Ear: Tympanic membrane and external ear normal.      Nose: Congestion present. Mouth/Throat:      Mouth: Mucous membranes are moist.      Pharynx: No oropharyngeal exudate. Eyes:      Extraocular Movements: Extraocular movements intact.       Pupils: Pupils are equal, round, and reactive to light. Neck:      Thyroid: No thyromegaly. Vascular: No JVD. Cardiovascular:      Rate and Rhythm: Normal rate and regular rhythm. Heart sounds: Normal heart sounds. No murmur heard. No gallop. Pulmonary:      Effort: Pulmonary effort is normal. No respiratory distress. Breath sounds: Normal breath sounds. No wheezing or rales. Abdominal:      General: Bowel sounds are normal. There is no distension. Palpations: Abdomen is soft. There is no mass. Tenderness: There is no abdominal tenderness. Musculoskeletal:         General: No tenderness. Normal range of motion. Cervical back: Normal range of motion and neck supple. No tenderness. Lymphadenopathy:      Cervical: Cervical adenopathy present. Skin:     Findings: No rash. Neurological:      Mental Status: He is alert and oriented to person, place, and time. Cranial Nerves: No cranial nerve deficit. Coordination: Coordination normal.   Psychiatric:         Behavior: Behavior normal.         Thought Content:  Thought content normal.         Judgment: Judgment normal.       Amelia Lopez MD

## 2023-11-14 ENCOUNTER — OFFICE VISIT (OUTPATIENT)
Dept: ENDOCRINOLOGY | Facility: CLINIC | Age: 43
End: 2023-11-14
Payer: COMMERCIAL

## 2023-11-14 VITALS — DIASTOLIC BLOOD PRESSURE: 78 MMHG | SYSTOLIC BLOOD PRESSURE: 128 MMHG | BODY MASS INDEX: 34.64 KG/M2 | WEIGHT: 284.6 LBS

## 2023-11-14 DIAGNOSIS — E11.69 TYPE 2 DIABETES MELLITUS WITH OTHER SPECIFIED COMPLICATION, UNSPECIFIED WHETHER LONG TERM INSULIN USE (HCC): Primary | ICD-10-CM

## 2023-11-14 DIAGNOSIS — E83.52 HYPERCALCEMIA: ICD-10-CM

## 2023-11-14 DIAGNOSIS — E11.9 DIABETES MELLITUS, NEW ONSET (HCC): ICD-10-CM

## 2023-11-14 DIAGNOSIS — I10 ESSENTIAL HYPERTENSION: ICD-10-CM

## 2023-11-14 DIAGNOSIS — E78.5 HYPERLIPIDEMIA, UNSPECIFIED HYPERLIPIDEMIA TYPE: ICD-10-CM

## 2023-11-14 LAB — SL AMB POCT HEMOGLOBIN AIC: 6.5 (ref ?–6.5)

## 2023-11-14 PROCEDURE — 99214 OFFICE O/P EST MOD 30 MIN: CPT

## 2023-11-14 PROCEDURE — 83036 HEMOGLOBIN GLYCOSYLATED A1C: CPT

## 2023-11-14 NOTE — ASSESSMENT & PLAN NOTE
Last corrected calcium from 06/2023 was 10.8. He had repeat lab work ordered with PTH which was not completed. He reports he will have this done over the weekend. Next steps will depend on results.

## 2023-11-14 NOTE — ASSESSMENT & PLAN NOTE
[id number]  HDL 30  TRIGLYCERIDES 155  CHOLESTEROL 198    LDL above goal of 70. Repeat lipid panel has been ordered. He may benefit from statin therapy if this remains elevated.

## 2023-11-14 NOTE — ASSESSMENT & PLAN NOTE
Diabetes has remained stable on current regimen.    - continue current regimen  - he may follow up with his PCP and return as needed    Lab Results   Component Value Date    HGBA1C 6.5 11/14/2023

## 2023-11-14 NOTE — PROGRESS NOTES
Established Patient Progress Note    CC: Follow up for type 2 diabetes mellitus and hypercalcemia    Impression & Plan:    Problem List Items Addressed This Visit          Endocrine    Type 2 diabetes mellitus with other specified complication ([address]) - Primary     Diabetes has remained stable on current regimen. - continue current regimen  - he may follow up with his PCP and return as needed    Lab Results   Component Value Date    HGBA1C 6.5 11/14/2023             Cardiovascular and Mediastinum    Essential hypertension     Stable  - continue current regimen            Other    Hypercalcemia     Last corrected calcium from 06/2023 was 10.8. He had repeat lab work ordered with PTH which was not completed. He reports he will have this done over the weekend. Next steps will depend on results. Relevant Orders    Creatinine, urine, 24 hour Lab Collect    Calcium, urine, 24 hour Lab Collect    Hyperlipidemia     [id number]  HDL 30  TRIGLYCERIDES 155  CHOLESTEROL 198    LDL above goal of 70. Repeat lipid panel has been ordered. He may benefit from statin therapy if this remains elevated. Other Visit Diagnoses       Diabetes mellitus, new onset ([address])        Relevant Orders    POCT hemoglobin A1c (Completed)            Orders Placed This Encounter   Procedures    Creatinine, urine, 24 hour Lab Collect     Standing Status:   Future     Standing Expiration Date:   11/14/2024    Calcium, urine, 24 hour Lab Collect     Standing Status:   Future     Standing Expiration Date:   11/14/2024    POCT hemoglobin A1c       History of Present Illness:   Nel Camacho is a 41 y.o. male with a history of type 2 diabetes, hypertension, fatty liver, obesity, hypercalcemia. Complications:  none. Last A1C was 6.5. Home glucose monitoring: home glucose meter     He has no complaints today. He feels well.      Hypoglycemia: no  Recent hospitalizations or illnesses: no   Problems with current regimen: no     Note: history of groin infection previously treated with antibiotics. Avoid SGLT-2 inhibitors     Hypercalcemia: This was elevated on lab work on 2 occasions. Most recent corrected calcium from 06/2023 was 10.8. He does not take calcium supplements. Denies any history of fractures or kidney stones.      Current regimen:   Metformin 500 mg BID     Last Eye Exam: UTD, 6/3/23  Last Foot Exam: UTD, 5/25/23     ACE/ARB: lisinopril       Patient Active Problem List   Diagnosis    Abnormal LFTs    Essential hypertension    Vitamin D deficiency    Fatty liver    Degenerative lumbar disc    Chronic rhinitis    Acute non-recurrent sinusitis    Seasonal allergies    Annual physical exam    Newly diagnosed diabetes ([address])    Hammer toes of both feet    Inguinal lymphadenopathy    Class 1 obesity with body mass index (BMI) of 32.0 to 32.9 in adult    Hypercalcemia    Type 2 diabetes mellitus with other specified complication ([address])    Hyperlipidemia      Past Medical History:   Diagnosis Date    Acute bronchiolitis with bronchospasm     LAST ASSESSED: 11/25/16    COVID-19 10/14/2021    Hypertension       Past Surgical History:   Procedure Laterality Date    TONSILLECTOMY  2013    [facility]      Family History   Problem Relation Age of Onset    Hypertension Mother     Thyroid nodules Mother     Ulcerative colitis Mother     Diabetes unspecified Mother     Thyroid disease unspecified Mother     Hypertension Father     Heart disease Maternal Grandfather         CARDIAC DISORDER    Heart disease Paternal Grandmother         CARDIAC DISORDER    Hypertension Paternal Grandmother     Kidney failure Paternal Grandmother         RENAL    Heart attack Paternal Grandfather 45    Heart failure Paternal Grandfather     Coronary artery disease Paternal Aunt     Heart attack Paternal Aunt     Heart attack Paternal Uncle     Coronary artery disease Paternal Uncle         CABG     Social History     Tobacco Use    Smoking status: Former     Packs/day: 0.50     Years: 20.00     Total pack years: 10.00     Types: Cigarettes     Start date: 2000     Quit date: 1/1/2020     Years since quitting: 3.8    Smokeless tobacco: Never    Tobacco comments:         Substance Use Topics    Alcohol use: Yes     Comment: soc     Allergies   Allergen Reactions    Cefaclor Hives         Current Outpatient Medications:     albuterol (PROVENTIL HFA,VENTOLIN HFA) 90 mcg/act inhaler, Inhale 2 puffs every 4 (four) hours as needed for wheezing, Disp: 6.7 Inhaler, Rfl: 0    amLODIPine (NORVASC) 5 mg tablet, TAKE 1 TABLET (5 MG TOTAL) BY MOUTH DAILY. , Disp: 90 tablet, Rfl: 2    Blood Glucose Monitoring Suppl (OneTouch Verio) w/Device KIT, Use 1 Device 3 (three) times a day, Disp: 1 kit, Rfl: 0    cholecalciferol (VITAMIN D3) 1,000 units tablet, Take 1,000 Units by mouth daily, Disp: , Rfl:     cyclobenzaprine (FLEXERIL) 10 mg tablet, Take 1 tablet (10 mg total) by mouth daily at bedtime, Disp: 15 tablet, Rfl: 1    fexofenadine (ALLEGRA) 30 MG tablet, Take 30 mg by mouth daily, Disp: , Rfl:     fluticasone (FLONASE) 50 mcg/act nasal spray, 1 spray into each nostril daily, Disp: , Rfl:     glucose blood test strip, Use 1 each 3 (three) times a day Verio - Use to test 3x daily, Disp: 300 each, Rfl: 1    lisinopril (ZESTRIL) 10 mg tablet, Take 1 tablet (10 mg total) by mouth daily, Disp: 90 tablet, Rfl: 1    metFORMIN (GLUCOPHAGE-XR) 500 mg 24 hr tablet, Take 1 tablet (500 mg total) by mouth 2 (two) times a day with meals, Disp: 180 tablet, Rfl: 1    montelukast (SINGULAIR) 10 mg tablet, TAKE 1 TABLET BY MOUTH DAILY AT BEDTIME, Disp: 90 tablet, Rfl: 1    Multiple Vitamin (MULTI-DAY VITAMINS) TABS, Take by mouth, Disp: , Rfl:     metFORMIN (GLUCOPHAGE) 500 mg tablet, , Disp: , Rfl:     moxifloxacin (VIGAMOX) 0.5 % ophthalmic solution, Administer 1 drop to both eyes 3 (three) times a day (Patient not taking: Reported on 9/27/2023), Disp: 3 mL, Rfl: 0    Review of Systems   Constitutional: Negative for chills and fever. HENT:  Negative for ear pain and sore throat. Eyes:  Negative for pain and visual disturbance. Respiratory:  Negative for cough and shortness of breath. Cardiovascular:  Negative for chest pain and palpitations. Gastrointestinal:  Negative for abdominal pain and vomiting. Genitourinary:  Negative for dysuria and hematuria. Musculoskeletal:  Negative for arthralgias and back pain. Skin:  Negative for color change and rash. Neurological:  Negative for seizures and syncope. All other systems reviewed and are negative. Physical Exam:  Body mass index is 34.64 kg/m². /78 (BP Location: Right arm, Patient Position: Sitting, Cuff Size: Adult)   Wt 129 kg (284 lb 9.6 oz)   BMI 34.64 kg/m²    Wt Readings from Last 3 Encounters:   11/14/23 129 kg (284 lb 9.6 oz)   09/27/23 122 kg (269 lb)   08/17/23 118 kg (261 lb)       Physical Exam  Vitals reviewed. Constitutional:       Appearance: Normal appearance. HENT:      Head: Normocephalic and atraumatic. Cardiovascular:      Rate and Rhythm: Normal rate. Heart sounds: Normal heart sounds. Pulmonary:      Effort: Pulmonary effort is normal.      Breath sounds: Normal breath sounds. Neurological:      Mental Status: He is alert and oriented to person, place, and time.    Psychiatric:         Mood and Affect: Mood normal.         Behavior: Behavior normal.       Diabetic Foot Exam    Labs:   Lab Results   Component Value Date    HGBA1C 6.5 11/14/2023    HGBA1C 6.3 08/01/2023    HGBA1C 10.9 (H) 04/27/2023     Lab Results   Component Value Date    CREATININE 0.93 06/18/2023    CREATININE 1.10 04/27/2023    CREATININE 1.19 01/10/2020    BUN 12 06/18/2023    K 4.3 06/18/2023     (H) 06/18/2023    CO2 28 06/18/2023     eGFR   Date Value Ref Range Status   06/18/2023 101 ml/min/1.73sq m Final     Lab Results   Component Value Date    HDL 30 (L) 06/18/2023    TRIG 155 (H) 06/18/2023     Lab Results Component Value Date     (H) 06/18/2023    AST 69 (H) 06/18/2023    ALKPHOS 68 06/18/2023     No results found for: "[id number]"  No results found for: "Moi Adkins", "TSI"      There are no Patient Instructions on file for this visit. Discussed with the patient and all questioned fully answered. He will call me if any problems arise.

## 2023-11-18 ENCOUNTER — APPOINTMENT (OUTPATIENT)
Dept: LAB | Age: 43
End: 2023-11-18
Payer: COMMERCIAL

## 2023-11-18 DIAGNOSIS — E66.9 CLASS 1 OBESITY WITH BODY MASS INDEX (BMI) OF 32.0 TO 32.9 IN ADULT, UNSPECIFIED OBESITY TYPE, UNSPECIFIED WHETHER SERIOUS COMORBIDITY PRESENT: ICD-10-CM

## 2023-11-18 DIAGNOSIS — E11.9 NEWLY DIAGNOSED DIABETES (HCC): ICD-10-CM

## 2023-11-18 DIAGNOSIS — I10 ESSENTIAL HYPERTENSION: ICD-10-CM

## 2023-11-18 DIAGNOSIS — K76.0 FATTY LIVER: ICD-10-CM

## 2023-11-18 DIAGNOSIS — E83.52 HYPERCALCEMIA: ICD-10-CM

## 2023-11-18 DIAGNOSIS — E55.9 VITAMIN D DEFICIENCY: ICD-10-CM

## 2023-11-18 LAB
25(OH)D3 SERPL-MCNC: 15.4 NG/ML (ref 30–100)
ALBUMIN SERPL BCP-MCNC: 4.2 G/DL (ref 3.5–5)
CALCIUM SERPL-MCNC: 11.1 MG/DL (ref 8.4–10.2)
CHOLEST SERPL-MCNC: 218 MG/DL
EST. AVERAGE GLUCOSE BLD GHB EST-MCNC: 143 MG/DL
HBA1C MFR BLD: 6.6 %
HDLC SERPL-MCNC: 27 MG/DL
NONHDLC SERPL-MCNC: 191 MG/DL
PTH-INTACT SERPL-MCNC: 150.4 PG/ML (ref 12–88)
TRIGL SERPL-MCNC: 422 MG/DL

## 2023-11-18 PROCEDURE — 83036 HEMOGLOBIN GLYCOSYLATED A1C: CPT

## 2023-11-18 PROCEDURE — 83970 ASSAY OF PARATHORMONE: CPT

## 2023-11-18 PROCEDURE — 82310 ASSAY OF CALCIUM: CPT

## 2023-11-18 PROCEDURE — 36415 COLL VENOUS BLD VENIPUNCTURE: CPT

## 2023-11-18 PROCEDURE — 80061 LIPID PANEL: CPT

## 2023-11-18 PROCEDURE — 82040 ASSAY OF SERUM ALBUMIN: CPT

## 2023-11-18 PROCEDURE — 82306 VITAMIN D 25 HYDROXY: CPT

## 2023-11-19 ENCOUNTER — APPOINTMENT (OUTPATIENT)
Dept: LAB | Age: 43
End: 2023-11-19
Payer: COMMERCIAL

## 2023-11-19 DIAGNOSIS — E83.52 HYPERCALCEMIA: ICD-10-CM

## 2023-11-19 LAB
CALCIUM 24H UR-MCNC: 300 MG/24 HRS (ref 100–300)
CREAT 24H UR-MRATE: 1.5 G/24HR (ref 0.8–1.8)
SPECIMEN VOL UR: 2500 ML
SPECIMEN VOL UR: 2500 ML

## 2023-11-19 PROCEDURE — 82340 ASSAY OF CALCIUM IN URINE: CPT

## 2023-11-19 PROCEDURE — 82570 ASSAY OF URINE CREATININE: CPT

## 2023-11-24 ENCOUNTER — TELEPHONE (OUTPATIENT)
Dept: ENDOCRINOLOGY | Facility: CLINIC | Age: 43
End: 2023-11-24

## 2023-11-24 DIAGNOSIS — E78.5 HYPERLIPIDEMIA, UNSPECIFIED HYPERLIPIDEMIA TYPE: ICD-10-CM

## 2023-11-24 DIAGNOSIS — E21.3 HYPERPARATHYROIDISM (HCC): Primary | ICD-10-CM

## 2023-11-24 RX ORDER — ATORVASTATIN CALCIUM 20 MG/1
20 TABLET, FILM COATED ORAL DAILY
Qty: 30 TABLET | Refills: 2 | Status: SHIPPED | OUTPATIENT
Start: 2023-11-24

## 2023-11-24 NOTE — TELEPHONE ENCOUNTER
Spoke with patient. He is amenable to plan for having parathyroid uptake scan done to further assess for primary hyperparathyroidism. He will also start atorvastatin 20 mg daily and repeat lipid panel in 3 months.

## 2023-11-25 DIAGNOSIS — I10 ESSENTIAL HYPERTENSION: ICD-10-CM

## 2023-11-26 PROBLEM — J01.90 ACUTE NON-RECURRENT SINUSITIS: Status: RESOLVED | Noted: 2022-04-26 | Resolved: 2023-11-26

## 2023-11-27 RX ORDER — AMLODIPINE BESYLATE 5 MG/1
5 TABLET ORAL DAILY
Qty: 90 TABLET | Refills: 2 | Status: SHIPPED | OUTPATIENT
Start: 2023-11-27

## 2023-12-18 ENCOUNTER — HOSPITAL ENCOUNTER (OUTPATIENT)
Dept: RADIOLOGY | Facility: HOSPITAL | Age: 43
Discharge: HOME | End: 2023-12-18
Payer: COMMERCIAL

## 2023-12-18 DIAGNOSIS — E78.5 HYPERLIPIDEMIA, UNSPECIFIED HYPERLIPIDEMIA TYPE: ICD-10-CM

## 2023-12-18 DIAGNOSIS — E21.3 HYPERPARATHYROIDISM (HCC): ICD-10-CM

## 2023-12-18 PROCEDURE — 78072 PARATHYRD PLANAR W/SPECT&CT: CPT

## 2023-12-18 PROCEDURE — G1004 CDSM NDSC: HCPCS

## 2023-12-18 PROCEDURE — A9500 TC99M SESTAMIBI: HCPCS

## 2023-12-18 RX ORDER — ATORVASTATIN CALCIUM 20 MG/1
20 TABLET, FILM COATED ORAL DAILY
Qty: 90 TABLET | Refills: 1 | Status: SHIPPED | OUTPATIENT
Start: 2023-12-18

## 2023-12-20 DIAGNOSIS — E83.52 HYPERCALCEMIA: Primary | ICD-10-CM

## 2024-01-04 ENCOUNTER — TELEMEDICINE (OUTPATIENT)
Dept: FAMILY MEDICINE CLINIC | Facility: CLINIC | Age: 44
End: 2024-01-04
Payer: COMMERCIAL

## 2024-01-04 DIAGNOSIS — U07.1 COVID-19: Primary | ICD-10-CM

## 2024-01-04 PROCEDURE — 99213 OFFICE O/P EST LOW 20 MIN: CPT | Performed by: INTERNAL MEDICINE

## 2024-01-04 RX ORDER — NIRMATRELVIR AND RITONAVIR 300-100 MG
3 KIT ORAL 2 TIMES DAILY
Qty: 30 TABLET | Refills: 0 | Status: SHIPPED | OUTPATIENT
Start: 2024-01-04 | End: 2024-01-09

## 2024-01-04 NOTE — PROGRESS NOTES
Virtual Regular Visit    Verification of patient location:    Patient is located at Home in the following state in which I hold an active license [place]      Assessment/Plan:    Problem List Items Addressed This Visit       COVID-19 - Primary    Relevant Medications    nirmatrelvir & ritonavir (Paxlovid, 300/100,) tablet therapy pack            Reason for visit is No chief complaint on file.       Encounter provider Brooke Kwon MD    Provider located at [facility]  [address]  [address]  [place] [address]  [phone]      Recent Visits  No visits were found meeting these conditions.  Showing recent visits within past 7 days and meeting all other requirements  Future Appointments  No visits were found meeting these conditions.  Showing future appointments within next 150 days and meeting all other requirements       The patient was identified by name and date of birth. Sander Branch was informed that this is a telemedicine visit and that the visit is being conducted through the Epic Embedded platform. He agrees to proceed..  My office door was closed. No one else was in the room.  He acknowledged consent and understanding of privacy and security of the video platform. The patient has agreed to participate and understands they can discontinue the visit at any time.    Patient is aware this is a billable service.     Subjective  Sander Branch is a 43 y.o. male diabetis .      Complains of fever chills cough shortness of breath and URI symptoms         Past Medical History:   Diagnosis Date    Acute bronchiolitis with bronchospasm     LAST ASSESSED: 11/25/16    COVID-19 10/14/2021    Hypertension        Past Surgical History:   Procedure Laterality Date    TONSILLECTOMY  2013    [facility]       Current Outpatient Medications   Medication Sig Dispense Refill    nirmatrelvir & ritonavir (Paxlovid, 300/100,) tablet therapy pack Take 3 tablets by mouth 2 (two)  times a day for 5 days Take 2 nirmatrelvir tablets + 1 ritonavir tablet together per dose 30 tablet 0    albuterol (PROVENTIL HFA,VENTOLIN HFA) 90 mcg/act inhaler Inhale 2 puffs every 4 (four) hours as needed for wheezing 6.7 Inhaler 0    amLODIPine (NORVASC) 5 mg tablet TAKE 1 TABLET (5 MG TOTAL) BY MOUTH DAILY. 90 tablet 2    atorvastatin (LIPITOR) 20 mg tablet TAKE 1 TABLET BY MOUTH EVERY DAY 90 tablet 1    Blood Glucose Monitoring Suppl (OneTouch Verio) w/Device KIT Use 1 Device 3 (three) times a day 1 kit 0    cholecalciferol (VITAMIN D3) 1,000 units tablet Take 1,000 Units by mouth daily      cyclobenzaprine (FLEXERIL) 10 mg tablet Take 1 tablet (10 mg total) by mouth daily at bedtime 15 tablet 1    fexofenadine (ALLEGRA) 30 MG tablet Take 30 mg by mouth daily      fluticasone (FLONASE) 50 mcg/act nasal spray 1 spray into each nostril daily      glucose blood test strip Use 1 each 3 (three) times a day Verio - Use to test 3x daily 300 each 1    lisinopril (ZESTRIL) 10 mg tablet Take 1 tablet (10 mg total) by mouth daily 90 tablet 1    metFORMIN (GLUCOPHAGE) 500 mg tablet  (Patient not taking: Reported on 11/14/2023)      metFORMIN (GLUCOPHAGE-XR) 500 mg 24 hr tablet Take 1 tablet (500 mg total) by mouth 2 (two) times a day with meals 180 tablet 1    montelukast (SINGULAIR) 10 mg tablet TAKE 1 TABLET BY MOUTH DAILY AT BEDTIME 90 tablet 1    moxifloxacin (VIGAMOX) 0.5 % ophthalmic solution Administer 1 drop to both eyes 3 (three) times a day (Patient not taking: Reported on 9/27/2023) 3 mL 0    Multiple Vitamin (MULTI-DAY VITAMINS) TABS Take by mouth       No current facility-administered medications for this visit.        Allergies   Allergen Reactions    Cefaclor Hives       Review of Systems   Constitutional:  Positive for chills, fatigue and fever.   HENT:  Positive for congestion, postnasal drip and sore throat. Negative for ear pain.    Eyes:  Negative for pain and visual disturbance.   Respiratory:   Positive for cough, chest tightness and shortness of breath.    Cardiovascular:  Negative for chest pain and palpitations.   Gastrointestinal:  Negative for abdominal pain and vomiting.   Genitourinary:  Negative for dysuria and hematuria.   Musculoskeletal:  Negative for arthralgias and back pain.   Skin:  Negative for color change and rash.   Neurological:  Negative for seizures and syncope.   All other systems reviewed and are negative.      Video Exam    There were no vitals filed for this visit.    Physical Exam  Constitutional:       Appearance: Normal appearance. He is obese.   HENT:      Right Ear: Tympanic membrane normal.      Left Ear: Tympanic membrane normal.      Nose: Congestion present.      Mouth/Throat:      Pharynx: No oropharyngeal exudate.   Eyes:      Extraocular Movements: Extraocular movements intact.      Conjunctiva/sclera: Conjunctivae normal.      Pupils: Pupils are equal, round, and reactive to light.   Cardiovascular:      Rate and Rhythm: Normal rate.   Pulmonary:      Effort: No respiratory distress.   Skin:     General: Skin is warm.      Findings: No rash.   Neurological:      General: No focal deficit present.   Psychiatric:         Mood and Affect: Mood normal.          Visit Time  Total Visit Duration: 15 min

## 2024-01-04 NOTE — ASSESSMENT & PLAN NOTE
Patient started with nasal congestion and followed by on nonproductive cough and some mild shortness of breath yesterday. He tested himself toward evening and was COBIT positive. He is vaccinated for same.

## 2024-03-25 DIAGNOSIS — I10 ESSENTIAL HYPERTENSION: ICD-10-CM

## 2024-03-25 RX ORDER — LISINOPRIL 10 MG/1
10 TABLET ORAL DAILY
Qty: 90 TABLET | Refills: 1 | Status: SHIPPED | OUTPATIENT
Start: 2024-03-25

## 2024-06-22 DIAGNOSIS — E78.5 HYPERLIPIDEMIA, UNSPECIFIED HYPERLIPIDEMIA TYPE: ICD-10-CM

## 2024-06-22 RX ORDER — ATORVASTATIN CALCIUM 20 MG/1
20 TABLET, FILM COATED ORAL DAILY
Qty: 90 TABLET | Refills: 1 | Status: SHIPPED | OUTPATIENT
Start: 2024-06-22

## 2024-10-09 ENCOUNTER — TELEPHONE (OUTPATIENT)
Dept: FAMILY MEDICINE CLINIC | Facility: CLINIC | Age: 44
End: 2024-10-09

## 2024-10-16 ENCOUNTER — OFFICE VISIT (OUTPATIENT)
Dept: GASTROENTEROLOGY | Facility: CLINIC | Age: 44
End: 2024-10-16
Payer: COMMERCIAL

## 2024-10-16 VITALS
WEIGHT: 241.6 LBS | DIASTOLIC BLOOD PRESSURE: 80 MMHG | TEMPERATURE: 98.2 F | SYSTOLIC BLOOD PRESSURE: 128 MMHG | BODY MASS INDEX: 29.42 KG/M2 | HEIGHT: 76 IN

## 2024-10-16 DIAGNOSIS — R13.10 DYSPHAGIA, UNSPECIFIED TYPE: ICD-10-CM

## 2024-10-16 DIAGNOSIS — R19.4 CHANGE IN BOWEL HABITS: Primary | ICD-10-CM

## 2024-10-16 DIAGNOSIS — K21.9 GASTROESOPHAGEAL REFLUX DISEASE WITHOUT ESOPHAGITIS: ICD-10-CM

## 2024-10-16 DIAGNOSIS — R10.84 GENERALIZED ABDOMINAL PAIN: ICD-10-CM

## 2024-10-16 PROCEDURE — 99244 OFF/OP CNSLTJ NEW/EST MOD 40: CPT | Performed by: PHYSICIAN ASSISTANT

## 2024-10-16 RX ORDER — OMEPRAZOLE 40 MG/1
40 CAPSULE, DELAYED RELEASE ORAL DAILY
Qty: 90 CAPSULE | Refills: 2 | Status: SHIPPED | OUTPATIENT
Start: 2024-10-16

## 2024-10-16 NOTE — PROGRESS NOTES
"[facility] - Outpatient Consultation  Sander MCKOY Redline 44 y.o. male MRN: [id number]  Encounter: [id number]          ASSESSMENT AND PLAN:       Syd is a 45 y/o male with HTN, DM2, HLD who presents for consultation of hiatal hernia.     GERD  Dysphagia   Changes in bowel habits  Abdominal pain  Patient says that he has had issues with heartburn for his \"whole life.\"  He says he also has noticed that from time to time he will have trouble swallowing some solid foods and it is not always related to his heartburn however this is not very frequent.  He says the reason why he made this appointment is because about 1 to 2 months ago, he started to notice changes in his bowel habits.  He says he used to move his bowels several times a day without any issues however he has recently been noticing that either he will skip a day entirely or he will only evacuate small amounts of stool.  He says that he usually skips a day without going about once or twice a week.  He denies diarrhea but says that there are times when he only evacuates \"mush.\"  He says that since then, he has been having abdominal pain throughout his abdomen a few times a week.  He says that he usually finds relief after he moves his bowels.  Pt was seen to have potential hiatal hernia on CT in 2020 but I do not see any barium swallow or EGD done.  Patient says he has been on over-the-counter omeprazole for \"a long time\" without relief.  -TSH, CMP, CBC ordered  -EGD and colonoscopy ordered to rule out H.pylori, PUD, hiatal hernia, celiac disease, erosions, obstructive etiology, tortuous colon  -Stop over-the-counter omeprazole  -Start omeprazole 40 mg daily  -Please ensure you are drinking at least 64 ounces of water a day  -Please start over-the-counter MiraLAX daily as needed constipation or straining      ______________________________________________________________________    HPI:  Syd is a 45 y/o male with HTN, DM2, HLD who " "presents for consultation of hiatal hernia. Patient says that he has had issues with heartburn for his \"whole life.\"  He says he also has noticed that from time to time he will have trouble swallowing some solid foods and it is not always related to his heartburn however this is not very frequent.  He says the reason why he made this appointment is because about 1 to 2 months ago, he started to notice changes in his bowel habits.  He says he used to move his bowels several times a day without any issues however he has recently been noticing that either he will skip a day entirely or he will only evacuate small amounts of stool.  He says that he usually skips a day without going about once or twice a week.  He denies diarrhea but says that there are times when he only evacuates \"mush.\"  He says that since then, he has been having abdominal pain throughout his abdomen a few times a week.  He says that he usually finds relief after he moves his bowels. Patient says he has been on over-the-counter omeprazole for \"a long time\" without relief.  Patient denies any trouble swallowing pills or liquids, nausea vomiting, family history of colon cancer, unintentional weight loss, fevers, chills, night sweats, bloody or black stools.  He says he has lost about 60 pounds since last year however he has been eating less and he feels this initially started due to his new diagnosis of diabetes and being put on those medications.       REVIEW OF SYSTEMS:    CONSTITUTIONAL: Denies any fever, chills, rigors, and weight loss.  HEENT: No earache or tinnitus. Denies hearing loss or visual disturbances.  CARDIOVASCULAR: No chest pain or palpitations.   RESPIRATORY: Denies any cough, hemoptysis, shortness of breath or dyspnea on exertion.  GASTROINTESTINAL: As noted in the History of Present Illness.   GENITOURINARY: No problems with urination. Denies any hematuria or dysuria.  NEUROLOGIC: No dizziness or vertigo, denies headaches. "   MUSCULOSKELETAL: Denies any muscle or joint pain.   SKIN: Denies skin rashes or itching.   ENDOCRINE: Denies excessive thirst. Denies intolerance to heat or cold.  PSYCHOSOCIAL: Denies depression or anxiety. Denies any recent memory loss.       Historical Information   Past Medical History:   Diagnosis Date    Acute bronchiolitis with bronchospasm     LAST ASSESSED: 18    COVID-19 10/14/2021    Hypertension      Past Surgical History:   Procedure Laterality Date    TONSILLECTOMY  2013    [facility]     Social History   Social History     Substance and Sexual Activity   Alcohol Use Yes    Comment: soc     Social History     Substance and Sexual Activity   Drug Use No     Social History     Tobacco Use   Smoking Status Former    Current packs/day: 0.00    Average packs/day: 0.5 packs/day for 20.0 years (10.0 ttl pk-yrs)    Types: Cigarettes    Start date:     Quit date: 2020    Years since quittin.7   Smokeless Tobacco Never   Tobacco Comments          Family History   Problem Relation Age of Onset    Hypertension Mother     Thyroid nodules Mother     Ulcerative colitis Mother     Diabetes unspecified Mother     Thyroid disease unspecified Mother     Hypertension Father     Heart disease Maternal Grandfather         CARDIAC DISORDER    Heart disease Paternal Grandmother         CARDIAC DISORDER    Hypertension Paternal Grandmother     Kidney failure Paternal Grandmother         RENAL    Heart attack Paternal Grandfather 38    Heart failure Paternal Grandfather     Coronary artery disease Paternal Aunt     Heart attack Paternal Aunt     Heart attack Paternal Uncle     Coronary artery disease Paternal Uncle         CABG       Meds/Allergies       Current Outpatient Medications:     albuterol (PROVENTIL HFA,VENTOLIN HFA) 90 mcg/act inhaler    amLODIPine (NORVASC) 5 mg tablet    atorvastatin (LIPITOR) 20 mg tablet    Blood Glucose Monitoring Suppl (OneTouch Verio) w/Device KIT     cholecalciferol (VITAMIN D3) 1,000 units tablet    cyclobenzaprine (FLEXERIL) 10 mg tablet    fexofenadine (ALLEGRA) 30 MG tablet    fluticasone (FLONASE) 50 mcg/act nasal spray    glucose blood test strip    lisinopril (ZESTRIL) 10 mg tablet    metFORMIN (GLUCOPHAGE) 500 mg tablet    metFORMIN (GLUCOPHAGE-XR) 500 mg 24 hr tablet    montelukast (SINGULAIR) 10 mg tablet    moxifloxacin (VIGAMOX) 0.5 % ophthalmic solution    Multiple Vitamin (MULTI-DAY VITAMINS) TABS    Allergies   Allergen Reactions    Cefaclor Hives           Objective     There were no vitals taken for this visit. There is no height or weight on file to calculate BMI.        PHYSICAL EXAM:      General Appearance:   Alert, cooperative, no distress   HEENT:   Normocephalic, atraumatic, anicteric.     Neck:  Supple, symmetrical, trachea midline   Lungs:   Clear to auscultation bilaterally; no rales, rhonchi or wheezing; respirations unlabored    Heart::   Regular rate and rhythm; no murmur, rub, or gallop.   Abdomen:   Soft, non-tender, non-distended; normal bowel sounds; no masses, no organomegaly    Genitalia:   Deferred    Rectal:   Deferred    Extremities:  No cyanosis, clubbing or edema    Pulses:  2+ and symmetric    Skin:  No jaundice, rashes, or lesions    Lymph nodes:  No palpable cervical lymphadenopathy        Lab Results:   No visits with results within 1 Day(s) from this visit.   Latest known visit with results is:   Appointment on 11/18/2023   Component Date Value    Hemoglobin A1C 11/18/2023 6.6 (H)     EAG 11/18/2023 143     Cholesterol 11/18/2023 218 (H)     Triglycerides 11/18/2023 422 (H)     HDL, Direct 11/18/2023 27 (L)     LDL Calculated 11/18/2023      Non-HDL-Chol (CHOL-HDL) 11/18/2023 191     PTH 11/18/2023 150.4 (H)     Calcium 11/18/2023 11.1 (H)     Albumin 11/18/2023 4.2     Vit D, 25-Hydroxy 11/18/2023 15.4 (L)     Creatinine, 24H Ur 11/19/2023 1.5     TOTAL URINE VOLUME 11/19/2023 2,500     24H Urine Volume 11/19/2023  2,500     Calcium, 24H Urine 11/19/2023 300          Radiology Results:   No results found.

## 2024-10-16 NOTE — PATIENT INSTRUCTIONS
Make sure you stay hydrated (64 ounces of water/day)  Over-the-counter miralax daily as needed for constipation or straining. This can be taken twice/day if needed. If you are having a good day where you feel you are moving your bowels well, do not.   Blood work  Egd and colonoscopy     Scheduled date of EGD/colonoscopy (as of today): 11/04/24  Physician performing EGD/colonoscopy: Dr. Karen Falcon  Location of EGD/colonoscopy: [place]  Desired bowel prep reviewed with patient: Golytely  Instructions reviewed with patient by: Christiana  Clearances:  none

## 2024-10-16 NOTE — H&P (VIEW-ONLY)
"[facility] - Outpatient Consultation  Sander MCKOY Redline 44 y.o. male MRN: [id number]  Encounter: [id number]          ASSESSMENT AND PLAN:       Syd is a 45 y/o male with HTN, DM2, HLD who presents for consultation of hiatal hernia.     GERD  Dysphagia   Changes in bowel habits  Abdominal pain  Patient says that he has had issues with heartburn for his \"whole life.\"  He says he also has noticed that from time to time he will have trouble swallowing some solid foods and it is not always related to his heartburn however this is not very frequent.  He says the reason why he made this appointment is because about 1 to 2 months ago, he started to notice changes in his bowel habits.  He says he used to move his bowels several times a day without any issues however he has recently been noticing that either he will skip a day entirely or he will only evacuate small amounts of stool.  He says that he usually skips a day without going about once or twice a week.  He denies diarrhea but says that there are times when he only evacuates \"mush.\"  He says that since then, he has been having abdominal pain throughout his abdomen a few times a week.  He says that he usually finds relief after he moves his bowels.  Pt was seen to have potential hiatal hernia on CT in 2020 but I do not see any barium swallow or EGD done.  Patient says he has been on over-the-counter omeprazole for \"a long time\" without relief.  -TSH, CMP, CBC ordered  -EGD and colonoscopy ordered to rule out H.pylori, PUD, hiatal hernia, celiac disease, erosions, obstructive etiology, tortuous colon  -Stop over-the-counter omeprazole  -Start omeprazole 40 mg daily  -Please ensure you are drinking at least 64 ounces of water a day  -Please start over-the-counter MiraLAX daily as needed constipation or straining      ______________________________________________________________________    HPI:  Syd is a 45 y/o male with HTN, DM2, HLD who " "presents for consultation of hiatal hernia. Patient says that he has had issues with heartburn for his \"whole life.\"  He says he also has noticed that from time to time he will have trouble swallowing some solid foods and it is not always related to his heartburn however this is not very frequent.  He says the reason why he made this appointment is because about 1 to 2 months ago, he started to notice changes in his bowel habits.  He says he used to move his bowels several times a day without any issues however he has recently been noticing that either he will skip a day entirely or he will only evacuate small amounts of stool.  He says that he usually skips a day without going about once or twice a week.  He denies diarrhea but says that there are times when he only evacuates \"mush.\"  He says that since then, he has been having abdominal pain throughout his abdomen a few times a week.  He says that he usually finds relief after he moves his bowels. Patient says he has been on over-the-counter omeprazole for \"a long time\" without relief.  Patient denies any trouble swallowing pills or liquids, nausea vomiting, family history of colon cancer, unintentional weight loss, fevers, chills, night sweats, bloody or black stools.  He says he has lost about 60 pounds since last year however he has been eating less and he feels this initially started due to his new diagnosis of diabetes and being put on those medications.       REVIEW OF SYSTEMS:    CONSTITUTIONAL: Denies any fever, chills, rigors, and weight loss.  HEENT: No earache or tinnitus. Denies hearing loss or visual disturbances.  CARDIOVASCULAR: No chest pain or palpitations.   RESPIRATORY: Denies any cough, hemoptysis, shortness of breath or dyspnea on exertion.  GASTROINTESTINAL: As noted in the History of Present Illness.   GENITOURINARY: No problems with urination. Denies any hematuria or dysuria.  NEUROLOGIC: No dizziness or vertigo, denies headaches. "   MUSCULOSKELETAL: Denies any muscle or joint pain.   SKIN: Denies skin rashes or itching.   ENDOCRINE: Denies excessive thirst. Denies intolerance to heat or cold.  PSYCHOSOCIAL: Denies depression or anxiety. Denies any recent memory loss.       Historical Information   Past Medical History:   Diagnosis Date    Acute bronchiolitis with bronchospasm     LAST ASSESSED: 16    COVID-19 10/14/2021    Hypertension      Past Surgical History:   Procedure Laterality Date    TONSILLECTOMY  2013    [facility]     Social History   Social History     Substance and Sexual Activity   Alcohol Use Yes    Comment: soc     Social History     Substance and Sexual Activity   Drug Use No     Social History     Tobacco Use   Smoking Status Former    Current packs/day: 0.00    Average packs/day: 0.5 packs/day for 20.0 years (10.0 ttl pk-yrs)    Types: Cigarettes    Start date:     Quit date: 2020    Years since quittin.7   Smokeless Tobacco Never   Tobacco Comments          Family History   Problem Relation Age of Onset    Hypertension Mother     Thyroid nodules Mother     Ulcerative colitis Mother     Diabetes unspecified Mother     Thyroid disease unspecified Mother     Hypertension Father     Heart disease Maternal Grandfather         CARDIAC DISORDER    Heart disease Paternal Grandmother         CARDIAC DISORDER    Hypertension Paternal Grandmother     Kidney failure Paternal Grandmother         RENAL    Heart attack Paternal Grandfather 38    Heart failure Paternal Grandfather     Coronary artery disease Paternal Aunt     Heart attack Paternal Aunt     Heart attack Paternal Uncle     Coronary artery disease Paternal Uncle         CABG       Meds/Allergies       Current Outpatient Medications:     albuterol (PROVENTIL HFA,VENTOLIN HFA) 90 mcg/act inhaler    amLODIPine (NORVASC) 5 mg tablet    atorvastatin (LIPITOR) 20 mg tablet    Blood Glucose Monitoring Suppl (OneTouch Verio) w/Device KIT     cholecalciferol (VITAMIN D3) 1,000 units tablet    cyclobenzaprine (FLEXERIL) 10 mg tablet    fexofenadine (ALLEGRA) 30 MG tablet    fluticasone (FLONASE) 50 mcg/act nasal spray    glucose blood test strip    lisinopril (ZESTRIL) 10 mg tablet    metFORMIN (GLUCOPHAGE) 500 mg tablet    metFORMIN (GLUCOPHAGE-XR) 500 mg 24 hr tablet    montelukast (SINGULAIR) 10 mg tablet    moxifloxacin (VIGAMOX) 0.5 % ophthalmic solution    Multiple Vitamin (MULTI-DAY VITAMINS) TABS    Allergies   Allergen Reactions    Cefaclor Hives           Objective     There were no vitals taken for this visit. There is no height or weight on file to calculate BMI.        PHYSICAL EXAM:      General Appearance:   Alert, cooperative, no distress   HEENT:   Normocephalic, atraumatic, anicteric.     Neck:  Supple, symmetrical, trachea midline   Lungs:   Clear to auscultation bilaterally; no rales, rhonchi or wheezing; respirations unlabored    Heart::   Regular rate and rhythm; no murmur, rub, or gallop.   Abdomen:   Soft, non-tender, non-distended; normal bowel sounds; no masses, no organomegaly    Genitalia:   Deferred    Rectal:   Deferred    Extremities:  No cyanosis, clubbing or edema    Pulses:  2+ and symmetric    Skin:  No jaundice, rashes, or lesions    Lymph nodes:  No palpable cervical lymphadenopathy        Lab Results:   No visits with results within 1 Day(s) from this visit.   Latest known visit with results is:   Appointment on 11/18/2023   Component Date Value    Hemoglobin A1C 11/18/2023 6.6 (H)     EAG 11/18/2023 143     Cholesterol 11/18/2023 218 (H)     Triglycerides 11/18/2023 422 (H)     HDL, Direct 11/18/2023 27 (L)     LDL Calculated 11/18/2023      Non-HDL-Chol (CHOL-HDL) 11/18/2023 191     PTH 11/18/2023 150.4 (H)     Calcium 11/18/2023 11.1 (H)     Albumin 11/18/2023 4.2     Vit D, 25-Hydroxy 11/18/2023 15.4 (L)     Creatinine, 24H Ur 11/19/2023 1.5     TOTAL URINE VOLUME 11/19/2023 2,500     24H Urine Volume 11/19/2023  2,500     Calcium, 24H Urine 11/19/2023 300          Radiology Results:   No results found.

## 2024-10-18 DIAGNOSIS — E11.9 DIABETES MELLITUS, NEW ONSET (HCC): ICD-10-CM

## 2024-10-19 ENCOUNTER — APPOINTMENT (OUTPATIENT)
Dept: LAB | Age: 44
End: 2024-10-19
Payer: COMMERCIAL

## 2024-10-19 DIAGNOSIS — E78.5 HYPERLIPIDEMIA, UNSPECIFIED HYPERLIPIDEMIA TYPE: ICD-10-CM

## 2024-10-19 DIAGNOSIS — E83.52 HYPERCALCEMIA: ICD-10-CM

## 2024-10-19 DIAGNOSIS — R19.4 CHANGE IN BOWEL HABITS: ICD-10-CM

## 2024-10-19 LAB
ALBUMIN SERPL BCG-MCNC: 4.4 G/DL (ref 3.5–5)
ALP SERPL-CCNC: 73 U/L (ref 34–104)
ALT SERPL W P-5'-P-CCNC: 40 U/L (ref 7–52)
ANION GAP SERPL CALCULATED.3IONS-SCNC: 10 MMOL/L (ref 4–13)
AST SERPL W P-5'-P-CCNC: 20 U/L (ref 13–39)
BASOPHILS # BLD AUTO: 0.12 THOUSANDS/ΜL (ref 0–0.1)
BASOPHILS NFR BLD AUTO: 2 % (ref 0–1)
BILIRUB SERPL-MCNC: 0.52 MG/DL (ref 0.2–1)
BUN SERPL-MCNC: 13 MG/DL (ref 5–25)
CALCIUM SERPL-MCNC: 10.8 MG/DL (ref 8.4–10.2)
CHLORIDE SERPL-SCNC: 102 MMOL/L (ref 96–108)
CO2 SERPL-SCNC: 25 MMOL/L (ref 21–32)
CREAT SERPL-MCNC: 0.71 MG/DL (ref 0.6–1.3)
EOSINOPHIL # BLD AUTO: 0.21 THOUSAND/ΜL (ref 0–0.61)
EOSINOPHIL NFR BLD AUTO: 3 % (ref 0–6)
ERYTHROCYTE [DISTWIDTH] IN BLOOD BY AUTOMATED COUNT: 12.8 % (ref 11.6–15.1)
GFR SERPL CREATININE-BSD FRML MDRD: 115 ML/MIN/1.73SQ M
GLUCOSE P FAST SERPL-MCNC: 296 MG/DL (ref 65–99)
HCT VFR BLD AUTO: 49.6 % (ref 36.5–49.3)
HGB BLD-MCNC: 17 G/DL (ref 12–17)
IMM GRANULOCYTES # BLD AUTO: 0.03 THOUSAND/UL (ref 0–0.2)
IMM GRANULOCYTES NFR BLD AUTO: 0 % (ref 0–2)
LYMPHOCYTES # BLD AUTO: 2.22 THOUSANDS/ΜL (ref 0.6–4.47)
LYMPHOCYTES NFR BLD AUTO: 29 % (ref 14–44)
MCH RBC QN AUTO: 32.3 PG (ref 26.8–34.3)
MCHC RBC AUTO-ENTMCNC: 34.3 G/DL (ref 31.4–37.4)
MCV RBC AUTO: 94 FL (ref 82–98)
MONOCYTES # BLD AUTO: 0.54 THOUSAND/ΜL (ref 0.17–1.22)
MONOCYTES NFR BLD AUTO: 7 % (ref 4–12)
NEUTROPHILS # BLD AUTO: 4.64 THOUSANDS/ΜL (ref 1.85–7.62)
NEUTS SEG NFR BLD AUTO: 59 % (ref 43–75)
NRBC BLD AUTO-RTO: 0 /100 WBCS
PLATELET # BLD AUTO: 171 THOUSANDS/UL (ref 149–390)
PMV BLD AUTO: 11.8 FL (ref 8.9–12.7)
POTASSIUM SERPL-SCNC: 4.3 MMOL/L (ref 3.5–5.3)
PROT SERPL-MCNC: 7.6 G/DL (ref 6.4–8.4)
RBC # BLD AUTO: 5.27 MILLION/UL (ref 3.88–5.62)
SODIUM SERPL-SCNC: 137 MMOL/L (ref 135–147)
TSH SERPL DL<=0.05 MIU/L-ACNC: 1.44 UIU/ML (ref 0.45–4.5)
WBC # BLD AUTO: 7.76 THOUSAND/UL (ref 4.31–10.16)

## 2024-10-19 PROCEDURE — 86334 IMMUNOFIX E-PHORESIS SERUM: CPT

## 2024-10-19 PROCEDURE — 84165 PROTEIN E-PHORESIS SERUM: CPT

## 2024-10-19 PROCEDURE — 84443 ASSAY THYROID STIM HORMONE: CPT

## 2024-10-19 PROCEDURE — 80053 COMPREHEN METABOLIC PANEL: CPT

## 2024-10-19 PROCEDURE — 85025 COMPLETE CBC W/AUTO DIFF WBC: CPT

## 2024-10-19 PROCEDURE — 36415 COLL VENOUS BLD VENIPUNCTURE: CPT

## 2024-10-19 PROCEDURE — 84166 PROTEIN E-PHORESIS/URINE/CSF: CPT

## 2024-10-21 RX ORDER — METFORMIN HYDROCHLORIDE 500 MG/1
500 TABLET, EXTENDED RELEASE ORAL 2 TIMES DAILY WITH MEALS
Qty: 180 TABLET | Refills: 0 | Status: SHIPPED | OUTPATIENT
Start: 2024-10-21 | End: 2024-10-24 | Stop reason: SDUPTHER

## 2024-10-22 LAB
ALBUMIN SERPL ELPH-MCNC: 4.32 G/DL (ref 3.2–5.1)
ALBUMIN SERPL ELPH-MCNC: 60.9 % (ref 48–70)
ALBUMIN UR ELPH-MCNC: 100 %
ALPHA1 GLOB MFR UR ELPH: 0 %
ALPHA1 GLOB SERPL ELPH-MCNC: 0.23 G/DL (ref 0.15–0.47)
ALPHA1 GLOB SERPL ELPH-MCNC: 3.2 % (ref 1.8–7)
ALPHA2 GLOB MFR UR ELPH: 0 %
ALPHA2 GLOB SERPL ELPH-MCNC: 0.97 G/DL (ref 0.42–1.04)
ALPHA2 GLOB SERPL ELPH-MCNC: 13.6 % (ref 5.9–14.9)
B-GLOBULIN MFR UR ELPH: 0 %
BETA GLOB ABNORMAL SERPL ELPH-MCNC: 0.46 G/DL (ref 0.31–0.57)
BETA1 GLOB SERPL ELPH-MCNC: 6.5 % (ref 4.7–7.7)
BETA2 GLOB SERPL ELPH-MCNC: 5.9 % (ref 3.1–7.9)
BETA2+GAMMA GLOB SERPL ELPH-MCNC: 0.42 G/DL (ref 0.2–0.58)
GAMMA GLOB ABNORMAL SERPL ELPH-MCNC: 0.7 G/DL (ref 0.4–1.66)
GAMMA GLOB MFR UR ELPH: 0 %
GAMMA GLOB SERPL ELPH-MCNC: 9.9 % (ref 6.9–22.3)
IGG/ALB SER: 1.56 {RATIO} (ref 1.1–1.8)
INTERPRETATION UR IFE-IMP: NORMAL
PROT PATTERN SERPL ELPH-IMP: NORMAL
PROT PATTERN UR ELPH-IMP: NORMAL
PROT SERPL-MCNC: 7.1 G/DL (ref 6.4–8.2)
PROT UR-MCNC: 7.9 MG/DL

## 2024-10-22 PROCEDURE — 86334 IMMUNOFIX E-PHORESIS SERUM: CPT | Performed by: PATHOLOGY

## 2024-10-22 PROCEDURE — 84165 PROTEIN E-PHORESIS SERUM: CPT | Performed by: PATHOLOGY

## 2024-10-22 PROCEDURE — 84166 PROTEIN E-PHORESIS/URINE/CSF: CPT | Performed by: PATHOLOGY

## 2024-10-24 ENCOUNTER — OFFICE VISIT (OUTPATIENT)
Dept: ENDOCRINOLOGY | Facility: CLINIC | Age: 44
End: 2024-10-24
Payer: COMMERCIAL

## 2024-10-24 VITALS
SYSTOLIC BLOOD PRESSURE: 140 MMHG | BODY MASS INDEX: 29.3 KG/M2 | HEART RATE: 94 BPM | OXYGEN SATURATION: 99 % | DIASTOLIC BLOOD PRESSURE: 88 MMHG | WEIGHT: 240.6 LBS | HEIGHT: 76 IN

## 2024-10-24 DIAGNOSIS — Z79.4 CURRENT USE OF INSULIN (HCC): ICD-10-CM

## 2024-10-24 DIAGNOSIS — E11.9 DIABETES MELLITUS, NEW ONSET (HCC): Primary | ICD-10-CM

## 2024-10-24 DIAGNOSIS — E11.69 TYPE 2 DIABETES MELLITUS WITH OTHER SPECIFIED COMPLICATION, UNSPECIFIED WHETHER LONG TERM INSULIN USE (HCC): ICD-10-CM

## 2024-10-24 LAB — SL AMB POCT HEMOGLOBIN AIC: 11.7 (ref ?–6.5)

## 2024-10-24 PROCEDURE — 83036 HEMOGLOBIN GLYCOSYLATED A1C: CPT | Performed by: INTERNAL MEDICINE

## 2024-10-24 PROCEDURE — 99214 OFFICE O/P EST MOD 30 MIN: CPT | Performed by: INTERNAL MEDICINE

## 2024-10-24 RX ORDER — INSULIN GLARGINE 300 U/ML
INJECTION, SOLUTION SUBCUTANEOUS
Qty: 4.5 ML | Refills: 2 | Status: SHIPPED | OUTPATIENT
Start: 2024-10-24

## 2024-10-24 RX ORDER — PEN NEEDLE, DIABETIC 32GX 5/32"
NEEDLE, DISPOSABLE MISCELLANEOUS
Qty: 90 EACH | Refills: 2 | Status: SHIPPED | OUTPATIENT
Start: 2024-10-24

## 2024-10-24 RX ORDER — ACYCLOVIR 400 MG/1
1 TABLET ORAL
Qty: 3 EACH | Refills: 5 | Status: SHIPPED | OUTPATIENT
Start: 2024-10-24

## 2024-10-24 RX ORDER — ACYCLOVIR 400 MG/1
1 TABLET ORAL DAILY
Qty: 1 EACH | Refills: 0 | Status: SHIPPED | OUTPATIENT
Start: 2024-10-24

## 2024-10-24 RX ORDER — METFORMIN HYDROCHLORIDE 500 MG/1
TABLET, EXTENDED RELEASE ORAL
Qty: 90 TABLET | Refills: 2 | Status: SHIPPED | OUTPATIENT
Start: 2024-10-24

## 2024-10-24 NOTE — PROGRESS NOTES
Est Patient Progress Note      Chief Complaint   Patient presents with    Diabetes Type 2      Referring Provider  No referring provider defined for this encounter.     History of Present Illness:   Sander Branch is a 44 y.o. male with a history of type 2 diabetes  seen in f/u. Last in the office with Joann LITTLEJOHN in 2023.  He had better glucose control but now his Bgs are markedly higher.     DM history: He presented to the ED on 23 after checking his Bg at home ramona  Family member's meter and seeing a Bg of >500. In the ED, he had a serum glucose of 515 and A1c 10.9%. He was not admitted and given metformin 500mg twice daily and some fluids. At the time of his presentation, he noted leg cramping and lightheadedness. He had increased thirst and urination for about 3 weeks, and also lost 18# rapidly in about 3 weeks. Mother and her family with diabetes.  Denies recent illness or hospitalizations.          Current regimen: metformin ER 500mg twice daily  Has some increased BMs using metformin, but this is better with extended release. Now also having problems with acid reflux and seeing GI.     further diabetic ROS: feels urination and thirst has improved.     Recalled home blood glucose readings:   Bgs are checked and are post meals. 230 and as high as 408      Diabetes education: No  Diet:  3 meals per day, a few snacks per day. Snack will be vegetables. Will use brown rice and will have lower carb breads. His mother has diabetes and he has been working with her diet. Stopped drinking sodas entirely.  Activity: not exercising regularly. Work is sedentary but darryn walk for 10mins at lunch for work.          Has hypertension since his 20s: followed by PCP; on ACE inhibitor and amlodipine  Father  of MI    Thyroid disorders: none  History of pancreatitis: denies pancreatitis; alcohol a few per month; no hx of hypertrigs or gallbladder issues.    Patient Active Problem List   Diagnosis     Abnormal LFTs    Essential hypertension    Vitamin D deficiency    Fatty liver    Degenerative lumbar disc    Chronic rhinitis    COVID-19    Seasonal allergies    Annual physical exam    Newly diagnosed diabetes (HCC)    Hammer toes of both feet    Inguinal lymphadenopathy    Class 1 obesity with body mass index (BMI) of 32.0 to 32.9 in adult    Hypercalcemia    Type 2 diabetes mellitus with other specified complication (HCC)    Hyperlipidemia      Past Medical History:   Diagnosis Date    Acute bronchiolitis with bronchospasm     LAST ASSESSED: 18    COVID-19 10/14/2021    Hypertension       Past Surgical History:   Procedure Laterality Date    [facility]ILLECTOMY  2013    [facility]      Family History   Problem Relation Age of Onset    Hypertension Mother     Thyroid nodules Mother     Ulcerative colitis Mother     Diabetes unspecified Mother     Thyroid disease unspecified Mother     Hypertension Father     Heart disease Maternal Grandfather         CARDIAC DISORDER    Heart disease Paternal Grandmother         CARDIAC DISORDER    Hypertension Paternal Grandmother     Kidney failure Paternal Grandmother         RENAL    Heart attack Paternal Grandfather 38    Heart failure Paternal Grandfather     Coronary artery disease Paternal Aunt     Heart attack Paternal Aunt     Heart attack Paternal Uncle     Coronary artery disease Paternal Uncle         CABG     Social History     Tobacco Use    Smoking status: Former     Current packs/day: 0.00     Average packs/day: 0.5 packs/day for 20.0 years (10.0 ttl pk-yrs)     Types: Cigarettes     Start date:      Quit date: 2020     Years since quittin.8    Smokeless tobacco: Never    Tobacco comments:         Substance Use Topics    Alcohol use: Yes     Comment: soc     Allergies   Allergen Reactions    Cefaclor Hives         Current Outpatient Medications:     albuterol (PROVENTIL HFA,VENTOLIN HFA) 90 mcg/act inhaler, Inhale 2 puffs every 4 (four)  hours as needed for wheezing, Disp: 6.7 Inhaler, Rfl: 0    amLODIPine (NORVASC) 5 mg tablet, TAKE 1 TABLET (5 MG TOTAL) BY MOUTH DAILY., Disp: 90 tablet, Rfl: 2    atorvastatin (LIPITOR) 20 mg tablet, TAKE 1 TABLET BY MOUTH EVERY DAY, Disp: 90 tablet, Rfl: 1    Blood Glucose Monitoring Suppl (OneTouch Verio) w/Device KIT, Use 1 Device 3 (three) times a day, Disp: 1 kit, Rfl: 0    cholecalciferol (VITAMIN D3) 1,000 units tablet, Take 1,000 Units by mouth daily, Disp: , Rfl:     Continuous Glucose  (Dexcom G7 ) JULIANN, Use 1 each in the morning, Disp: 1 each, Rfl: 0    Continuous Glucose Sensor (Dexcom G7 Sensor), Use 1 Device every 10 days, Disp: 3 each, Rfl: 5    cyclobenzaprine (FLEXERIL) 10 mg tablet, Take 1 tablet (10 mg total) by mouth daily at bedtime, Disp: 15 tablet, Rfl: 1    fexofenadine (ALLEGRA) 30 MG tablet, Take 30 mg by mouth daily, Disp: , Rfl:     fluticasone (FLONASE) 50 mcg/act nasal spray, 1 spray into each nostril daily, Disp: , Rfl:     glucose blood test strip, Use 1 each 3 (three) times a day Verio - Use to test 3x daily, Disp: 300 each, Rfl: 1    insulin glargine (Toujeo SoloStar) 300 units/mL CONCENTRATED U-300 injection pen (1-unit dial), Inject 16units subcut once daily, Disp: 4.5 mL, Rfl: 2    Insulin Pen Needle (BD Pen Needle Brenda U/F) 32G X 4 MM MISC, Use daily with insulin pens, Disp: 90 each, Rfl: 2    lisinopril (ZESTRIL) 10 mg tablet, TAKE 1 TABLET BY MOUTH EVERY DAY, Disp: 90 tablet, Rfl: 1    metFORMIN (GLUCOPHAGE-XR) 500 mg 24 hr tablet, Take three pills daily, Disp: 90 tablet, Rfl: 2    montelukast (SINGULAIR) 10 mg tablet, TAKE 1 TABLET BY MOUTH DAILY AT BEDTIME, Disp: 90 tablet, Rfl: 1    Multiple Vitamin (MULTI-DAY VITAMINS) TABS, Take by mouth, Disp: , Rfl:     omeprazole (PriLOSEC) 40 MG capsule, Take 1 capsule (40 mg total) by mouth daily 30 minutes before breakfast, Disp: 90 capsule, Rfl: 2    moxifloxacin (VIGAMOX) 0.5 % ophthalmic solution, Administer 1  "drop to both eyes 3 (three) times a day (Patient not taking: Reported on 9/27/2023), Disp: 3 mL, Rfl: 0  Review of Systems   Constitutional:  Negative for unexpected weight change.   HENT:  Negative for hearing loss, trouble swallowing and voice change.    Eyes:  Negative for visual disturbance.   Gastrointestinal:  Negative for constipation and diarrhea.        Acid reflux   Endocrine: Negative for polydipsia and polyuria.   Musculoskeletal:  Negative for arthralgias and myalgias.   Skin:         Denies changes in hair/skin/nails   Neurological:  Negative for tremors, weakness and numbness.   Psychiatric/Behavioral:  The patient is not nervous/anxious.        Physical Exam:  Body mass index is 29.29 kg/m².  /88   Pulse 94   Ht 6' 4\" (1.93 m)   Wt 109 kg (240 lb 9.6 oz)   SpO2 99%   BMI 29.29 kg/m²    Wt Readings from Last 3 Encounters:   10/24/24 109 kg (240 lb 9.6 oz)   10/16/24 110 kg (241 lb 9.6 oz)   11/14/23 129 kg (284 lb 9.6 oz)         Physical Exam   Gen: appears well-developed and well-nourished. No apparent distress.   Head: Normocephalic and atraumatic.   Eyes: no stare or proptosis, no periorbital edema  E/N/M nl facies, hearing grossly intact  Neck: range of motion nl.   Pulmonary/Chest: breathing  comfortably, no accessory muscle use, effort normal.   Musculoskeletal: moves all 4 extremities, gait nl  Neurological: alert and oriented to person, place, and time. No upper ext tremor appreciated  Skin: does not appear diaphoretic, no facial plethora  Psychiatric: normal mood and affect; behavior is normal; no gross lapses in memory, answer questions appropriately        Labs:   Lab Results   Component Value Date    HGBA1C 11.7 (A) 10/24/2024          Lab Results   Component Value Date    CREATININE 0.71 10/19/2024    CREATININE 0.93 06/18/2023    CREATININE 1.10 04/27/2023    BUN 13 10/19/2024    K 4.3 10/19/2024     10/19/2024    CO2 25 10/19/2024     eGFR   Date Value Ref Range Status "   10/19/2024 115 ml/min/1.73sq m Final             Lab Results   Component Value Date    ALT 40 10/19/2024    AST 20 10/19/2024    ALKPHOS 73 10/19/2024           Impression/Plan:      Problem List Items Addressed This Visit       Type 2 diabetes mellitus with other specified complication (HCC)       Lab Results   Component Value Date    HGBA1C 11.7 (A) 10/24/2024       A1c nad FSBGs are much higher. He does report some dietary indiscretions, but I will also check JOVANY, IA2, and insulin antibodies as well as a c peptide to see if these would . He is agreeable. He will try to increase buy and additional 500mg of metformin if he does not have GI issues. Given hyperglycemia, I recommend starting Toujeo 16units daily until Bgs improve and testing returns. Discussed CGM which interests him. RX all sent         Relevant Medications    insulin glargine (Toujeo SoloStar) 300 units/mL CONCENTRATED U-300 injection pen (1-unit dial)    Insulin Pen Needle (BD Pen Needle Brenda U/F) 32G X 4 MM MISC    Continuous Glucose  (Dexcom G7 ) JULIANN    Continuous Glucose Sensor (Dexcom G7 Sensor)    metFORMIN (GLUCOPHAGE-XR) 500 mg 24 hr tablet     Other Visit Diagnoses       Diabetes mellitus, new onset (HCC)    -  Primary    Relevant Medications    insulin glargine (Toujeo SoloStar) 300 units/mL CONCENTRATED U-300 injection pen (1-unit dial)    metFORMIN (GLUCOPHAGE-XR) 500 mg 24 hr tablet    Other Relevant Orders    POCT hemoglobin A1c (Completed)    GAD65, IA-2, and Insulin Autoantibody    C-peptide    Albumin / creatinine urine ratio    Basic metabolic panel    Hemoglobin A1C    Current use of insulin (HCC)        Relevant Medications    insulin glargine (Toujeo SoloStar) 300 units/mL CONCENTRATED U-300 injection pen (1-unit dial)    Insulin Pen Needle (BD Pen Needle Brenda U/F) 32G X 4 MM MISC    Continuous Glucose  (Dexcom G7 ) JULIANN    Continuous Glucose Sensor (Dexcom G7 Sensor)             Discussed with the patient and all questioned fully answered. He will call me if any problems arise.    Counseled patient on diagnostic results, prognosis, risk and benefit of treatment options, instruction for management, importance of treatment compliance, Risk  factor reduction and impressions      Gosia Fisher MD

## 2024-10-24 NOTE — ASSESSMENT & PLAN NOTE
Lab Results   Component Value Date    HGBA1C 11.7 (A) 10/24/2024       A1c nad FSBGs are much higher. He does report some dietary indiscretions, but I will also check JOVANY, IA2, and insulin antibodies as well as a c peptide to see if these would . He is agreeable. He will try to increase buy and additional 500mg of metformin if he does not have GI issues. Given hyperglycemia, I recommend starting Toujeo 16units daily until Bgs improve and testing returns. Discussed CGM which interests him. RX all sent

## 2024-10-26 DIAGNOSIS — I10 ESSENTIAL HYPERTENSION: ICD-10-CM

## 2024-10-27 ENCOUNTER — APPOINTMENT (OUTPATIENT)
Dept: LAB | Age: 44
End: 2024-10-27
Payer: COMMERCIAL

## 2024-10-27 LAB
CHOLEST SERPL-MCNC: 202 MG/DL
HDLC SERPL-MCNC: 24 MG/DL
NONHDLC SERPL-MCNC: 178 MG/DL
TRIGL SERPL-MCNC: 584 MG/DL

## 2024-10-27 PROCEDURE — 80061 LIPID PANEL: CPT

## 2024-10-27 PROCEDURE — 86337 INSULIN ANTIBODIES: CPT | Performed by: INTERNAL MEDICINE

## 2024-10-27 PROCEDURE — 84681 ASSAY OF C-PEPTIDE: CPT | Performed by: INTERNAL MEDICINE

## 2024-10-27 PROCEDURE — 86341 ISLET CELL ANTIBODY: CPT | Performed by: INTERNAL MEDICINE

## 2024-10-27 PROCEDURE — 36415 COLL VENOUS BLD VENIPUNCTURE: CPT | Performed by: INTERNAL MEDICINE

## 2024-10-28 RX ORDER — AMLODIPINE BESYLATE 5 MG/1
5 TABLET ORAL DAILY
Qty: 90 TABLET | Refills: 0 | Status: SHIPPED | OUTPATIENT
Start: 2024-10-28

## 2024-10-29 LAB — C PEPTIDE SERPL-MCNC: 3.2 NG/ML (ref 1.1–4.4)

## 2024-10-31 ENCOUNTER — TELEPHONE (OUTPATIENT)
Age: 44
End: 2024-10-31

## 2024-11-01 ENCOUNTER — TELEPHONE (OUTPATIENT)
Age: 44
End: 2024-11-01

## 2024-11-01 RX ORDER — ALBUTEROL SULFATE 0.83 MG/ML
2.5 SOLUTION RESPIRATORY (INHALATION) ONCE AS NEEDED
Status: CANCELLED | OUTPATIENT
Start: 2024-11-01

## 2024-11-01 RX ORDER — ONDANSETRON 2 MG/ML
4 INJECTION INTRAMUSCULAR; INTRAVENOUS ONCE AS NEEDED
Status: CANCELLED | OUTPATIENT
Start: 2024-11-01

## 2024-11-01 RX ORDER — SODIUM CHLORIDE 9 MG/ML
125 INJECTION, SOLUTION INTRAVENOUS CONTINUOUS
Status: CANCELLED | OUTPATIENT
Start: 2024-11-01

## 2024-11-01 NOTE — TELEPHONE ENCOUNTER
Patients GI provider:  KADEEM Villanueva    Number to return call: [phone]    Reason for call: Pt calling to request prep change to DALJIT/DUL. Please review w/doc to see if ok to change to this prep, call pt back to advise.     Scheduled procedure/appointment date if applicable: procedure 11/04/2024

## 2024-11-04 ENCOUNTER — HOSPITAL ENCOUNTER (OUTPATIENT)
Dept: GASTROENTEROLOGY | Facility: HOSPITAL | Age: 44
Setting detail: OUTPATIENT SURGERY
Discharge: HOME | End: 2024-11-04
Attending: INTERNAL MEDICINE
Payer: COMMERCIAL

## 2024-11-04 ENCOUNTER — ANESTHESIA EVENT (OUTPATIENT)
Dept: GASTROENTEROLOGY | Facility: HOSPITAL | Age: 44
End: 2024-11-04
Payer: COMMERCIAL

## 2024-11-04 ENCOUNTER — ANESTHESIA (OUTPATIENT)
Dept: GASTROENTEROLOGY | Facility: HOSPITAL | Age: 44
End: 2024-11-04
Payer: COMMERCIAL

## 2024-11-04 VITALS
DIASTOLIC BLOOD PRESSURE: 67 MMHG | SYSTOLIC BLOOD PRESSURE: 109 MMHG | RESPIRATION RATE: 16 BRPM | WEIGHT: 240 LBS | BODY MASS INDEX: 29.22 KG/M2 | HEIGHT: 76 IN | TEMPERATURE: 98 F | OXYGEN SATURATION: 98 % | HEART RATE: 80 BPM

## 2024-11-04 DIAGNOSIS — R13.10 DYSPHAGIA, UNSPECIFIED TYPE: ICD-10-CM

## 2024-11-04 DIAGNOSIS — R19.4 CHANGE IN BOWEL HABITS: ICD-10-CM

## 2024-11-04 LAB — GLUCOSE SERPL-MCNC: 245 MG/DL (ref 65–140)

## 2024-11-04 PROCEDURE — 82948 REAGENT STRIP/BLOOD GLUCOSE: CPT

## 2024-11-04 PROCEDURE — 45385 COLONOSCOPY W/LESION REMOVAL: CPT | Performed by: INTERNAL MEDICINE

## 2024-11-04 PROCEDURE — 43239 EGD BIOPSY SINGLE/MULTIPLE: CPT | Performed by: INTERNAL MEDICINE

## 2024-11-04 PROCEDURE — 88305 TISSUE EXAM BY PATHOLOGIST: CPT | Performed by: PATHOLOGY

## 2024-11-04 PROCEDURE — 88342 IMHCHEM/IMCYTCHM 1ST ANTB: CPT | Performed by: PATHOLOGY

## 2024-11-04 RX ORDER — LIDOCAINE HYDROCHLORIDE 20 MG/ML
INJECTION, SOLUTION EPIDURAL; INFILTRATION; INTRACAUDAL; PERINEURAL AS NEEDED
Status: DISCONTINUED | OUTPATIENT
Start: 2024-11-04 | End: 2024-11-04

## 2024-11-04 RX ORDER — SODIUM CHLORIDE 9 MG/ML
125 INJECTION, SOLUTION INTRAVENOUS CONTINUOUS
Status: DISCONTINUED | OUTPATIENT
Start: 2024-11-04 | End: 2024-11-08 | Stop reason: HOSPADM

## 2024-11-04 RX ORDER — ONDANSETRON 2 MG/ML
4 INJECTION INTRAMUSCULAR; INTRAVENOUS ONCE AS NEEDED
Status: DISCONTINUED | OUTPATIENT
Start: 2024-11-04 | End: 2024-11-08 | Stop reason: HOSPADM

## 2024-11-04 RX ORDER — ALBUTEROL SULFATE 0.83 MG/ML
2.5 SOLUTION RESPIRATORY (INHALATION) ONCE AS NEEDED
Status: DISCONTINUED | OUTPATIENT
Start: 2024-11-04 | End: 2024-11-08 | Stop reason: HOSPADM

## 2024-11-04 RX ORDER — PROPOFOL 10 MG/ML
INJECTION, EMULSION INTRAVENOUS CONTINUOUS PRN
Status: DISCONTINUED | OUTPATIENT
Start: 2024-11-04 | End: 2024-11-04

## 2024-11-04 RX ORDER — PROPOFOL 10 MG/ML
INJECTION, EMULSION INTRAVENOUS AS NEEDED
Status: DISCONTINUED | OUTPATIENT
Start: 2024-11-04 | End: 2024-11-04

## 2024-11-04 RX ADMIN — PROPOFOL 50 MG: 10 INJECTION, EMULSION INTRAVENOUS at 12:46

## 2024-11-04 RX ADMIN — LIDOCAINE HYDROCHLORIDE 100 MG: 20 INJECTION, SOLUTION EPIDURAL; INFILTRATION; INTRACAUDAL at 12:45

## 2024-11-04 RX ADMIN — PROPOFOL 50 MCG/KG/MIN: 10 INJECTION, EMULSION INTRAVENOUS at 13:07

## 2024-11-04 RX ADMIN — PROPOFOL 30 MG: 10 INJECTION, EMULSION INTRAVENOUS at 12:48

## 2024-11-04 RX ADMIN — PROPOFOL 50 MG: 10 INJECTION, EMULSION INTRAVENOUS at 12:51

## 2024-11-04 RX ADMIN — SODIUM CHLORIDE: 0.9 INJECTION, SOLUTION INTRAVENOUS at 13:17

## 2024-11-04 RX ADMIN — PROPOFOL 150 MCG/KG/MIN: 10 INJECTION, EMULSION INTRAVENOUS at 12:48

## 2024-11-04 RX ADMIN — PROPOFOL 30 MG: 10 INJECTION, EMULSION INTRAVENOUS at 13:21

## 2024-11-04 RX ADMIN — PROPOFOL 150 MG: 10 INJECTION, EMULSION INTRAVENOUS at 12:45

## 2024-11-04 RX ADMIN — SODIUM CHLORIDE 125 ML/HR: 0.9 INJECTION, SOLUTION INTRAVENOUS at 12:22

## 2024-11-04 RX ADMIN — PROPOFOL 20 MG: 10 INJECTION, EMULSION INTRAVENOUS at 13:18

## 2024-11-04 NOTE — ANESTHESIA PREPROCEDURE EVALUATION
Procedure:  COLONOSCOPY  EGD    Relevant Problems   ANESTHESIA (within normal limits)      CARDIO   (+) Essential hypertension   (+) Hyperlipidemia      ENDO   (+) Type 2 diabetes mellitus with other specified complication (HCC)      GI/HEPATIC   (+) Fatty liver      /RENAL (within normal limits)      GYN (within normal limits)      HEMATOLOGY (within normal limits)      MUSCULOSKELETAL   (+) Degenerative lumbar disc      NEURO/PSYCH (within normal limits)      PULMONARY (within normal limits)      Other   (+) Inguinal lymphadenopathy        Physical Exam    Airway    Mallampati score: III  TM Distance: >3 FB  Neck ROM: full     Dental   No notable dental hx     Cardiovascular  Rhythm: regular, Rate: normal, Cardiovascular exam normal    Pulmonary  Pulmonary exam normal Breath sounds clear to auscultation    Other Findings        Anesthesia Plan  ASA Score- 2     Anesthesia Type- general with ASA Monitors.         Additional Monitors:     Airway Plan:            Plan Factors-Exercise tolerance (METS): >4 METS.    Chart reviewed.   Existing labs reviewed. Patient summary reviewed.    Patient is not a current smoker.              Induction- intravenous.    Postoperative Plan-         Informed Consent- Anesthetic plan and risks discussed with patient.

## 2024-11-04 NOTE — INTERVAL H&P NOTE
H&P reviewed. After examining the patient I find no changes in the patients condition since the H&P had been written.  Vital signs were reviewed prior to procedure.

## 2024-11-04 NOTE — ANESTHESIA POSTPROCEDURE EVALUATION
Post-Op Assessment Note    CV Status:  Stable    Pain management: adequate       Mental Status:  Alert and awake   Hydration Status:  Euvolemic   PONV Controlled:  Controlled   Airway Patency:  Patent     Post Op Vitals Reviewed: Yes    No anethesia notable event occurred.    Staff: Anesthesiologist, CRNA           Last Filed PACU Vitals:  Vitals Value Taken Time   Temp 98 °F (36.7 °C) 11/04/24 1336   Pulse 80 11/04/24 1351   /67 11/04/24 1351   Resp 16 11/04/24 1351   SpO2 98 % 11/04/24 1351       Modified Bob:  Activity: 2 (11/4/2024  1:51 PM)  Respiration: 2 (11/4/2024  1:51 PM)  Circulation: 2 (11/4/2024  1:51 PM)  Consciousness: 2 (11/4/2024  1:51 PM)  Oxygen Saturation: 2 (11/4/2024  1:51 PM)  Modified Bob Score: 10 (11/4/2024  1:51 PM)

## 2024-11-04 NOTE — ANESTHESIA POSTPROCEDURE EVALUATION
Post-Op Assessment Note    CV Status:  Stable  Pain Score: 0    Pain management: adequate       Mental Status:  Alert   Hydration Status:  Stable   PONV Controlled:  None   Airway Patency:  Patent     Post Op Vitals Reviewed: Yes    No anethesia notable event occurred.    Staff: CRNA           Last Filed PACU Vitals:  Vitals Value Taken Time   Temp 98 °F (36.7 °C) 11/04/24 1336   Pulse 73 11/04/24 1336   /58 11/04/24 1336   Resp 16 11/04/24 1336   SpO2 97 % 11/04/24 1336       Modified Bob:  Activity: 2 (11/4/2024 12:05 PM)  Respiration: 2 (11/4/2024 12:05 PM)  Circulation: 2 (11/4/2024 12:05 PM)  Consciousness: 2 (11/4/2024 12:05 PM)  Oxygen Saturation: 2 (11/4/2024 12:05 PM)  Modified Bob Score: 10 (11/4/2024 12:05 PM)

## 2024-11-07 LAB — MISCELLANEOUS LAB TEST RESULT: NORMAL

## 2024-11-08 PROCEDURE — 88305 TISSUE EXAM BY PATHOLOGIST: CPT | Performed by: PATHOLOGY

## 2024-11-08 PROCEDURE — 88342 IMHCHEM/IMCYTCHM 1ST ANTB: CPT | Performed by: PATHOLOGY

## 2024-12-09 ENCOUNTER — OFFICE VISIT (OUTPATIENT)
Dept: FAMILY MEDICINE CLINIC | Facility: CLINIC | Age: 44
End: 2024-12-09
Payer: COMMERCIAL

## 2024-12-09 VITALS
HEIGHT: 76 IN | WEIGHT: 231 LBS | SYSTOLIC BLOOD PRESSURE: 114 MMHG | BODY MASS INDEX: 28.13 KG/M2 | TEMPERATURE: 98 F | HEART RATE: 83 BPM | OXYGEN SATURATION: 98 % | DIASTOLIC BLOOD PRESSURE: 78 MMHG

## 2024-12-09 DIAGNOSIS — Z00.00 ANNUAL PHYSICAL EXAM: ICD-10-CM

## 2024-12-09 DIAGNOSIS — I10 ESSENTIAL HYPERTENSION: Primary | ICD-10-CM

## 2024-12-09 DIAGNOSIS — E11.9 NEWLY DIAGNOSED DIABETES (HCC): ICD-10-CM

## 2024-12-09 PROBLEM — E66.811 CLASS 1 OBESITY WITH BODY MASS INDEX (BMI) OF 32.0 TO 32.9 IN ADULT: Status: RESOLVED | Noted: 2023-08-10 | Resolved: 2024-12-09

## 2024-12-09 LAB
CREAT UR-MCNC: 87.8 MG/DL
MICROALBUMIN UR-MCNC: <7 MG/L

## 2024-12-09 PROCEDURE — 99214 OFFICE O/P EST MOD 30 MIN: CPT | Performed by: INTERNAL MEDICINE

## 2024-12-09 PROCEDURE — 82570 ASSAY OF URINE CREATININE: CPT | Performed by: INTERNAL MEDICINE

## 2024-12-09 PROCEDURE — 99396 PREV VISIT EST AGE 40-64: CPT | Performed by: INTERNAL MEDICINE

## 2024-12-09 PROCEDURE — 82043 UR ALBUMIN QUANTITATIVE: CPT | Performed by: INTERNAL MEDICINE

## 2024-12-09 RX ORDER — HUMAN INSULIN 100 [USP'U]/ML
30 INJECTION, SUSPENSION SUBCUTANEOUS
Qty: 10 ML | Refills: 3 | Status: SHIPPED | OUTPATIENT
Start: 2024-12-09

## 2024-12-09 NOTE — PROGRESS NOTES
Adult Annual Physical  Name: Sander Branhc      : 1980      MRN: [id number]  Encounter Provider: Brooke Kwon MD  Encounter Date: 2024   Encounter department: [facility]    Assessment & Plan  Essential hypertension         Newly diagnosed diabetes (HCC)    Lab Results   Component Value Date    HGBA1C 11.7 (A) 10/24/2024     Orders:    Albumin / creatinine urine ratio    insulin NPH-insulin regular (NovoLIN 70/30) 100 units/mL subcutaneous injection; Inject 30 Units under the skin daily before breakfast    Ambulatory referral to Diabetic Education - use to refer for diabetes group classes, individual diabetes education, medical nutrition therapy, device training; Future    Annual physical exam           Immunizations and preventive care screenings were discussed with patient today. Appropriate education was printed on patient's after visit summary.        Counseling:  Exercise: the importance of regular exercise/physical activity was discussed. Recommend exercise 3-5 times per week for at least 30 minutes.       Depression Screening and Follow-up Plan: Patient was screened for depression during today's encounter. They screened negative with a PHQ-2 score of 0.        History of Present Illness     Adult Annual Physical:  Patient presents for annual physical. Patient's biggest problem is his new onset diabetes following with endocrinology.  Unfortunately his insurance does not necessarily want to pay for everything the endocrinologist would like.  He has had very few low episodes of blood sugar but has had some episodes of high sugar.     Diet and Physical Activity:  - Diet/Nutrition: diabetic diet, heart healthy (low sodium) diet and frequent junk food.  - Exercise: no formal exercise.    Depression Screening:  - PHQ-2 Score: 0    General Health:  - Sleep: sleeps well.  - Hearing: normal hearing bilateral ears.  - Vision: no vision problems and most recent eye exam > 1 year ago.  -  Dental: regular dental visits.     Health:  - History of STDs: no.   - Urinary symptoms: none.     Advanced Care Planning:  - Has an advanced directive?: no    - Has a durable medical POA?: no    - ACP document given to patient?: no      Review of Systems   Constitutional:  Negative for chills and fever.   HENT:  Negative for ear pain and sore throat.    Eyes:  Negative for pain and visual disturbance.   Respiratory:  Negative for cough and shortness of breath.    Cardiovascular:  Negative for chest pain and palpitations.   Gastrointestinal:  Negative for abdominal pain and vomiting.   Genitourinary:  Negative for dysuria and hematuria.   Musculoskeletal:  Negative for arthralgias and back pain.   Skin:  Negative for color change and rash.   Neurological:  Negative for seizures, syncope and numbness.   Psychiatric/Behavioral: Negative.     All other systems reviewed and are negative.    Current Outpatient Medications on File Prior to Visit   Medication Sig Dispense Refill    albuterol (PROVENTIL HFA,VENTOLIN HFA) 90 mcg/act inhaler Inhale 2 puffs every 4 (four) hours as needed for wheezing 6.7 Inhaler 0    amLODIPine (NORVASC) 5 mg tablet TAKE 1 TABLET (5 MG TOTAL) BY MOUTH DAILY. 90 tablet 0    atorvastatin (LIPITOR) 20 mg tablet TAKE 1 TABLET BY MOUTH EVERY DAY 90 tablet 1    Blood Glucose Monitoring Suppl (OneTouch Verio) w/Device KIT Use 1 Device 3 (three) times a day 1 kit 0    cholecalciferol (VITAMIN D3) 1,000 units tablet Take 1,000 Units by mouth daily      Continuous Glucose  (Dexcom G7 ) JULIANN Use 1 each in the morning 1 each 0    Continuous Glucose Sensor (Dexcom G7 Sensor) Use 1 Device every 10 days 3 each 5    cyclobenzaprine (FLEXERIL) 10 mg tablet Take 1 tablet (10 mg total) by mouth daily at bedtime 15 tablet 1    fexofenadine (ALLEGRA) 30 MG tablet Take 30 mg by mouth daily      fluticasone (FLONASE) 50 mcg/act nasal spray 1 spray into each nostril daily      glucose blood test  "strip Use 1 each 3 (three) times a day Verio - Use to test 3x daily 300 each 1    insulin glargine (Toujeo SoloStar) 300 units/mL CONCENTRATED U-300 injection pen (1-unit dial) Inject 16units subcut once daily 4.5 mL 2    Insulin Pen Needle (BD Pen Needle Brenda U/F) 32G X 4 MM MISC Use daily with insulin pens 90 each 2    lisinopril (ZESTRIL) 10 mg tablet TAKE 1 TABLET BY MOUTH EVERY DAY 90 tablet 1    metFORMIN (GLUCOPHAGE-XR) 500 mg 24 hr tablet Take three pills daily 90 tablet 2    montelukast (SINGULAIR) 10 mg tablet TAKE 1 TABLET BY MOUTH DAILY AT BEDTIME 90 tablet 1    Multiple Vitamin (MULTI-DAY VITAMINS) TABS Take by mouth      omeprazole (PriLOSEC) 40 MG capsule Take 1 capsule (40 mg total) by mouth daily 30 minutes before breakfast 90 capsule 2    moxifloxacin (VIGAMOX) 0.5 % ophthalmic solution Administer 1 drop to both eyes 3 (three) times a day (Patient not taking: Reported on 9/27/2023) 3 mL 0     No current facility-administered medications on file prior to visit.        Objective   /78 (BP Location: Left arm, Patient Position: Sitting, Cuff Size: Large)   Pulse 83   Temp 98 °F (36.7 °C) (Temporal)   Ht 6' 4\" (1.93 m)   Wt 105 kg (231 lb)   SpO2 98%   BMI 28.12 kg/m²     Physical Exam  Vitals and nursing note reviewed.   Constitutional:       General: He is not in acute distress.     Appearance: He is well-developed.   HENT:      Head: Normocephalic and atraumatic.   Eyes:      Conjunctiva/sclera: Conjunctivae normal.   Cardiovascular:      Rate and Rhythm: Normal rate and regular rhythm.      Heart sounds: No murmur heard.  Pulmonary:      Effort: Pulmonary effort is normal. No respiratory distress.      Breath sounds: Normal breath sounds.   Abdominal:      Palpations: Abdomen is soft.      Tenderness: There is no abdominal tenderness.   Musculoskeletal:         General: No swelling.      Cervical back: Neck supple.   Skin:     General: Skin is warm and dry.      Capillary Refill: " Capillary refill takes less than 2 seconds.   Neurological:      General: No focal deficit present.      Mental Status: He is alert. Mental status is at baseline.      Cranial Nerves: No cranial nerve deficit.      Sensory: No sensory deficit.   Psychiatric:         Mood and Affect: Mood normal.         Behavior: Behavior normal.         Thought Content: Thought content normal.         Judgment: Judgment normal.

## 2024-12-09 NOTE — ASSESSMENT & PLAN NOTE
Patient recently diagnosed seeing a endocrinologist especially since his insurance is having problems providing him with what was ordered  Lab Results   Component Value Date    HGBA1C 11.7 (A) 10/24/2024       Orders:    Albumin / creatinine urine ratio    insulin NPH-insulin regular (NovoLIN 70/30) 100 units/mL subcutaneous injection; Inject 30 Units under the skin daily before breakfast

## 2024-12-09 NOTE — ASSESSMENT & PLAN NOTE
Lab Results   Component Value Date    HGBA1C 11.7 (A) 10/24/2024     Orders:    Albumin / creatinine urine ratio    insulin NPH-insulin regular (NovoLIN 70/30) 100 units/mL subcutaneous injection; Inject 30 Units under the skin daily before breakfast    Ambulatory referral to Diabetic Education - use to refer for diabetes group classes, individual diabetes education, medical nutrition therapy, device training; Future

## 2024-12-09 NOTE — PATIENT INSTRUCTIONS
"Patient Education     Routine physical for adults   The Basics   Written by the doctors and editors at [facility]   What is a physical? -- A physical is a routine visit, or \"check-up,\" with your doctor. You might also hear it called a \"wellness visit\" or \"preventive visit.\"  During each visit, the doctor will:   Ask about your physical and mental health   Ask about your habits, behaviors, and lifestyle   Do an exam   Give you vaccines if needed   Talk to you about any medicines you take   Give advice about your health   Answer your questions  Getting regular check-ups is an important part of taking care of your health. It can help your doctor find and treat any problems you have. But it's also important for preventing health problems.  A routine physical is different from a \"sick visit.\" A sick visit is when you see a doctor because of a health concern or problem. Since physicals are scheduled ahead of time, you can think about what you want to ask the doctor.  How often should I get a physical? -- It depends on your age and health. In general, for people age 21 years and older:   If you are younger than 50 years, you might be able to get a physical every 3 years.   If you are 50 years or older, your doctor might recommend a physical every year.  If you have an ongoing health condition, like diabetes or high blood pressure, your doctor will probably want to see you more often.  What happens during a physical? -- In general, each visit will include:   Physical exam - The doctor or nurse will check your height, weight, heart rate, and blood pressure. They will also look at your eyes and ears. They will ask about how you are feeling and whether you have any symptoms that bother you.   Medicines - It's a good idea to bring a list of all the medicines you take to each doctor visit. Your doctor will talk to you about your medicines and answer any questions. Tell them if you are having any side effects that bother you. You " "should also tell them if you are having trouble paying for any of your medicines.   Habits and behaviors - This includes:   Your diet   Your exercise habits   Whether you smoke, drink alcohol, or use drugs   Whether you are sexually active   Whether you feel safe at home  Your doctor will talk to you about things you can do to improve your health and lower your risk of health problems. They will also offer help and support. For example, if you want to quit smoking, they can give you advice and might prescribe medicines. If you want to improve your diet or get more physical activity, they can help you with this, too.   Lab tests, if needed - The tests you get will depend on your age and situation. For example, your doctor might want to check your:   Cholesterol   Blood sugar   Iron level   Vaccines - The recommended vaccines will depend on your age, health, and what vaccines you already had. Vaccines are very important because they can prevent certain serious or deadly infections.   Discussion of screening - \"Screening\" means checking for diseases or other health problems before they cause symptoms. Your doctor can recommend screening based on your age, risk, and preferences. This might include tests to check for:   Cancer, such as breast, prostate, cervical, ovarian, colorectal, prostate, lung, or skin cancer   Sexually transmitted infections, such as chlamydia and gonorrhea   Mental health conditions like depression and anxiety  Your doctor will talk to you about the different types of screening tests. They can help you decide which screenings to have. They can also explain what the results might mean.   Answering questions - The physical is a good time to ask the doctor or nurse questions about your health. If needed, they can refer you to other doctors or specialists, too.  Adults older than 65 years often need other care, too. As you get older, your doctor will talk to you about:   How to prevent falling at " home   Hearing or vision tests   Memory testing   How to take your medicines safely   Making sure that you have the help and support you need at home  All topics are updated as new evidence becomes available and our peer review process is complete.  This topic retrieved from [employer] on: May 02, 2024.  Topic 663719 Version 1.0  Release: 32.4.3 - C32.122  © 2024 UpToDate, Inc. and/or its affiliates. All rights reserved.  Consumer Information Use and Disclaimer   Disclaimer: This generalized information is a limited summary of diagnosis, treatment, and/or medication information. It is not meant to be comprehensive and should be used as a tool to help the user understand and/or assess potential diagnostic and treatment options. It does NOT include all information about conditions, treatments, medications, side effects, or risks that may apply to a specific patient. It is not intended to be medical advice or a substitute for the medical advice, diagnosis, or treatment of a health care provider based on the health care provider's examination and assessment of a patient's specific and unique circumstances. Patients must speak with a health care provider for complete information about their health, medical questions, and treatment options, including any risks or benefits regarding use of medications. This information does not endorse any treatments or medications as safe, effective, or approved for treating a specific patient. UpToDate, Inc. and its affiliates disclaim any warranty or liability relating to this information or the use thereof.The use of this information is governed by the Terms of Use, available at https://www.[employer].com/en/know/clinical-effectiveness-terms. 2024© UpToDate, Inc. and its affiliates and/or licensors. All rights reserved.  Copyright   © 2024 UpToDate, Inc. and/or its affiliates. All rights reserved.

## 2024-12-10 ENCOUNTER — TELEPHONE (OUTPATIENT)
Dept: FAMILY MEDICINE CLINIC | Facility: CLINIC | Age: 44
End: 2024-12-10

## 2024-12-10 DIAGNOSIS — E11.9 DIABETES MELLITUS, NEW ONSET (HCC): ICD-10-CM

## 2024-12-11 RX ORDER — METFORMIN HYDROCHLORIDE 500 MG/1
1500 TABLET, EXTENDED RELEASE ORAL DAILY
Qty: 270 TABLET | Refills: 1 | Status: SHIPPED | OUTPATIENT
Start: 2024-12-11

## 2024-12-11 NOTE — TELEPHONE ENCOUNTER
PA for Inuslin NPH-insulin regular (novolin 70/30) 100 units SUBMITTED to [employer]     via    []CMM-KEY:   [x][employer]-Case ID # [id number]   []Availity-Auth ID # NDC #   []Faxed to plan   []Other website   []Phone call Case ID #     []PA sent as URGENT    All office notes, labs and other pertaining documents and studies sent. Clinical questions answered. Awaiting determination from insurance company.     Turnaround time for your insurance to make a decision on your Prior Authorization can take 7-21 business days.

## 2024-12-13 NOTE — TELEPHONE ENCOUNTER
PA for insulin NPH-insulin regular (NovoLIN 70/30) 100  DENIED    Reason:(Screenshot if applicable)        Message sent to office clinical pool Yes    Denial letter scanned into Media Yes    Appeal started No (Provider will need to decide if appeal is warranted and send clinical documentation to Prior Authorization Team for initiation.)    **Please follow up with your patient regarding denial and next steps**

## 2025-01-11 DIAGNOSIS — E78.5 HYPERLIPIDEMIA, UNSPECIFIED HYPERLIPIDEMIA TYPE: ICD-10-CM

## 2025-01-13 RX ORDER — ATORVASTATIN CALCIUM 20 MG/1
20 TABLET, FILM COATED ORAL DAILY
Qty: 90 TABLET | Refills: 1 | Status: SHIPPED | OUTPATIENT
Start: 2025-01-13

## 2025-01-30 ENCOUNTER — OFFICE VISIT (OUTPATIENT)
Dept: ENDOCRINOLOGY | Facility: CLINIC | Age: 45
End: 2025-01-30
Payer: COMMERCIAL

## 2025-01-30 VITALS
WEIGHT: 251.2 LBS | BODY MASS INDEX: 30.59 KG/M2 | SYSTOLIC BLOOD PRESSURE: 128 MMHG | HEIGHT: 76 IN | DIASTOLIC BLOOD PRESSURE: 84 MMHG

## 2025-01-30 DIAGNOSIS — Z79.4 TYPE 2 DIABETES MELLITUS WITH OTHER SPECIFIED COMPLICATION, WITH LONG-TERM CURRENT USE OF INSULIN (HCC): Primary | ICD-10-CM

## 2025-01-30 DIAGNOSIS — E11.9 NEWLY DIAGNOSED DIABETES (HCC): ICD-10-CM

## 2025-01-30 DIAGNOSIS — E21.3 HYPERPARATHYROIDISM (HCC): ICD-10-CM

## 2025-01-30 DIAGNOSIS — I10 ESSENTIAL HYPERTENSION: ICD-10-CM

## 2025-01-30 DIAGNOSIS — E11.69 TYPE 2 DIABETES MELLITUS WITH OTHER SPECIFIED COMPLICATION, WITH LONG-TERM CURRENT USE OF INSULIN (HCC): Primary | ICD-10-CM

## 2025-01-30 DIAGNOSIS — E11.9 DIABETES MELLITUS, NEW ONSET (HCC): ICD-10-CM

## 2025-01-30 DIAGNOSIS — E55.9 VITAMIN D DEFICIENCY: ICD-10-CM

## 2025-01-30 DIAGNOSIS — E78.5 HYPERLIPIDEMIA, UNSPECIFIED HYPERLIPIDEMIA TYPE: ICD-10-CM

## 2025-01-30 LAB — SL AMB POCT HEMOGLOBIN AIC: 10.1 (ref ?–6.5)

## 2025-01-30 PROCEDURE — 83036 HEMOGLOBIN GLYCOSYLATED A1C: CPT | Performed by: PHYSICIAN ASSISTANT

## 2025-01-30 PROCEDURE — 99214 OFFICE O/P EST MOD 30 MIN: CPT | Performed by: PHYSICIAN ASSISTANT

## 2025-01-30 RX ORDER — HUMAN INSULIN 100 [USP'U]/ML
INJECTION, SUSPENSION SUBCUTANEOUS
Qty: 10 ML | Refills: 3 | Status: SHIPPED | OUTPATIENT
Start: 2025-01-30

## 2025-01-30 RX ORDER — METFORMIN HYDROCHLORIDE 500 MG/1
1000 TABLET, EXTENDED RELEASE ORAL 2 TIMES DAILY WITH MEALS
Qty: 270 TABLET | Refills: 1 | Status: SHIPPED | OUTPATIENT
Start: 2025-01-30

## 2025-01-30 RX ORDER — ATORVASTATIN CALCIUM 40 MG/1
40 TABLET, FILM COATED ORAL DAILY
Qty: 90 TABLET | Refills: 2 | Status: SHIPPED | OUTPATIENT
Start: 2025-01-30

## 2025-01-30 NOTE — PATIENT INSTRUCTIONS
Continue your medications with the following changes:  Increase PM Novolin to 18u  Increase Metformin to 1000mg twice daily  Monitor blood sugars regularly  Contact the office with any severe hypoglycemic or hyperglycemic events  Maintain appropriate diet and physical activity  Follow up in 3 months  Call with any questions, concerns, or refill requests  Obtain labs prior to your next appointment,    If you are not sharing your Dexcom data with the office please use your kevin on your phone to do so, so we can review your data ahead of your next visit, or remotely if you are having issues:    Download the Dexcom Clarity kevin IN ADDITION to your Dexcom kevin    Share code: Gordy

## 2025-01-30 NOTE — PROGRESS NOTES
Established Patient Progress Note      Chief Complaint   Patient presents with    Diabetes Type 2        Impression & Plan:     Assessment & Plan  Type 2 diabetes mellitus with other specified complication, with long-term current use of insulin (HCC)    Lab Results   Component Value Date    HGBA1C 11.7 (A) 10/24/2024   Current HbA1c represents poor control. Blood sugars demonstrating elevations in AM and PM likely due to new diagnosis, ongoing modifications and insulin adjustments.   Continue current regimen with the following adjustments:  Increase Novolin 70/30 to 18u  Increase Metformin XR to 1000mg BID  Advised to adhere to diabetic diet, and recommended staying active/exercising routinely.  Discussed symptoms and treatment of hypoglycemia.    Recommended routine follow-up with Ophthalmology and foot exams.   Ordered blood work to complete prior to next visit.  Orders:    POCT hemoglobin A1c    Comprehensive metabolic panel; Future    Hemoglobin A1C; Future    Essential hypertension  BP at goal.   Continue current medication regimen.          Vitamin D deficiency  Takes 1000IU daily       Hyperlipidemia, unspecified hyperlipidemia type  Poorly controlled  Continue statin - Increase to 40mg Lipitor  Check routine lipid panel.     Orders:    atorvastatin (LIPITOR) 40 mg tablet; Take 1 tablet (40 mg total) by mouth daily    Newly diagnosed diabetes (HCC)    Lab Results   Component Value Date    HGBA1C 11.7 (A) 10/24/2024       Orders:    insulin NPH-insulin regular (NovoLIN 70/30) 100 units/mL subcutaneous injection; 25u in AM and 18u in PM    Diabetes mellitus, new onset (HCC)    Lab Results   Component Value Date    HGBA1C 11.7 (A) 10/24/2024       Orders:    metFORMIN (GLUCOPHAGE-XR) 500 mg 24 hr tablet; Take 2 tablets (1,000 mg total) by mouth 2 (two) times a day with meals    Hyperparathyroidism (HCC)  150 on previous labs   Updated labs ordered  Likely related to Vitamin D deficiency  Vitamin D pending                  History of Present Illness:   Sander Branch is a 44 y.o. male with type 2 diabetes seen in follow up. Denies complications. Denies recent severe hypoglycemic or severe hyperglycemic episodes. Denies any issues with his current regimen. Last A1C was 10.1 performed in office today.    JOVANY 65, IA-2 and insulin autoantibody negative, C-peptide normal. Patient has CGM, and is working on getting this set up.     Repots GI issues has resolved since switching to metformin XR.  At his last visit he was prescribed Tresiba 16 units daily but this was too expensive for patient.  His mom is also recently diagnosed diabetic and was placed on Novolin 70/30 before switching to her long-acting insulin.  He did request being switched to this and has been taking 25 units in the morning 15 units in the evening.  He does not have a blood sugar log but reports his morning sugars have been in the high 100s, low 200s and his evening blood sugars have been in the 180s.  He is agreeable to a CGM but has yet to set this up.  He denies any recent hospitalizations, illnesses or steroid use.    Home blood glucometer readings:   Before breakfast: 180s - 220  Before dinner: 180s     Current regimen:   Novolin 70/30 25u qAM and 15u qPM  Metformin XR 500mg TID    Last Eye Exam: 6/3/23, has appointment in next month  Last Foot Exam: 12/9/24, due: 12/9/2025    Patient Active Problem List   Diagnosis    Abnormal LFTs    Essential hypertension    Vitamin D deficiency    Fatty liver    Degenerative lumbar disc    Chronic rhinitis    COVID-19    Seasonal allergies    Annual physical exam    Newly diagnosed diabetes (HCC)    Hammer toes of both feet    Inguinal lymphadenopathy    Hypercalcemia    Type 2 diabetes mellitus with other specified complication (HCC)    Hyperlipidemia    Hyperparathyroidism (HCC)      Past Medical History:   Diagnosis Date    Acute bronchiolitis with bronchospasm     LAST ASSESSED: 11/25/16    Allergic     Asthma      COVID-19 10/14/2021    Hypertension     Obesity     Otitis media     Pneumonia       Past Surgical History:   Procedure Laterality Date    TONSILLECTOMY  2013    [facility]      Social History     Tobacco Use    Smoking status: Former     Current packs/day: 0.00     Average packs/day: 0.5 packs/day for 20.0 years (10.0 ttl pk-yrs)     Types: Cigarettes     Start date:      Quit date: 2020     Years since quittin.0    Smokeless tobacco: Never    Tobacco comments:         Substance Use Topics    Alcohol use: Yes     Comment: soc     Allergies   Allergen Reactions    Cefaclor Hives         Current Outpatient Medications:     albuterol (PROVENTIL HFA,VENTOLIN HFA) 90 mcg/act inhaler, Inhale 2 puffs every 4 (four) hours as needed for wheezing, Disp: 6.7 Inhaler, Rfl: 0    amLODIPine (NORVASC) 5 mg tablet, TAKE 1 TABLET (5 MG TOTAL) BY MOUTH DAILY., Disp: 90 tablet, Rfl: 0    atorvastatin (LIPITOR) 40 mg tablet, Take 1 tablet (40 mg total) by mouth daily, Disp: 90 tablet, Rfl: 2    Blood Glucose Monitoring Suppl (OneTouch Verio) w/Device KIT, Use 1 Device 3 (three) times a day, Disp: 1 kit, Rfl: 0    cholecalciferol (VITAMIN D3) 1,000 units tablet, Take 1,000 Units by mouth daily, Disp: , Rfl:     Continuous Glucose  (Dexcom G7 ) JULIANN, Use 1 each in the morning, Disp: 1 each, Rfl: 0    Continuous Glucose Sensor (Dexcom G7 Sensor), Use 1 Device every 10 days, Disp: 3 each, Rfl: 5    cyclobenzaprine (FLEXERIL) 10 mg tablet, Take 1 tablet (10 mg total) by mouth daily at bedtime, Disp: 15 tablet, Rfl: 1    fexofenadine (ALLEGRA) 30 MG tablet, Take 30 mg by mouth daily, Disp: , Rfl:     fluticasone (FLONASE) 50 mcg/act nasal spray, 1 spray into each nostril daily, Disp: , Rfl:     glucose blood test strip, Use 1 each 3 (three) times a day Verio - Use to test 3x daily, Disp: 300 each, Rfl: 1    insulin NPH-insulin regular (NovoLIN 70/30) 100 units/mL subcutaneous injection, 25u in AM and  "18u in PM, Disp: 10 mL, Rfl: 3    Insulin Pen Needle (BD Pen Needle Brenda U/F) 32G X 4 MM MISC, Use daily with insulin pens, Disp: 90 each, Rfl: 2    lisinopril (ZESTRIL) 10 mg tablet, TAKE 1 TABLET BY MOUTH EVERY DAY, Disp: 90 tablet, Rfl: 1    metFORMIN (GLUCOPHAGE-XR) 500 mg 24 hr tablet, Take 2 tablets (1,000 mg total) by mouth 2 (two) times a day with meals, Disp: 270 tablet, Rfl: 1    montelukast (SINGULAIR) 10 mg tablet, TAKE 1 TABLET BY MOUTH DAILY AT BEDTIME, Disp: 90 tablet, Rfl: 1    Multiple Vitamin (MULTI-DAY VITAMINS) TABS, Take by mouth, Disp: , Rfl:     omeprazole (PriLOSEC) 40 MG capsule, Take 1 capsule (40 mg total) by mouth daily 30 minutes before breakfast, Disp: 90 capsule, Rfl: 2    insulin glargine (Toujeo SoloStar) 300 units/mL CONCENTRATED U-300 injection pen (1-unit dial), Inject 16units subcut once daily (Patient not taking: Reported on 1/30/2025), Disp: 4.5 mL, Rfl: 2    moxifloxacin (VIGAMOX) 0.5 % ophthalmic solution, Administer 1 drop to both eyes 3 (three) times a day (Patient not taking: Reported on 9/27/2023), Disp: 3 mL, Rfl: 0    Review of Systems   Constitutional:  Negative for fatigue.   HENT:  Negative for trouble swallowing.    Respiratory:  Negative for shortness of breath.    Cardiovascular:  Negative for chest pain and palpitations.   Gastrointestinal:  Negative for abdominal pain, diarrhea, nausea and vomiting.   Endocrine: Negative for polydipsia and polyuria.   Neurological:  Negative for light-headedness and headaches.   All other systems reviewed and are negative.      Physical Exam:  Body mass index is 30.58 kg/m².  /84 (BP Location: Left arm, Patient Position: Sitting, Cuff Size: Adult)   Ht 6' 4\" (1.93 m)   Wt 114 kg (251 lb 3.2 oz)   BMI 30.58 kg/m²    Wt Readings from Last 3 Encounters:   01/30/25 114 kg (251 lb 3.2 oz)   12/09/24 105 kg (231 lb)   11/04/24 109 kg (240 lb)       Physical Exam  Constitutional:       General: He is not in acute distress.     " Appearance: He is well-developed.   HENT:      Head: Normocephalic and atraumatic.   Eyes:      General: No scleral icterus.     Conjunctiva/sclera: Conjunctivae normal.   Pulmonary:      Effort: Pulmonary effort is normal. No respiratory distress.   Skin:     Findings: No rash.   Neurological:      Mental Status: He is alert.   Psychiatric:         Mood and Affect: Mood and affect normal.           Labs:   Lab Results   Component Value Date    HGBA1C 11.7 (A) 10/24/2024    HGBA1C 6.6 (H) 11/18/2023    HGBA1C 6.5 11/14/2023     Lab Results   Component Value Date    CREATININE 0.71 10/19/2024    CREATININE 0.93 06/18/2023    CREATININE 1.10 04/27/2023    BUN 13 10/19/2024    K 4.3 10/19/2024     10/19/2024    CO2 25 10/19/2024     eGFR   Date Value Ref Range Status   10/19/2024 115 ml/min/1.73sq m Final     Lab Results   Component Value Date    HDL 24 (L) 10/27/2024    TRIG 584 (H) 10/27/2024     Lab Results   Component Value Date    ALT 40 10/19/2024    AST 20 10/19/2024    ALKPHOS 73 10/19/2024     Lab Results   Component Value Date    WAX9CTTSODNN 1.440 10/19/2024       Patient Instructions   Continue your medications with the following changes:  Increase PM Novolin to 18u  Increase Metformin to 1000mg twice daily  Monitor blood sugars regularly  Contact the office with any severe hypoglycemic or hyperglycemic events  Maintain appropriate diet and physical activity  Follow up in 3 months  Call with any questions, concerns, or refill requests  Obtain labs prior to your next appointment,    If you are not sharing your Dexcom data with the office please use your kevin on your phone to do so, so we can review your data ahead of your next visit, or remotely if you are having issues:    Download the Dexcom Clarity kevin IN ADDITION to your Dexcom kevin    Share code: [email or web address]      Discussed with the patient and all questioned fully answered. He will call me if any problems arise.    Ree Edwards PA-C

## 2025-01-30 NOTE — ASSESSMENT & PLAN NOTE
Lab Results   Component Value Date    HGBA1C 11.7 (A) 10/24/2024   Current HbA1c represents poor control. Blood sugars demonstrating elevations in AM and PM likely due to new diagnosis, ongoing modifications and insulin adjustments.   Continue current regimen with the following adjustments:  Increase Novolin 70/30 to 18u  Increase Metformin XR to 1000mg BID  Advised to adhere to diabetic diet, and recommended staying active/exercising routinely.  Discussed symptoms and treatment of hypoglycemia.    Recommended routine follow-up with Ophthalmology and foot exams.   Ordered blood work to complete prior to next visit.  Orders:    POCT hemoglobin A1c    Comprehensive metabolic panel; Future    Hemoglobin A1C; Future

## 2025-01-30 NOTE — ASSESSMENT & PLAN NOTE
150 on previous labs   Updated labs ordered  Likely related to Vitamin D deficiency  Vitamin D pending

## 2025-01-30 NOTE — ASSESSMENT & PLAN NOTE
Lab Results   Component Value Date    HGBA1C 11.7 (A) 10/24/2024       Orders:    insulin NPH-insulin regular (NovoLIN 70/30) 100 units/mL subcutaneous injection; 25u in AM and 18u in PM

## 2025-01-30 NOTE — ASSESSMENT & PLAN NOTE
Poorly controlled  Continue statin - Increase to 40mg Lipitor  Check routine lipid panel.     Orders:    atorvastatin (LIPITOR) 40 mg tablet; Take 1 tablet (40 mg total) by mouth daily

## 2025-02-13 ENCOUNTER — OFFICE VISIT (OUTPATIENT)
Dept: GASTROENTEROLOGY | Facility: CLINIC | Age: 45
End: 2025-02-13
Payer: COMMERCIAL

## 2025-02-13 VITALS
WEIGHT: 250 LBS | TEMPERATURE: 98.6 F | HEIGHT: 76 IN | BODY MASS INDEX: 30.44 KG/M2 | DIASTOLIC BLOOD PRESSURE: 80 MMHG | SYSTOLIC BLOOD PRESSURE: 120 MMHG

## 2025-02-13 DIAGNOSIS — K22.70 BARRETT'S ESOPHAGUS WITHOUT DYSPLASIA: Primary | ICD-10-CM

## 2025-02-13 DIAGNOSIS — Z86.0100 PERSONAL HISTORY OF COLON POLYPS, UNSPECIFIED: ICD-10-CM

## 2025-02-13 PROCEDURE — 99214 OFFICE O/P EST MOD 30 MIN: CPT | Performed by: PHYSICIAN ASSISTANT

## 2025-02-13 NOTE — PROGRESS NOTES
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  Thankfully he says he has been doing very well on omeprazole 40 mg daily and with changing his diet completely.  He says he is very pleased at this time.  -Continue omeprazole 40 mg daily  -Continue anti-GERD diet  -Repeat EGD in about 5 years for screening       Personal history of colon polyps, unspecified  Colonoscopy noted scattered diverticulosis in the sigmoid and 4 small polyps, all of which were hyperplastic on pathology.  -Repeat colonoscopy in 5 years           History of Present Illness   HPI  Sander Branch is a 44 y.o. male who presents for follow-up. Thankfully he says he has been doing very well on omeprazole 40 mg daily and with changing his diet completely.  He says he has not been needing to use the MiraLAX as he is having regular bowel movements daily.  He says the only time he has issues with his bowel movements is when he eats fast food, which is where, as this gives him diarrhea.  Otherwise he denies abdominal pain, nausea vomiting, trouble swallowing or eating, bloody or black bowel movements.  Patient says he is doing very well at this time.  History obtained from: patient    Review of Systems   Constitutional:  Negative for appetite change, chills, diaphoresis, fatigue, fever and unexpected weight change.   HENT:  Negative for trouble swallowing.    Gastrointestinal:  Negative for abdominal distention, abdominal pain, anal bleeding, blood in stool, constipation, diarrhea, nausea, rectal pain and vomiting.   Neurological:  Negative for dizziness and light-headedness.     Medical History Reviewed by provider this encounter:     .  Past Medical History    Past Medical History:   Diagnosis Date    Acute bronchiolitis with bronchospasm     LAST ASSESSED: 11/25/16    Allergic     Asthma     COVID-19 10/14/2021    Hypertension     Obesity     Otitis media     Pneumonia      Past Surgical History:   Procedure Laterality Date    TONSILLECTOMY  2013    [facility]     Family History   Problem Relation Age of Onset    Hypertension Mother     Thyroid nodules Mother     Ulcerative colitis Mother     Diabetes unspecified Mother     Thyroid disease unspecified Mother     Coronary artery disease Mother     Heart disease Mother     Asthma Mother     COPD Mother     Arthritis Mother     Hypertension Father     Heart disease Maternal Grandfather         CARDIAC DISORDER    Prostate cancer Maternal Grandfather     Heart disease Paternal Grandmother         CARDIAC DISORDER    Hypertension Paternal Grandmother     Kidney failure Paternal Grandmother         RENAL    Heart attack Paternal Grandfather 38    Heart failure Paternal Grandfather     Coronary artery disease Paternal Aunt     Heart attack Paternal Aunt     Heart attack Paternal Uncle     Coronary artery disease Paternal Uncle         CABG      reports that he quit smoking about 5 years ago. His smoking use included cigarettes. He started smoking about 25 years ago. He has a 10 pack-year smoking history. He has never used smokeless tobacco. He reports current alcohol use. He reports that he does not use drugs.  Current Outpatient Medications on File Prior to Visit   Medication Sig Dispense Refill    albuterol (PROVENTIL HFA,VENTOLIN HFA) 90 mcg/act inhaler Inhale 2 puffs every 4 (four) hours as needed for wheezing 6.7 Inhaler 0    amLODIPine (NORVASC) 5 mg tablet TAKE 1 TABLET (5 MG TOTAL) BY MOUTH DAILY. 90 tablet 0    atorvastatin (LIPITOR) 40 mg tablet Take 1 tablet (40 mg total) by mouth daily 90 tablet 2    Blood Glucose Monitoring Suppl (OneTouch Verio) w/Device KIT Use 1 Device 3 (three) times a day 1 kit 0     cholecalciferol (VITAMIN D3) 1,000 units tablet Take 1,000 Units by mouth daily      Continuous Glucose  (Dexcom G7 ) JULIANN Use 1 each in the morning 1 each 0    Continuous Glucose Sensor (Dexcom G7 Sensor) Use 1 Device every 10 days 3 each 5    cyclobenzaprine (FLEXERIL) 10 mg tablet Take 1 tablet (10 mg total) by mouth daily at bedtime 15 tablet 1    fexofenadine (ALLEGRA) 30 MG tablet Take 30 mg by mouth daily      fluticasone (FLONASE) 50 mcg/act nasal spray 1 spray into each nostril daily      glucose blood test strip Use 1 each 3 (three) times a day Verio - Use to test 3x daily 300 each 1    insulin glargine (Toujeo SoloStar) 300 units/mL CONCENTRATED U-300 injection pen (1-unit dial) Inject 16units subcut once daily 4.5 mL 2    insulin NPH-insulin regular (NovoLIN 70/30) 100 units/mL subcutaneous injection 25u in AM and 18u in PM 10 mL 3    Insulin Pen Needle (BD Pen Needle Brenda U/F) 32G X 4 MM MISC Use daily with insulin pens 90 each 2    lisinopril (ZESTRIL) 10 mg tablet TAKE 1 TABLET BY MOUTH EVERY DAY 90 tablet 1    metFORMIN (GLUCOPHAGE-XR) 500 mg 24 hr tablet Take 2 tablets (1,000 mg total) by mouth 2 (two) times a day with meals 270 tablet 1    montelukast (SINGULAIR) 10 mg tablet TAKE 1 TABLET BY MOUTH DAILY AT BEDTIME 90 tablet 1    moxifloxacin (VIGAMOX) 0.5 % ophthalmic solution Administer 1 drop to both eyes 3 (three) times a day 3 mL 0    Multiple Vitamin (MULTI-DAY VITAMINS) TABS Take by mouth      omeprazole (PriLOSEC) 40 MG capsule Take 1 capsule (40 mg total) by mouth daily 30 minutes before breakfast 90 capsule 2     No current facility-administered medications on file prior to visit.     Allergies   Allergen Reactions    Cefaclor Hives      Current Outpatient Medications on File Prior to Visit   Medication Sig Dispense Refill    albuterol (PROVENTIL HFA,VENTOLIN HFA) 90 mcg/act inhaler Inhale 2 puffs every 4 (four) hours as needed for wheezing 6.7 Inhaler 0    amLODIPine  (NORVASC) 5 mg tablet TAKE 1 TABLET (5 MG TOTAL) BY MOUTH DAILY. 90 tablet 0    atorvastatin (LIPITOR) 40 mg tablet Take 1 tablet (40 mg total) by mouth daily 90 tablet 2    Blood Glucose Monitoring Suppl (OneTouch Verio) w/Device KIT Use 1 Device 3 (three) times a day 1 kit 0    cholecalciferol (VITAMIN D3) 1,000 units tablet Take 1,000 Units by mouth daily      Continuous Glucose  (Dexcom G7 ) JULIANN Use 1 each in the morning 1 each 0    Continuous Glucose Sensor (Dexcom G7 Sensor) Use 1 Device every 10 days 3 each 5    cyclobenzaprine (FLEXERIL) 10 mg tablet Take 1 tablet (10 mg total) by mouth daily at bedtime 15 tablet 1    fexofenadine (ALLEGRA) 30 MG tablet Take 30 mg by mouth daily      fluticasone (FLONASE) 50 mcg/act nasal spray 1 spray into each nostril daily      glucose blood test strip Use 1 each 3 (three) times a day Verio - Use to test 3x daily 300 each 1    insulin glargine (Toujeo SoloStar) 300 units/mL CONCENTRATED U-300 injection pen (1-unit dial) Inject 16units subcut once daily 4.5 mL 2    insulin NPH-insulin regular (NovoLIN 70/30) 100 units/mL subcutaneous injection 25u in AM and 18u in PM 10 mL 3    Insulin Pen Needle (BD Pen Needle Brenda U/F) 32G X 4 MM MISC Use daily with insulin pens 90 each 2    lisinopril (ZESTRIL) 10 mg tablet TAKE 1 TABLET BY MOUTH EVERY DAY 90 tablet 1    metFORMIN (GLUCOPHAGE-XR) 500 mg 24 hr tablet Take 2 tablets (1,000 mg total) by mouth 2 (two) times a day with meals 270 tablet 1    montelukast (SINGULAIR) 10 mg tablet TAKE 1 TABLET BY MOUTH DAILY AT BEDTIME 90 tablet 1    moxifloxacin (VIGAMOX) 0.5 % ophthalmic solution Administer 1 drop to both eyes 3 (three) times a day 3 mL 0    Multiple Vitamin (MULTI-DAY VITAMINS) TABS Take by mouth      omeprazole (PriLOSEC) 40 MG capsule Take 1 capsule (40 mg total) by mouth daily 30 minutes before breakfast 90 capsule 2     No current facility-administered medications on file prior to visit.      Social  "History     Tobacco Use    Smoking status: Former     Current packs/day: 0.00     Average packs/day: 0.5 packs/day for 20.0 years (10.0 ttl pk-yrs)     Types: Cigarettes     Start date:      Quit date: 2020     Years since quittin.1    Smokeless tobacco: Never    Tobacco comments:         Vaping Use    Vaping status: Former    Substances: Nicotine, Flavoring   Substance and Sexual Activity    Alcohol use: Yes     Comment: soc    Drug use: No    Sexual activity: Yes     Partners: Male     Birth control/protection: Condom Male        Objective   /80 (BP Location: Right arm, Patient Position: Sitting, Cuff Size: Adult)   Temp 98.6 °F (37 °C) (Tympanic)   Ht 6' 4\" (1.93 m)   Wt 113 kg (250 lb)   BMI 30.43 kg/m²      Physical Exam  Constitutional:       Appearance: Normal appearance.   Cardiovascular:      Rate and Rhythm: Normal rate and regular rhythm.      Heart sounds: Normal heart sounds.   Pulmonary:      Breath sounds: Normal breath sounds.   Abdominal:      General: Abdomen is flat. Bowel sounds are normal. There is no distension.      Palpations: Abdomen is soft. There is no mass.      Tenderness: There is no abdominal tenderness. There is no right CVA tenderness, left CVA tenderness, guarding or rebound.      Hernia: No hernia is present.   Neurological:      Mental Status: He is alert.         Administrative Statements   I have spent a total time of 30 minutes in caring for this patient on the day of the visit/encounter including   "

## 2025-03-08 ENCOUNTER — APPOINTMENT (OUTPATIENT)
Dept: LAB | Age: 45
End: 2025-03-08
Payer: COMMERCIAL

## 2025-03-08 DIAGNOSIS — Z79.4 TYPE 2 DIABETES MELLITUS WITH OTHER SPECIFIED COMPLICATION, WITH LONG-TERM CURRENT USE OF INSULIN (HCC): ICD-10-CM

## 2025-03-08 DIAGNOSIS — E21.3 HYPERPARATHYROIDISM (HCC): ICD-10-CM

## 2025-03-08 DIAGNOSIS — E11.9 DIABETES MELLITUS, NEW ONSET (HCC): ICD-10-CM

## 2025-03-08 DIAGNOSIS — E11.69 TYPE 2 DIABETES MELLITUS WITH OTHER SPECIFIED COMPLICATION, WITH LONG-TERM CURRENT USE OF INSULIN (HCC): ICD-10-CM

## 2025-03-08 DIAGNOSIS — E55.9 VITAMIN D DEFICIENCY: ICD-10-CM

## 2025-03-08 LAB
25(OH)D3 SERPL-MCNC: 15.3 NG/ML (ref 30–100)
ALBUMIN SERPL BCG-MCNC: 4.5 G/DL (ref 3.5–5)
ALP SERPL-CCNC: 66 U/L (ref 34–104)
ALT SERPL W P-5'-P-CCNC: 98 U/L (ref 7–52)
ANION GAP SERPL CALCULATED.3IONS-SCNC: 6 MMOL/L (ref 4–13)
AST SERPL W P-5'-P-CCNC: 47 U/L (ref 13–39)
BILIRUB SERPL-MCNC: 0.51 MG/DL (ref 0.2–1)
BUN SERPL-MCNC: 14 MG/DL (ref 5–25)
CALCIUM SERPL-MCNC: 11.2 MG/DL (ref 8.4–10.2)
CHLORIDE SERPL-SCNC: 100 MMOL/L (ref 96–108)
CO2 SERPL-SCNC: 30 MMOL/L (ref 21–32)
CREAT SERPL-MCNC: 0.77 MG/DL (ref 0.6–1.3)
CREAT UR-MCNC: 107.9 MG/DL
EST. AVERAGE GLUCOSE BLD GHB EST-MCNC: 232 MG/DL
GFR SERPL CREATININE-BSD FRML MDRD: 111 ML/MIN/1.73SQ M
GLUCOSE P FAST SERPL-MCNC: 245 MG/DL (ref 65–99)
HBA1C MFR BLD: 9.7 %
MICROALBUMIN UR-MCNC: <7 MG/L
POTASSIUM SERPL-SCNC: 4.4 MMOL/L (ref 3.5–5.3)
PROT SERPL-MCNC: 7.1 G/DL (ref 6.4–8.4)
PTH-INTACT SERPL-MCNC: 138.7 PG/ML (ref 12–88)
SODIUM SERPL-SCNC: 136 MMOL/L (ref 135–147)

## 2025-03-08 PROCEDURE — 82043 UR ALBUMIN QUANTITATIVE: CPT

## 2025-03-08 PROCEDURE — 83036 HEMOGLOBIN GLYCOSYLATED A1C: CPT

## 2025-03-08 PROCEDURE — 83970 ASSAY OF PARATHORMONE: CPT

## 2025-03-08 PROCEDURE — 82570 ASSAY OF URINE CREATININE: CPT

## 2025-03-08 PROCEDURE — 36415 COLL VENOUS BLD VENIPUNCTURE: CPT

## 2025-03-08 PROCEDURE — 82306 VITAMIN D 25 HYDROXY: CPT

## 2025-03-08 PROCEDURE — 80053 COMPREHEN METABOLIC PANEL: CPT

## 2025-03-10 ENCOUNTER — RESULTS FOLLOW-UP (OUTPATIENT)
Dept: ENDOCRINOLOGY | Facility: CLINIC | Age: 45
End: 2025-03-10

## 2025-03-27 DIAGNOSIS — I10 ESSENTIAL HYPERTENSION: ICD-10-CM

## 2025-03-27 RX ORDER — AMLODIPINE BESYLATE 5 MG/1
5 TABLET ORAL DAILY
Qty: 90 TABLET | Refills: 1 | Status: SHIPPED | OUTPATIENT
Start: 2025-03-27

## 2025-03-27 NOTE — TELEPHONE ENCOUNTER
Reason for call:   [x] Refill   [] Prior Auth  [] Other:     Office:   [x] PCP/Provider -   [] Specialty/Provider -     Medication:   amLODIPine (NORVASC) 5 mg t       Dose/Frequency:  TAKE 1 TABLET (5 MG TOTAL) BY MOUTH DAILY.     Quantity: 90    Pharmacy: [facility]pharm[facility] #[id number] - [address] [facility]   Does the patient have enough for 3 days?   [x] Yes   [] No - Send as HP to POD    Mail Away Pharmacy   Does the patient have enough for 10 days?   [] Yes   [] No - Send as HP to POD

## 2025-04-08 DIAGNOSIS — Z00.6 ENCOUNTER FOR EXAMINATION FOR NORMAL COMPARISON OR CONTROL IN CLINICAL RESEARCH PROGRAM: ICD-10-CM

## 2025-04-17 ENCOUNTER — OFFICE VISIT (OUTPATIENT)
Dept: FAMILY MEDICINE CLINIC | Facility: CLINIC | Age: 45
End: 2025-04-17
Payer: COMMERCIAL

## 2025-04-17 VITALS
TEMPERATURE: 97.5 F | BODY MASS INDEX: 31.78 KG/M2 | OXYGEN SATURATION: 98 % | WEIGHT: 261 LBS | RESPIRATION RATE: 16 BRPM | HEART RATE: 99 BPM | HEIGHT: 76 IN | SYSTOLIC BLOOD PRESSURE: 108 MMHG | DIASTOLIC BLOOD PRESSURE: 80 MMHG

## 2025-04-17 DIAGNOSIS — J01.00 SUBACUTE MAXILLARY SINUSITIS: Primary | ICD-10-CM

## 2025-04-17 PROCEDURE — 99213 OFFICE O/P EST LOW 20 MIN: CPT | Performed by: INTERNAL MEDICINE

## 2025-04-17 RX ORDER — SULFAMETHOXAZOLE AND TRIMETHOPRIM 800; 160 MG/1; MG/1
1 TABLET ORAL 2 TIMES DAILY
Qty: 14 TABLET | Refills: 0 | Status: SHIPPED | OUTPATIENT
Start: 2025-04-17 | End: 2025-04-24

## 2025-04-17 NOTE — PROGRESS NOTES
"Name: Sander Branch      : 1980      MRN: [id number]  Encounter Provider: Brooke Kwon MD  Encounter Date: 2025   Encounter department: [facility]  :  Assessment & Plan  Subacute maxillary sinusitis    Orders:    sulfamethoxazole-trimethoprim (BACTRIM DS) 800-160 mg per tablet; Take 1 tablet by mouth 2 (two) times a day for 7 days           History of Present Illness   Sinus Problem  This is a new problem. The current episode started in the past 7 days. The problem has been gradually worsening since onset. There has been no fever. His pain is at a severity of 3/10. The pain is mild. Associated symptoms include congestion, coughing, headaches and sinus pressure. Pertinent negatives include no chills, ear pain, shortness of breath or sore throat. Past treatments include oral decongestants, saline nose sprays and acetaminophen. The treatment provided no relief.     Review of Systems   Constitutional:  Negative for chills and fever.   HENT:  Positive for congestion and sinus pressure. Negative for ear pain and sore throat.    Eyes:  Negative for pain and visual disturbance.   Respiratory:  Positive for cough. Negative for shortness of breath.    Cardiovascular:  Negative for chest pain and palpitations.   Gastrointestinal:  Negative for abdominal pain and vomiting.   Genitourinary:  Negative for dysuria and hematuria.   Musculoskeletal:  Negative for arthralgias and back pain.   Skin:  Negative for color change and rash.   Neurological:  Positive for headaches. Negative for seizures and syncope.   All other systems reviewed and are negative.      Objective   /80 (BP Location: Left arm, Patient Position: Sitting, Cuff Size: Large)   Pulse 99   Temp 97.5 °F (36.4 °C) (Temporal)   Resp 16   Ht 6' 4\" (1.93 m)   Wt 118 kg (261 lb)   SpO2 98%   BMI 31.77 kg/m²      Physical Exam  Constitutional:       Appearance: Normal appearance. He is normal weight.   HENT:      Head: " Normocephalic.      Right Ear: Tympanic membrane normal.      Left Ear: Tympanic membrane normal.      Nose: Congestion present.      Mouth/Throat:      Mouth: Mucous membranes are moist.      Pharynx: No posterior oropharyngeal erythema.   Eyes:      Extraocular Movements: Extraocular movements intact.      Pupils: Pupils are equal, round, and reactive to light.   Cardiovascular:      Rate and Rhythm: Normal rate and regular rhythm.      Pulses: Normal pulses.      Heart sounds: Normal heart sounds.   Pulmonary:      Effort: Pulmonary effort is normal.      Breath sounds: Normal breath sounds.   Abdominal:      General: Abdomen is flat.      Palpations: Abdomen is soft.   Musculoskeletal:      Right lower leg: No edema.      Left lower leg: No edema.   Lymphadenopathy:      Cervical: No cervical adenopathy.   Skin:     General: Skin is warm and dry.   Neurological:      General: No focal deficit present.      Mental Status: He is alert and oriented to person, place, and time.   Psychiatric:         Mood and Affect: Mood normal.         Behavior: Behavior normal.

## 2025-04-29 ENCOUNTER — OFFICE VISIT (OUTPATIENT)
Dept: ENDOCRINOLOGY | Facility: CLINIC | Age: 45
End: 2025-04-29
Payer: COMMERCIAL

## 2025-04-29 VITALS
BODY MASS INDEX: 31.56 KG/M2 | HEIGHT: 76 IN | HEART RATE: 79 BPM | SYSTOLIC BLOOD PRESSURE: 116 MMHG | DIASTOLIC BLOOD PRESSURE: 78 MMHG | WEIGHT: 259.2 LBS

## 2025-04-29 DIAGNOSIS — E11.69 TYPE 2 DIABETES MELLITUS WITH OTHER SPECIFIED COMPLICATION, WITH LONG-TERM CURRENT USE OF INSULIN (HCC): Primary | ICD-10-CM

## 2025-04-29 DIAGNOSIS — Z79.4 TYPE 2 DIABETES MELLITUS WITH OTHER SPECIFIED COMPLICATION, WITH LONG-TERM CURRENT USE OF INSULIN (HCC): Primary | ICD-10-CM

## 2025-04-29 DIAGNOSIS — I10 ESSENTIAL HYPERTENSION: ICD-10-CM

## 2025-04-29 DIAGNOSIS — E78.5 HYPERLIPIDEMIA, UNSPECIFIED HYPERLIPIDEMIA TYPE: ICD-10-CM

## 2025-04-29 DIAGNOSIS — E21.3 HYPERPARATHYROIDISM (HCC): ICD-10-CM

## 2025-04-29 DIAGNOSIS — E55.9 VITAMIN D DEFICIENCY: ICD-10-CM

## 2025-04-29 DIAGNOSIS — Z79.4 CURRENT USE OF INSULIN (HCC): ICD-10-CM

## 2025-04-29 DIAGNOSIS — E11.69 TYPE 2 DIABETES MELLITUS WITH OTHER SPECIFIED COMPLICATION, UNSPECIFIED WHETHER LONG TERM INSULIN USE (HCC): ICD-10-CM

## 2025-04-29 PROCEDURE — 99214 OFFICE O/P EST MOD 30 MIN: CPT | Performed by: PHYSICIAN ASSISTANT

## 2025-04-29 RX ORDER — INSULIN LISPRO 100 [IU]/ML
5 INJECTION, SOLUTION INTRAVENOUS; SUBCUTANEOUS
Qty: 15 ML | Refills: 2 | Status: SHIPPED | OUTPATIENT
Start: 2025-04-29

## 2025-04-29 RX ORDER — INSULIN GLARGINE 300 U/ML
INJECTION, SOLUTION SUBCUTANEOUS
Qty: 4.5 ML | Refills: 2 | Status: SHIPPED | OUTPATIENT
Start: 2025-04-29

## 2025-04-29 RX ORDER — ERGOCALCIFEROL 1.25 MG/1
50000 CAPSULE, LIQUID FILLED ORAL WEEKLY
Qty: 12 CAPSULE | Refills: 0 | Status: SHIPPED | OUTPATIENT
Start: 2025-04-29 | End: 2025-07-16

## 2025-04-29 RX ORDER — ERGOCALCIFEROL 1.25 MG/1
50000 CAPSULE, LIQUID FILLED ORAL 3 TIMES WEEKLY
Qty: 12 CAPSULE | Refills: 0 | Status: SHIPPED | OUTPATIENT
Start: 2025-04-30 | End: 2025-04-29

## 2025-04-29 NOTE — PROGRESS NOTES
Name: Sander MCKOY Redline      : 1980      MRN: [id number]  Encounter Provider: Ree Edwards PA-C  Encounter Date: 2025   Encounter department: [facility]    Chief Complaint   Patient presents with   • Diabetes Type 2   • Hyperparathyroidism   :  Assessment & Plan  Type 2 diabetes mellitus with other specified complication, with long-term current use of insulin ([facility])    Lab Results   Component Value Date    HGBA1C 9.7 (H) 2025   Current HbA1c represents poor control. Blood sugars demonstrating variability likely due to dietary indiscretions.   Continue current regimen with the following adjustments:  Transition to basal-bolus regimen  Toujeo 20u nightly   Humalog 5u TID prior to meals  Advised to adhere to diabetic diet, and recommended staying active/exercising routinely.  Discussed symptoms and treatment of hypoglycemia.    Recommended routine follow-up with Ophthalmology and foot exams.   Ordered blood work to complete prior to next visit.  Orders:  •  insulin lispro (HumaLOG KwikPen) 100 units/mL injection pen; Inject 5 Units under the skin 3 (three) times a day with meals  •  Comprehensive metabolic panel; Future  •  Hemoglobin A1C; Future    Essential hypertension  BP at goal.   Continue current medication regimen.        Hyperparathyroidism ([facility])  PTH: 138.7  Calcium (corrected): 10.8  Creatinine: 0.77, EGFR: 111  Encourage compliance with vitamin D supplementation  Sestamibi scan ordered  Orders:  •  Phosphorus; Future  •  NM parathyroid scan w spect; Future    Vitamin D deficiency  Vitamin D 25-hydroxy: 15.3  Take D2 50,000IU weekly x12 doses. Then plan to transition back to daily D3  Orders:  •  Vitamin D 25 hydroxy; Future  •  ergocalciferol (VITAMIN D2) 50,000 units; Take 1 capsule (50,000 Units total) by mouth once a week for 12 doses    Hyperlipidemia, unspecified hyperlipidemia type         Type 2 diabetes mellitus with other  specified complication, unspecified whether long term insulin use (HCC)    Lab Results   Component Value Date    HGBA1C 9.7 (H) 03/08/2025     Orders:  •  insulin glargine (Toujeo SoloStar) 300 units/mL CONCENTRATED U-300 injection pen (1-unit dial); Inject 20units subcut once daily    Current use of insulin (HCC)    Orders:  •  insulin glargine (Toujeo SoloStar) 300 units/mL CONCENTRATED U-300 injection pen (1-unit dial); Inject 20units subcut once daily          History of Present Illness {?Quick Links Encounters * My Last Note * Last Note in Specialty * Snapshot * Since Last Visit * History :52667}  History of Present Illness  Sander Branch is a 45 y.o. male with a past medical history of hyperparathyroidism, vitamin D deficiency, type 2 diabetes seen in follow up.    The patient reports persistent hyperglycemia despite dietary modifications. His current insulin regimen of 70/30 insulin includes 25 units in the morning and 18 units in the evening. He is interested in transitioning to Toujeo and needs a prescription for it. He tolerates Metformin, although he experiences flatulence which is managed by Gas-X. His postprandial glucose levels are approximately 258, but have been as high as 400s. He has a session with diabetic education scheduled for CGM training. He is open to using mealtime insulin and is incorporating reduced carbohydrate intake and daily walking into his routine.    The patient is taking over-the-counter vitamin D at a dose of 1000 IU daily. Despite getting 8 hours of sleep, he reports experiencing fatigue and sleepiness.        Current regimen:   Novolin 70/30: 25u qAM and 18u qPM  Metformin XR to 1000mg BID      Last Eye Exam: 06/03/2023  Last Foot Exam: 12/09/2024  Health Maintenance   Topic Date Due   • Diabetic Eye Exam  06/03/2025   • Diabetic Foot Exam  12/09/2025         Review of Systems as per HPI         Medical History Reviewed by provider this encounter:     .    Objective {?Quick  "Links Trend Vitals * Enter New Vitals * Results Review * Timeline (Adult) * Labs * Imaging * Cardiology * Procedures * Lung Cancer Screening * Surgical eConsent :24093}  /78 (BP Location: Left arm, Patient Position: Sitting, Cuff Size: Adult)   Pulse 79   Ht 6' 4\" (1.93 m)   Wt 118 kg (259 lb 3.2 oz)   BMI 31.55 kg/m²      Body mass index is 31.55 kg/m².  Wt Readings from Last 3 Encounters:   04/29/25 118 kg (259 lb 3.2 oz)   04/17/25 118 kg (261 lb)   02/13/25 113 kg (250 lb)     Physical Exam    Physical Exam  Vitals reviewed.   Constitutional:       General: He is not in acute distress.     Appearance: He is well-developed.   HENT:      Head: Normocephalic and atraumatic.   Eyes:      General: No scleral icterus.     Conjunctiva/sclera: Conjunctivae normal.   Pulmonary:      Effort: Pulmonary effort is normal.   Neurological:      Mental Status: He is alert.   Psychiatric:         Attention and Perception: Attention normal.       Results    Labs:   Lab Results   Component Value Date    HGBA1C 9.7 (H) 03/08/2025    HGBA1C 10.1 (A) 01/30/2025    HGBA1C 11.7 (A) 10/24/2024     Lab Results   Component Value Date    CREATININE 0.77 03/08/2025    CREATININE 0.71 10/19/2024    CREATININE 0.93 06/18/2023    BUN 14 03/08/2025    K 4.4 03/08/2025     03/08/2025    CO2 30 03/08/2025     eGFR   Date Value Ref Range Status   03/08/2025 111 ml/min/1.73sq m Final     Lab Results   Component Value Date    HDL 24 (L) 10/27/2024    TRIG 584 (H) 10/27/2024     Lab Results   Component Value Date    ALT 98 (H) 03/08/2025    AST 47 (H) 03/08/2025    ALKPHOS 66 03/08/2025     Lab Results   Component Value Date    [id number] 1.440 10/19/2024       There are no Patient Instructions on file for this visit.    Discussed with the patient and all questioned fully answered. He will call me if any problems arise.      "

## 2025-04-29 NOTE — ASSESSMENT & PLAN NOTE
Lab Results   Component Value Date    HGBA1C 9.7 (H) 03/08/2025   Current HbA1c represents poor control. Blood sugars demonstrating variability likely due to dietary indiscretions.   Continue current regimen with the following adjustments:  Transition to basal-bolus regimen  Toujeo 20u nightly   Humalog 5u TID prior to meals  Advised to adhere to diabetic diet, and recommended staying active/exercising routinely.  Discussed symptoms and treatment of hypoglycemia.    Recommended routine follow-up with Ophthalmology and foot exams.   Ordered blood work to complete prior to next visit.  Orders:  •  insulin lispro (HumaLOG KwikPen) 100 units/mL injection pen; Inject 5 Units under the skin 3 (three) times a day with meals  •  Comprehensive metabolic panel; Future  •  Hemoglobin A1C; Future

## 2025-04-29 NOTE — ASSESSMENT & PLAN NOTE
Lab Results   Component Value Date    HGBA1C 9.7 (H) 03/08/2025     Orders:  •  insulin glargine (Toujeo SoloStar) 300 units/mL CONCENTRATED U-300 injection pen (1-unit dial); Inject 20units subcut once daily

## 2025-04-29 NOTE — ASSESSMENT & PLAN NOTE
PTH: 138.7  Calcium (corrected): 10.8  Creatinine: 0.77, EGFR: 111  Encourage compliance with vitamin D supplementation  Sestamibi scan ordered  Orders:  •  Phosphorus; Future  •  NM parathyroid scan w spect; Future

## 2025-04-29 NOTE — ASSESSMENT & PLAN NOTE
Vitamin D 25-hydroxy: 15.3  Take D2 50,000IU weekly x12 doses. Then plan to transition back to daily D3  Orders:  •  Vitamin D 25 hydroxy; Future  •  ergocalciferol (VITAMIN D2) 50,000 units; Take 1 capsule (50,000 Units total) by mouth once a week for 12 doses

## 2025-05-17 PROBLEM — J01.00 ACUTE MAXILLARY SINUSITIS: Status: RESOLVED | Noted: 2022-04-26 | Resolved: 2025-05-17

## 2025-05-26 DIAGNOSIS — R13.10 DYSPHAGIA, UNSPECIFIED TYPE: ICD-10-CM

## 2025-05-27 RX ORDER — OMEPRAZOLE 40 MG/1
40 CAPSULE, DELAYED RELEASE ORAL
Qty: 90 CAPSULE | Refills: 1 | Status: SHIPPED | OUTPATIENT
Start: 2025-05-27

## 2025-06-08 DIAGNOSIS — E11.9 DIABETES MELLITUS, NEW ONSET (HCC): ICD-10-CM

## 2025-06-09 RX ORDER — METFORMIN HYDROCHLORIDE 500 MG/1
1000 TABLET, EXTENDED RELEASE ORAL 2 TIMES DAILY WITH MEALS
Qty: 270 TABLET | Refills: 0 | Status: SHIPPED | OUTPATIENT
Start: 2025-06-09

## 2025-06-24 ENCOUNTER — TELEPHONE (OUTPATIENT)
Age: 45
End: 2025-06-24

## 2025-06-24 NOTE — TELEPHONE ENCOUNTER
Advised pt he does not have a referral. He will call back when he does have one to schedule. When patient calls back, please schedule...    CGM Training- The following information should be in Note section    What type of CGM ? (Félix or Dexcom) confirm    What version? ( Félix 2(Plus),Félix 3(Plus), Dexcom G6, Dexcom G7) confirm    Will the patient be using the [place] or the Application on their phone? confirm    If using a phone, be sure they know which apps need to be downloaded prior to their appointment. ( Félix 2, Félix 3, Dexcom G6, Dexcom G7, Dexcom Clarity)   Please confirm patient's email  New patient  75 Minutes    Send to Education Clerical Pool/Dana to send paperwork

## 2025-06-24 NOTE — TELEPHONE ENCOUNTER
Patient's mother called in stating that she would like to schedule an appointment for CGM training together with her. Please call patient's mother to schedule.

## 2025-07-22 ENCOUNTER — OFFICE VISIT (OUTPATIENT)
Dept: FAMILY MEDICINE CLINIC | Facility: CLINIC | Age: 45
End: 2025-07-22
Payer: COMMERCIAL

## 2025-07-22 VITALS
HEART RATE: 91 BPM | WEIGHT: 255 LBS | SYSTOLIC BLOOD PRESSURE: 132 MMHG | HEIGHT: 76 IN | TEMPERATURE: 97.5 F | BODY MASS INDEX: 31.05 KG/M2 | DIASTOLIC BLOOD PRESSURE: 84 MMHG | OXYGEN SATURATION: 99 % | RESPIRATION RATE: 16 BRPM

## 2025-07-22 DIAGNOSIS — I10 ESSENTIAL HYPERTENSION: ICD-10-CM

## 2025-07-22 DIAGNOSIS — Z79.4 CURRENT USE OF INSULIN (HCC): ICD-10-CM

## 2025-07-22 DIAGNOSIS — E11.9 TYPE 2 DIABETES MELLITUS WITHOUT COMPLICATION, WITHOUT LONG-TERM CURRENT USE OF INSULIN (HCC): Primary | ICD-10-CM

## 2025-07-22 DIAGNOSIS — E11.69 TYPE 2 DIABETES MELLITUS WITH OTHER SPECIFIED COMPLICATION, UNSPECIFIED WHETHER LONG TERM INSULIN USE (HCC): ICD-10-CM

## 2025-07-22 DIAGNOSIS — F41.8 MIXED ANXIETY DEPRESSIVE DISORDER: ICD-10-CM

## 2025-07-22 LAB — SL AMB POCT HEMOGLOBIN AIC: 10.8 (ref ?–6.5)

## 2025-07-22 PROCEDURE — 83036 HEMOGLOBIN GLYCOSYLATED A1C: CPT | Performed by: INTERNAL MEDICINE

## 2025-07-22 PROCEDURE — 99213 OFFICE O/P EST LOW 20 MIN: CPT | Performed by: INTERNAL MEDICINE

## 2025-07-22 RX ORDER — MIRTAZAPINE 15 MG/1
15 TABLET, FILM COATED ORAL
Qty: 90 TABLET | Refills: 3 | Status: SHIPPED | OUTPATIENT
Start: 2025-07-22

## 2025-07-22 RX ORDER — INSULIN GLARGINE 300 U/ML
INJECTION, SOLUTION SUBCUTANEOUS
Qty: 4.5 ML | Refills: 2 | Status: SHIPPED | OUTPATIENT
Start: 2025-07-22

## 2025-07-22 RX ORDER — ALPRAZOLAM 0.25 MG
0.25 TABLET ORAL 2 TIMES DAILY PRN
Qty: 30 TABLET | Refills: 0 | Status: SHIPPED | OUTPATIENT
Start: 2025-07-22

## 2025-07-22 NOTE — PROGRESS NOTES
Name: Sander Branch      : 1980      MRN: [id number]  Encounter Provider: Brooke Kwon MD  Encounter Date: 2025   Encounter department: [facility]  :  Assessment & Plan  Type 2 diabetes mellitus without complication, without long-term current use of insulin (HCC)    Lab Results   Component Value Date    HGBA1C 10.8 (A) 2025       Orders:    POCT hemoglobin A1c    Type 2 diabetes mellitus with other specified complication, unspecified whether long term insulin use (HCC)    Lab Results   Component Value Date    HGBA1C 10.8 (A) 2025       Orders:    insulin glargine (Toujeo SoloStar) 300 units/mL CONCENTRATED U-300 injection pen (1-unit dial); Inject 30units subcut once daily    Current use of insulin (HCC)  A1c today was greater than 10 and he does not see endocrinology for another month so I took the opportunity to increase his Toujeo to 30 units daily he sees Endo  Orders:    insulin glargine (Toujeo SoloStar) 300 units/mL CONCENTRATED U-300 injection pen (1-unit dial); Inject 30units subcut once daily    Mixed anxiety depressive disorder  Patient has had some anxiety issues in the past but has been off of medications for quite some time but for some reason he is having marked increase in his anxiety and depression.    Orders:    mirtazapine (REMERON) 15 mg tablet; Take 1 tablet (15 mg total) by mouth daily at bedtime    ALPRAZolam (XANAX) 0.25 mg tablet; Take 1 tablet (0.25 mg total) by mouth 2 (two) times a day as needed for anxiety    Essential hypertension  His blood pressure however is well-controlled              History of Present Illness   Patient's biggest complaint is his anxiety and depression he says its gotten dramatically worse over the last several months from unknown reasons and he has no medications at home      Review of Systems   Constitutional:  Positive for fatigue. Negative for unexpected weight change.   HENT: Negative.     Eyes: Negative.   "  Respiratory: Negative.     Cardiovascular:  Positive for palpitations.   Gastrointestinal: Negative.    Endocrine: Positive for polyphagia and polyuria.   Genitourinary: Negative.    Musculoskeletal: Negative.    Allergic/Immunologic: Negative for immunocompromised state.   Neurological: Negative.    Hematological: Negative.    Psychiatric/Behavioral:  Positive for decreased concentration and dysphoric mood. The patient is nervous/anxious.        Objective   /84 (BP Location: Left arm, Patient Position: Sitting, Cuff Size: Large)   Pulse 91   Temp 97.5 °F (36.4 °C) (Temporal)   Resp 16   Ht 6' 4\" (1.93 m)   Wt 116 kg (255 lb)   SpO2 99%   BMI 31.04 kg/m²      Physical Exam  Vitals and nursing note reviewed.   Constitutional:       General: He is not in acute distress.     Appearance: He is well-developed.   HENT:      Head: Normocephalic and atraumatic.     Eyes:      Conjunctiva/sclera: Conjunctivae normal.       Cardiovascular:      Rate and Rhythm: Normal rate and regular rhythm.      Heart sounds: No murmur heard.  Pulmonary:      Effort: Pulmonary effort is normal. No respiratory distress.      Breath sounds: Normal breath sounds.   Abdominal:      Palpations: Abdomen is soft.      Tenderness: There is no abdominal tenderness.     Musculoskeletal:         General: No swelling.      Cervical back: Normal range of motion and neck supple.      Right lower leg: No edema.      Left lower leg: No edema.     Skin:     General: Skin is warm and dry.      Capillary Refill: Capillary refill takes less than 2 seconds.      Findings: No rash.     Neurological:      General: No focal deficit present.      Mental Status: He is alert and oriented to person, place, and time.     Psychiatric:         Mood and Affect: Mood normal.      Comments: Very anxios         "

## 2025-07-22 NOTE — ASSESSMENT & PLAN NOTE
A1c today was greater than 10 and he does not see endocrinology for another month so I took the opportunity to increase his Toujeo to 30 units daily he sees Endo  Orders:    insulin glargine (Toujeo SoloStar) 300 units/mL CONCENTRATED U-300 injection pen (1-unit dial); Inject 30units subcut once daily

## 2025-07-22 NOTE — ASSESSMENT & PLAN NOTE
Patient has had some anxiety issues in the past but has been off of medications for quite some time but for some reason he is having marked increase in his anxiety and depression.    Orders:    mirtazapine (REMERON) 15 mg tablet; Take 1 tablet (15 mg total) by mouth daily at bedtime    ALPRAZolam (XANAX) 0.25 mg tablet; Take 1 tablet (0.25 mg total) by mouth 2 (two) times a day as needed for anxiety

## 2025-07-22 NOTE — ASSESSMENT & PLAN NOTE
Lab Results   Component Value Date    HGBA1C 10.8 (A) 07/22/2025       Orders:    insulin glargine (Toujeo SoloStar) 300 units/mL CONCENTRATED U-300 injection pen (1-unit dial); Inject 30units subcut once daily

## 2025-08-01 ENCOUNTER — APPOINTMENT (OUTPATIENT)
Dept: LAB | Age: 45
End: 2025-08-01
Attending: PATHOLOGY
Payer: COMMERCIAL

## 2025-08-01 DIAGNOSIS — E55.9 VITAMIN D DEFICIENCY: ICD-10-CM

## 2025-08-01 DIAGNOSIS — E21.3 HYPERPARATHYROIDISM (HCC): ICD-10-CM

## 2025-08-01 DIAGNOSIS — Z00.6 ENCOUNTER FOR EXAMINATION FOR NORMAL COMPARISON OR CONTROL IN CLINICAL RESEARCH PROGRAM: ICD-10-CM

## 2025-08-01 DIAGNOSIS — Z79.4 TYPE 2 DIABETES MELLITUS WITH OTHER SPECIFIED COMPLICATION, WITH LONG-TERM CURRENT USE OF INSULIN (HCC): ICD-10-CM

## 2025-08-01 DIAGNOSIS — E11.69 TYPE 2 DIABETES MELLITUS WITH OTHER SPECIFIED COMPLICATION, WITH LONG-TERM CURRENT USE OF INSULIN (HCC): ICD-10-CM

## 2025-08-01 LAB
25(OH)D3 SERPL-MCNC: 29.6 NG/ML (ref 30–100)
ALBUMIN SERPL BCG-MCNC: 4.2 G/DL (ref 3.5–5)
ALP SERPL-CCNC: 70 U/L (ref 34–104)
ALT SERPL W P-5'-P-CCNC: 79 U/L (ref 7–52)
ANION GAP SERPL CALCULATED.3IONS-SCNC: 8 MMOL/L (ref 4–13)
AST SERPL W P-5'-P-CCNC: 34 U/L (ref 13–39)
BILIRUB SERPL-MCNC: 0.48 MG/DL (ref 0.2–1)
BUN SERPL-MCNC: 22 MG/DL (ref 5–25)
CALCIUM SERPL-MCNC: 10.9 MG/DL (ref 8.4–10.2)
CHLORIDE SERPL-SCNC: 101 MMOL/L (ref 96–108)
CO2 SERPL-SCNC: 25 MMOL/L (ref 21–32)
CREAT SERPL-MCNC: 0.77 MG/DL (ref 0.6–1.3)
EST. AVERAGE GLUCOSE BLD GHB EST-MCNC: 263 MG/DL
GFR SERPL CREATININE-BSD FRML MDRD: 111 ML/MIN/1.73SQ M
GLUCOSE P FAST SERPL-MCNC: 274 MG/DL (ref 65–99)
HBA1C MFR BLD: 10.8 %
PHOSPHATE SERPL-MCNC: 2.5 MG/DL (ref 2.7–4.5)
POTASSIUM SERPL-SCNC: 4.3 MMOL/L (ref 3.5–5.3)
PROT SERPL-MCNC: 6.9 G/DL (ref 6.4–8.4)
SODIUM SERPL-SCNC: 134 MMOL/L (ref 135–147)

## 2025-08-01 PROCEDURE — 80053 COMPREHEN METABOLIC PANEL: CPT

## 2025-08-01 PROCEDURE — 84100 ASSAY OF PHOSPHORUS: CPT

## 2025-08-01 PROCEDURE — 82306 VITAMIN D 25 HYDROXY: CPT

## 2025-08-01 PROCEDURE — 36415 COLL VENOUS BLD VENIPUNCTURE: CPT

## 2025-08-01 PROCEDURE — 83036 HEMOGLOBIN GLYCOSYLATED A1C: CPT

## 2025-08-11 LAB
APOB+LDLR+PCSK9 GENE MUT ANL BLD/T: NOT DETECTED
BRCA1+BRCA2 DEL+DUP + FULL MUT ANL BLD/T: NOT DETECTED
MLH1+MSH2+MSH6+PMS2 GN DEL+DUP+FUL M: NOT DETECTED

## 2025-08-15 ENCOUNTER — TELEPHONE (OUTPATIENT)
Dept: GASTROENTEROLOGY | Facility: CLINIC | Age: 45
End: 2025-08-15

## 2025-08-18 ENCOUNTER — OFFICE VISIT (OUTPATIENT)
Dept: GASTROENTEROLOGY | Facility: CLINIC | Age: 45
End: 2025-08-18
Payer: COMMERCIAL

## 2025-08-18 VITALS
BODY MASS INDEX: 31.05 KG/M2 | WEIGHT: 255 LBS | SYSTOLIC BLOOD PRESSURE: 124 MMHG | HEIGHT: 76 IN | TEMPERATURE: 99 F | DIASTOLIC BLOOD PRESSURE: 82 MMHG

## 2025-08-18 DIAGNOSIS — K22.70 BARRETT'S ESOPHAGUS WITHOUT DYSPLASIA: Primary | ICD-10-CM

## 2025-08-18 DIAGNOSIS — R79.89 ELEVATED LFTS: ICD-10-CM

## 2025-08-18 PROCEDURE — 99214 OFFICE O/P EST MOD 30 MIN: CPT

## 2025-08-18 RX ORDER — OMEPRAZOLE 40 MG/1
40 CAPSULE, DELAYED RELEASE ORAL
Qty: 90 CAPSULE | Refills: 3 | Status: SHIPPED | OUTPATIENT
Start: 2025-08-18

## 2025-08-21 ENCOUNTER — OFFICE VISIT (OUTPATIENT)
Dept: FAMILY MEDICINE CLINIC | Facility: CLINIC | Age: 45
End: 2025-08-21
Payer: COMMERCIAL

## 2025-08-21 VITALS
DIASTOLIC BLOOD PRESSURE: 86 MMHG | BODY MASS INDEX: 31.29 KG/M2 | OXYGEN SATURATION: 97 % | HEIGHT: 76 IN | HEART RATE: 87 BPM | WEIGHT: 257 LBS | RESPIRATION RATE: 16 BRPM | SYSTOLIC BLOOD PRESSURE: 130 MMHG | TEMPERATURE: 97.7 F

## 2025-08-21 DIAGNOSIS — E78.2 MIXED HYPERLIPIDEMIA: ICD-10-CM

## 2025-08-21 DIAGNOSIS — F41.8 MIXED ANXIETY DEPRESSIVE DISORDER: ICD-10-CM

## 2025-08-21 DIAGNOSIS — E11.69 TYPE 2 DIABETES MELLITUS WITH OTHER SPECIFIED COMPLICATION, WITH LONG-TERM CURRENT USE OF INSULIN (HCC): ICD-10-CM

## 2025-08-21 DIAGNOSIS — I10 ESSENTIAL HYPERTENSION: Primary | ICD-10-CM

## 2025-08-21 DIAGNOSIS — Z79.4 TYPE 2 DIABETES MELLITUS WITH OTHER SPECIFIED COMPLICATION, WITH LONG-TERM CURRENT USE OF INSULIN (HCC): ICD-10-CM

## 2025-08-21 LAB
LEFT EYE DIABETIC RETINOPATHY: ABNORMAL
LEFT EYE IMAGE QUALITY: ABNORMAL
LEFT EYE MACULAR EDEMA: ABNORMAL
LEFT EYE OTHER RETINOPATHY: ABNORMAL
RIGHT EYE DIABETIC RETINOPATHY: ABNORMAL
RIGHT EYE IMAGE QUALITY: ABNORMAL
RIGHT EYE MACULAR EDEMA: POSITIVE
RIGHT EYE OTHER RETINOPATHY: ABNORMAL
SEVERITY (EYE EXAM): ABNORMAL

## 2025-08-21 PROCEDURE — 92250 FUNDUS PHOTOGRAPHY W/I&R: CPT | Performed by: INTERNAL MEDICINE

## 2025-08-21 PROCEDURE — 99214 OFFICE O/P EST MOD 30 MIN: CPT | Performed by: INTERNAL MEDICINE

## 2025-08-21 PROCEDURE — 93000 ELECTROCARDIOGRAM COMPLETE: CPT | Performed by: INTERNAL MEDICINE
